# Patient Record
Sex: MALE | Race: OTHER | NOT HISPANIC OR LATINO
[De-identification: names, ages, dates, MRNs, and addresses within clinical notes are randomized per-mention and may not be internally consistent; named-entity substitution may affect disease eponyms.]

---

## 2018-11-15 PROBLEM — Z00.00 ENCOUNTER FOR PREVENTIVE HEALTH EXAMINATION: Status: ACTIVE | Noted: 2018-11-15

## 2018-11-23 ENCOUNTER — OTHER (OUTPATIENT)
Age: 78
End: 2018-11-23

## 2018-11-23 ENCOUNTER — TRANSCRIPTION ENCOUNTER (OUTPATIENT)
Age: 78
End: 2018-11-23

## 2018-11-23 ENCOUNTER — APPOINTMENT (OUTPATIENT)
Dept: RADIATION ONCOLOGY | Facility: CLINIC | Age: 78
End: 2018-11-23
Payer: MEDICARE

## 2018-11-23 VITALS
HEIGHT: 70 IN | OXYGEN SATURATION: 96 % | RESPIRATION RATE: 16 BRPM | BODY MASS INDEX: 26.2 KG/M2 | DIASTOLIC BLOOD PRESSURE: 92 MMHG | WEIGHT: 183 LBS | HEART RATE: 82 BPM | SYSTOLIC BLOOD PRESSURE: 151 MMHG

## 2018-11-23 DIAGNOSIS — Z78.9 OTHER SPECIFIED HEALTH STATUS: ICD-10-CM

## 2018-11-23 DIAGNOSIS — Z92.21 PERSONAL HISTORY OF ANTINEOPLASTIC CHEMOTHERAPY: ICD-10-CM

## 2018-11-23 DIAGNOSIS — Z86.69 PERSONAL HISTORY OF OTHER DISEASES OF THE NERVOUS SYSTEM AND SENSE ORGANS: ICD-10-CM

## 2018-11-23 DIAGNOSIS — F40.240 CLAUSTROPHOBIA: ICD-10-CM

## 2018-11-23 DIAGNOSIS — Z87.891 PERSONAL HISTORY OF NICOTINE DEPENDENCE: ICD-10-CM

## 2018-11-23 DIAGNOSIS — Z80.6 FAMILY HISTORY OF LEUKEMIA: ICD-10-CM

## 2018-11-23 PROCEDURE — 99205 OFFICE O/P NEW HI 60 MIN: CPT | Mod: 25

## 2018-11-23 PROCEDURE — 31575 DIAGNOSTIC LARYNGOSCOPY: CPT

## 2018-11-23 NOTE — DATA REVIEWED
[FreeTextEntry1] : I have personally reviewed all relevant imaging studies (PET, CT done at [facility]) and I have discussed the case with the referring physician Dr. Clifton.\par

## 2018-11-23 NOTE — HISTORY OF PRESENT ILLNESS
[FreeTextEntry1] : Mr. 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 Notably, he has PMH non-Hodgkin's lymphoma (patient unsure of which subtype), diagnosed in 1999, and treated by Dr. Shepherd with CHOP and Fludarabine.  \par \par He was followed by Dr. Clifton (Head & Neck) and had complaints of swelling and pain over the left submandibular gland in 2017. A CT scan of the neck done 12/12/17 showed no evidence of pharyngeal mass, asymmetry or laryngeal mass. There were numerous cervical lymph nodes which were larger on the left, as well as enlarged subclavian and axillary lymph nodes. \par \par He continued to have complaints of left neck pain and had occasional metallic taste in mouth in July 2018. He also developed a lisp/ difficulty speaking in July 2018.\par \par He underwent CT neck on 10/15/18, which showed the following:\par IMPRESSION:  \par 1. Soft tissue mass involving the left tongue base compatible with a tongue base neoplasm\par 2. Superficial nodule involving the right cheek which may represent an enlarged periparotid lymph node measuring 2.0 x 1.1 cm in axial cross-section, minimally increased in size since the prior study\par 3. Scattered mildly enlarged lymph nodes in the neck again noted, relatively stable since the prior study\par 4. Abnormal soft tissue within the orbits, not significantly changed\par 5. Stable nodular densities within the right upper lung\par 6. Cervical spondylosis with canal and foraminal stenosis at C4-C5. \par \par He underwent a biopsy of the mass on 10/26/18 by Dr. Clifton which showed invasive SCC  nonkeratinizing, poorly differentiated, p16 positive, HPV negative.  Flow cytometry also done, but results were inconclusive.\par \par PET/CT  ([facility])11/12/18\par IMPRESSION: \par 1.  Robust lobular hypermetabolic soft tissue mass involving the tongue base consistent with known primary tongue base malignancy.  It extends to the level of the hyoid without obvious involvement of the preepiglottic fat.  If warranted, consider dedicated MR imaging for further assessment.\par 2.  Assessment for metastatic regional lymph nodes from primary tongue malignancy is limited in this patient with evidence of low-grade lymphomatous involvement in the neck.  Asymmetrically hypermetabolic but stable appearing lymph nodes in the left level III lymph node would be the most concerning for potential regional ozzy disease from tongue base malignancy.\par 3.  Multiple homogeneous lymph nodes associated with low-grade uptake especially in the neck and chest and to a lesser extent the abdomen and pelvis is likely related to patient's history of non-Hodgkin's lymphoma.  Associated metabolic activity is consistent with a low-grade viability.\par 4.  No definite evidence to suggest focal splenic or marrow involvement of disease.  Low-grade diffuse involvement is not excluded.\par 5.  Stable appearance of peribronchial nodular foci in the right upper lobe, too small to characterize, but favoring a post inflammatory/infectious process.  Other benign type changes are seen in the mid to lower lung fields, which can be reassessed at follow-up imaging.\par 6.  No suspicious findings to suggest marginal or regional ozzy recurrence of prostate malignancy or evidence of osteoblastic metastatic disease.\par \par Today, he reports he feels generally well with exception of difficulty swallowing solid foods since the biopsy. He has been eating soups and soft foods. He also reports xerostomia and continues to have a lisp. He is mildly fatigue, but it improves with rest. He continues to work as a . He denies fever, chills, CP, SOB, N/V/D, decreased appetite. He has lost approx 6- 7 pounds since the biopsy on 10/26. Of note, he reports he is claustrophobic, has needed anxiolytics prior to imaging in the past.  \par \par His dentist is Dr. Hager ([phone]) and he last saw Dr. Hager in September. \par Family history notable for brother with leukemia. \par

## 2018-11-23 NOTE — PROCEDURE
[Lesion] : lesion identified by mirror examination needing further evaluation [Dysphagia] : dysphagia not clearly evaluated by indirect laryngoscopy [Topical Lidocaine] : topical lidocaine [Flexible Endoscope] : examined with the flexible endoscope [Photographs Taken] : photographs taken [Normal] : the true vocal cords ~T were normal [de-identified] : left BOT mass without extension to lingual surface of epiglottis. vallecula not well seen 2/2 saliva

## 2018-11-23 NOTE — PHYSICAL EXAM
[Normal] : normoactive bowel sounds, soft and nontender, no hepatosplenomegaly or masses appreciated [Sensation] : the sensory exam was normal to light touch and pinprick [Motor Exam] : the motor exam was normal [de-identified] : hot potato voice and mild slurred speech. tongue midline but poor movement to the left. mass felt in lateral left BOT. no other masses seen/palpated. good dentition  [de-identified] : enlarged and firm left submandibular gland [de-identified] : leftward tongue movement deficient; otherwise CN II-XII grossly intact.

## 2018-11-23 NOTE — REVIEW OF SYSTEMS
[Patient Intake Form Reviewed] : Patient intake form was reviewed [Recent Change In Weight] : ~T recent weight change [Dysphagia] : dysphagia [Negative] : Allergic/Immunologic [Fever] : no fever [Night Sweats] : no night sweats [Fatigue] : no fatigue [FreeTextEntry2] : weight loss of approx 6- 7 pounds in past 3 weeks [FreeTextEntry4] : see HPI.  difficulty swallowing solids. difficulty clearing saliva. Speech changes (lisp)

## 2018-11-23 NOTE — VITALS
[80: Normal activity with effort; some signs or symptoms of disease.] : 80: Normal activity with effort; some signs or symptoms of disease.  [Date: ____________] : Patient's last distress assessment performed on [unfilled]. [3 - Distress Level] : Distress Level: 3 [Maximal Pain Intensity: 0/10] : 0/10 [Least Pain Intensity: 0/10] : 0/10 [ECOG Performance Status: 1 - Restricted in physically strenuous activity but ambulatory and able to carry out work of a light or sedentary nature] : Performance Status: 1 - Restricted in physically strenuous activity but ambulatory and able to carry out work of a light or sedentary nature, e.g., light house work, office work [FreeTextEntry7] : Physical stressor- difficulty eating/ swallowing.

## 2018-11-26 ENCOUNTER — CLINICAL ADVICE (OUTPATIENT)
Age: 78
End: 2018-11-26

## 2018-11-28 ENCOUNTER — FORM ENCOUNTER (OUTPATIENT)
Age: 78
End: 2018-11-28

## 2018-11-28 ENCOUNTER — OUTPATIENT (OUTPATIENT)
Dept: OUTPATIENT SERVICES | Facility: HOSPITAL | Age: 78
LOS: 1 days | End: 2018-11-28
Payer: MEDICARE

## 2018-11-28 ENCOUNTER — RESULT REVIEW (OUTPATIENT)
Age: 78
End: 2018-11-28

## 2018-11-28 DIAGNOSIS — C01 MALIGNANT NEOPLASM OF BASE OF TONGUE: ICD-10-CM

## 2018-11-28 LAB — SURGICAL PATHOLOGY STUDY: SIGNIFICANT CHANGE UP

## 2018-11-28 PROCEDURE — 88321 CONSLTJ&REPRT SLD PREP ELSWR: CPT

## 2018-11-29 ENCOUNTER — APPOINTMENT (OUTPATIENT)
Dept: MRI IMAGING | Facility: HOSPITAL | Age: 78
End: 2018-11-29
Payer: MEDICARE

## 2018-11-29 ENCOUNTER — OUTPATIENT (OUTPATIENT)
Dept: OUTPATIENT SERVICES | Facility: HOSPITAL | Age: 78
LOS: 1 days | End: 2018-11-29
Payer: MEDICARE

## 2018-11-29 PROCEDURE — 70543 MRI ORBT/FAC/NCK W/O &W/DYE: CPT

## 2018-11-29 PROCEDURE — A9585: CPT

## 2018-11-29 PROCEDURE — 70543 MRI ORBT/FAC/NCK W/O &W/DYE: CPT | Mod: 26

## 2018-12-04 ENCOUNTER — APPOINTMENT (OUTPATIENT)
Dept: OTOLARYNGOLOGY | Facility: CLINIC | Age: 78
End: 2018-12-04
Payer: MEDICARE

## 2018-12-04 PROCEDURE — 92610 EVALUATE SWALLOWING FUNCTION: CPT | Mod: GN

## 2018-12-04 PROCEDURE — 92526 ORAL FUNCTION THERAPY: CPT | Mod: GN

## 2018-12-04 PROCEDURE — G8996: CPT | Mod: NC,CJ,GN

## 2018-12-04 PROCEDURE — G8997: CPT | Mod: NC,CI,GN

## 2018-12-18 VITALS
SYSTOLIC BLOOD PRESSURE: 140 MMHG | OXYGEN SATURATION: 99 % | BODY MASS INDEX: 25.02 KG/M2 | HEART RATE: 98 BPM | RESPIRATION RATE: 16 BRPM | DIASTOLIC BLOOD PRESSURE: 93 MMHG | WEIGHT: 174.4 LBS

## 2018-12-18 NOTE — PROCEDURE
[Lesion] : lesion identified by mirror examination needing further evaluation [Dysphagia] : dysphagia not clearly evaluated by indirect laryngoscopy [Topical Lidocaine] : topical lidocaine [Flexible Endoscope] : examined with the flexible endoscope [Photographs Taken] : photographs taken [Normal] : the true vocal cords ~T were normal [de-identified] : left BOT mass without extension to lingual surface of epiglottis. vallecula not well seen 2/2 saliva

## 2018-12-18 NOTE — REASON FOR VISIT
[Head and Neck Cancer] : head and neck cancer [Spouse] : spouse [Routine On-Treatment] : a routine on-treatment visit for

## 2018-12-18 NOTE — VITALS
[Maximal Pain Intensity: 0/10] : 0/10 [Least Pain Intensity: 0/10] : 0/10 [80: Normal activity with effort; some signs or symptoms of disease.] : 80: Normal activity with effort; some signs or symptoms of disease.  [ECOG Performance Status: 1 - Restricted in physically strenuous activity but ambulatory and able to carry out work of a light or sedentary nature] : Performance Status: 1 - Restricted in physically strenuous activity but ambulatory and able to carry out work of a light or sedentary nature, e.g., light house work, office work [Date: ____________] : Patient's last distress assessment performed on [unfilled]. [3 - Distress Level] : Distress Level: 3 [FreeTextEntry7] : Physical stressor- difficulty eating/ swallowing.

## 2018-12-18 NOTE — REVIEW OF SYSTEMS
[Patient Intake Form Reviewed] : Patient intake form was reviewed [FreeTextEntry2] : weight loss of approx 6- 7 pounds in past 3 weeks [FreeTextEntry4] : see HPI.  difficulty swallowing solids. difficulty clearing saliva. Speech changes (lisp) [Diarrhea: Grade 1 - Increase of <4 stools per day over baseline; mild increase in ostomy output compared to baseline] : Diarrhea: Grade 1 - Increase of <4 stools per day over baseline; mild increase in ostomy output compared to baseline [Dysphagia: Grade 1 - Symptomatic, able to eat regular diet] : Dysphagia: Grade 1 - Symptomatic, able to eat regular diet [Nausea: Grade 0] : Nausea: Grade 0 [Xerostomia: Grade 0] : Xerostomia: Grade 0 [Oral Pain: Grade 0] : Oral Pain: Grade 0 [Dysgeusia: Grade 0] : Dysgeusia: Grade 0 [Fatigue] : fatigue [Recent Change In Weight] : ~T recent weight change [Dysphagia] : dysphagia [Negative] : Integumentary [Fever] : no fever [Chills] : no chills [Night Sweats] : no night sweats

## 2018-12-18 NOTE — HISTORY OF PRESENT ILLNESS
[FreeTextEntry1] : 12/18/18-OTV-Mr. Nathaniel Fuentes has completed 0/7000cGy to oropharynx/neck\par Today he states that he feels well. He begins chemo under the care of Dr. Blandon on Wednesday. He had food poisoning and diarrhea all day yesterday and is just now able to eat again. \par His wife is interested in participating in [employer] chats. \par \par Oncologic History\par Mr. 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 Notably, he has PMH non-Hodgkin's lymphoma (patient unsure of which subtype), diagnosed in 1999, and treated by Dr. Shepherd with CHOP and Fludarabine.  \par \par He was followed by Dr. Clifton (Head & Neck) and had complaints of swelling and pain over the left submandibular gland in 2017. A CT scan of the neck done 12/12/17 showed no evidence of pharyngeal mass, asymmetry or laryngeal mass. There were numerous cervical lymph nodes which were larger on the left, as well as enlarged subclavian and axillary lymph nodes. \par \par He continued to have complaints of left neck pain and had occasional metallic taste in mouth in July 2018. He also developed a lisp/ difficulty speaking in July 2018.\par \par He underwent CT neck on 10/15/18, which showed the following:\par IMPRESSION:  \par 1. Soft tissue mass involving the left tongue base compatible with a tongue base neoplasm\par 2. Superficial nodule involving the right cheek which may represent an enlarged periparotid lymph node measuring 2.0 x 1.1 cm in axial cross-section, minimally increased in size since the prior study\par 3. Scattered mildly enlarged lymph nodes in the neck again noted, relatively stable since the prior study\par 4. Abnormal soft tissue within the orbits, not significantly changed\par 5. Stable nodular densities within the right upper lung\par 6. Cervical spondylosis with canal and foraminal stenosis at C4-C5. \par \par He underwent a biopsy of the mass on 10/26/18 by Dr. Clifton which showed invasive SCC  nonkeratinizing, poorly differentiated, p16 positive, HPV negative.  Flow cytometry also done, but results were inconclusive.\par \par PET/CT  ([facility])11/12/18\par IMPRESSION: \par 1.  Robust lobular hypermetabolic soft tissue mass involving the tongue base consistent with known primary tongue base malignancy.  It extends to the level of the hyoid without obvious involvement of the preepiglottic fat.  If warranted, consider dedicated MR imaging for further assessment.\par 2.  Assessment for metastatic regional lymph nodes from primary tongue malignancy is limited in this patient with evidence of low-grade lymphomatous involvement in the neck.  Asymmetrically hypermetabolic but stable appearing lymph nodes in the left level III lymph node would be the most concerning for potential regional ozzy disease from tongue base malignancy.\par 3.  Multiple homogeneous lymph nodes associated with low-grade uptake especially in the neck and chest and to a lesser extent the abdomen and pelvis is likely related to patient's history of non-Hodgkin's lymphoma.  Associated metabolic activity is consistent with a low-grade viability.\par 4.  No definite evidence to suggest focal splenic or marrow involvement of disease.  Low-grade diffuse involvement is not excluded.\par 5.  Stable appearance of peribronchial nodular foci in the right upper lobe, too small to characterize, but favoring a post inflammatory/infectious process.  Other benign type changes are seen in the mid to lower lung fields, which can be reassessed at follow-up imaging.\par 6.  No suspicious findings to suggest marginal or regional ozzy recurrence of prostate malignancy or evidence of osteoblastic metastatic disease.\par \par Today, he reports he feels generally well with exception of difficulty swallowing solid foods since the biopsy. He has been eating soups and soft foods. He also reports xerostomia and continues to have a lisp. He is mildly fatigue, but it improves with rest. He continues to work as a . He denies fever, chills, CP, SOB, N/V/D, decreased appetite. He has lost approx 6- 7 pounds since the biopsy on 10/26. Of note, he reports he is claustrophobic, has needed anxiolytics prior to imaging in the past.  \par \par His dentist is Dr. Hgaer ([phone]) and he last saw Dr. Hager in September. \par Family history notable for brother with leukemia. \par

## 2018-12-18 NOTE — DISEASE MANAGEMENT
[Clinical] : TNM Stage: c [III] : III [TTNM] : 4 [NTNM] : 0 [MTNM] : 0 [de-identified] : 0 [de-identified] : 0003 [de-identified] : oropharynx/neck

## 2018-12-18 NOTE — PHYSICAL EXAM
[Sensation] : the sensory exam was normal to light touch and pinprick [Motor Exam] : the motor exam was normal [de-identified] : enlarged and firm left submandibular gland [de-identified] : leftward tongue movement deficient; otherwise CN II-XII grossly intact.   [Normal Oral Cavity] : oral cavity was normal [] : no respiratory distress [Skin Color & Pigmentation] : normal skin color and pigmentation [Oriented To Time, Place, And Person] : oriented to person, place, and time [Normal] : supple with no thyromegaly or masses appreciated [de-identified] : tongue is limited on full protrusion 2/2 tethering at the left BOT/FOM, no masses seen or felt. enlarged and firm left submandibular gland.

## 2018-12-24 VITALS
WEIGHT: 174 LBS | DIASTOLIC BLOOD PRESSURE: 79 MMHG | HEART RATE: 99 BPM | BODY MASS INDEX: 24.97 KG/M2 | RESPIRATION RATE: 14 BRPM | OXYGEN SATURATION: 98 % | TEMPERATURE: 98.1 F | SYSTOLIC BLOOD PRESSURE: 128 MMHG

## 2018-12-24 VITALS — DIASTOLIC BLOOD PRESSURE: 80 MMHG | SYSTOLIC BLOOD PRESSURE: 122 MMHG | HEART RATE: 103 BPM

## 2018-12-24 NOTE — PHYSICAL EXAM
[Normal Oral Cavity] : oral cavity was normal [Normal] : supple with no thyromegaly or masses appreciated [] : no respiratory distress [Skin Color & Pigmentation] : normal skin color and pigmentation [Oriented To Time, Place, And Person] : oriented to person, place, and time [de-identified] : tongue is improved on protrusion, mild tethering at the left BOT/FOM, no masses seen or felt. enlarged and firm left submandibular gland.

## 2018-12-24 NOTE — REVIEW OF SYSTEMS
[Diarrhea: Grade 1 - Increase of <4 stools per day over baseline; mild increase in ostomy output compared to baseline] : Diarrhea: Grade 1 - Increase of <4 stools per day over baseline; mild increase in ostomy output compared to baseline [Dysphagia: Grade 1 - Symptomatic, able to eat regular diet] : Dysphagia: Grade 1 - Symptomatic, able to eat regular diet [Nausea: Grade 0] : Nausea: Grade 0 [Xerostomia: Grade 0] : Xerostomia: Grade 0 [Oral Pain: Grade 0] : Oral Pain: Grade 0 [Dysgeusia: Grade 0] : Dysgeusia: Grade 0 [Fatigue] : fatigue [Recent Change In Weight] : ~T recent weight change [Dysphagia] : dysphagia [Negative] : Integumentary [Edema Limbs: Grade 0] : Edema Limbs: Grade 0  [Fatigue: Grade 1 - Fatigue relieved by rest] : Fatigue: Grade 1 - Fatigue relieved by rest [Fever] : no fever [Chills] : no chills [Night Sweats] : no night sweats

## 2018-12-24 NOTE — DISEASE MANAGEMENT
[Clinical] : TNM Stage: c [III] : III [TTNM] : 4 [NTNM] : 0 [MTNM] : 0 [de-identified] : 568 [de-identified] : [id number] [de-identified] : oropharynx/neck

## 2018-12-24 NOTE — HISTORY OF PRESENT ILLNESS
[FreeTextEntry1] : 12/24/18-12/18/18-OTV-Mr. Nathaniel Fuentes has completed 800/7000cGy to oropharynx/neck\par Today he states he feels fatigued. He tolerated chemo well last Thurs, no n/v, no loss of appetite, and no constipation. He has not started aquaphor or gabapentin, he has brought all his meds in to the office so he can review with us. \par \par 12/18/18-OTV-Mr. Nathaniel Fuentes has completed 0/7000cGy to oropharynx/neck\par Today he states that he feels well. He begins chemo under the care of Dr. Blandon on Wednesday. He had food poisoning and diarrhea all day yesterday and is just now able to eat again. \par His wife is interested in participating in [employer]. \par \par Oncologic History\par Mr. 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 Notably, he has PMH non-Hodgkin's lymphoma (patient unsure of which subtype), diagnosed in 1999, and treated by Dr. Shepherd with CHOP and Fludarabine.  \par \par He was followed by Dr. Clifton (Head & Neck) and had complaints of swelling and pain over the left submandibular gland in 2017. A CT scan of the neck done 12/12/17 showed no evidence of pharyngeal mass, asymmetry or laryngeal mass. There were numerous cervical lymph nodes which were larger on the left, as well as enlarged subclavian and axillary lymph nodes. \par \par He continued to have complaints of left neck pain and had occasional metallic taste in mouth in July 2018. He also developed a lisp/ difficulty speaking in July 2018.\par \par He underwent CT neck on 10/15/18, which showed the following:\par IMPRESSION:  \par 1. Soft tissue mass involving the left tongue base compatible with a tongue base neoplasm\par 2. Superficial nodule involving the right cheek which may represent an enlarged periparotid lymph node measuring 2.0 x 1.1 cm in axial cross-section, minimally increased in size since the prior study\par 3. Scattered mildly enlarged lymph nodes in the neck again noted, relatively stable since the prior study\par 4. Abnormal soft tissue within the orbits, not significantly changed\par 5. Stable nodular densities within the right upper lung\par 6. Cervical spondylosis with canal and foraminal stenosis at C4-C5. \par \par He underwent a biopsy of the mass on 10/26/18 by Dr. Clifton which showed invasive SCC  nonkeratinizing, poorly differentiated, p16 positive, HPV negative.  Flow cytometry also done, but results were inconclusive.\par \par PET/CT  ([facility])11/12/18\par IMPRESSION: \par 1.  Robust lobular hypermetabolic soft tissue mass involving the tongue base consistent with known primary tongue base malignancy.  It extends to the level of the hyoid without obvious involvement of the preepiglottic fat.  If warranted, consider dedicated MR imaging for further assessment.\par 2.  Assessment for metastatic regional lymph nodes from primary tongue malignancy is limited in this patient with evidence of low-grade lymphomatous involvement in the neck.  Asymmetrically hypermetabolic but stable appearing lymph nodes in the left level III lymph node would be the most concerning for potential regional ozzy disease from tongue base malignancy.\par 3.  Multiple homogeneous lymph nodes associated with low-grade uptake especially in the neck and chest and to a lesser extent the abdomen and pelvis is likely related to patient's history of non-Hodgkin's lymphoma.  Associated metabolic activity is consistent with a low-grade viability.\par 4.  No definite evidence to suggest focal splenic or marrow involvement of disease.  Low-grade diffuse involvement is not excluded.\par 5.  Stable appearance of peribronchial nodular foci in the right upper lobe, too small to characterize, but favoring a post inflammatory/infectious process.  Other benign type changes are seen in the mid to lower lung fields, which can be reassessed at follow-up imaging.\par 6.  No suspicious findings to suggest marginal or regional ozzy recurrence of prostate malignancy or evidence of osteoblastic metastatic disease.\par \par Today, he reports he feels generally well with exception of difficulty swallowing solid foods since the biopsy. He has been eating soups and soft foods. He also reports xerostomia and continues to have a lisp. He is mildly fatigue, but it improves with rest. He continues to work as a . He denies fever, chills, CP, SOB, N/V/D, decreased appetite. He has lost approx 6- 7 pounds since the biopsy on 10/26. Of note, he reports he is claustrophobic, has needed anxiolytics prior to imaging in the past.  \par \par His dentist is Dr. Hager ([phone]) and he last saw Dr. Hager in September. \par Family history notable for brother with leukemia. \par

## 2018-12-31 VITALS
HEART RATE: 89 BPM | SYSTOLIC BLOOD PRESSURE: 133 MMHG | BODY MASS INDEX: 24.97 KG/M2 | RESPIRATION RATE: 15 BRPM | TEMPERATURE: 98.7 F | OXYGEN SATURATION: 97 % | WEIGHT: 174 LBS | DIASTOLIC BLOOD PRESSURE: 80 MMHG

## 2018-12-31 NOTE — DISEASE MANAGEMENT
[Clinical] : TNM Stage: c [III] : III [TTNM] : 4 [NTNM] : 0 [MTNM] : 0 [de-identified] : 9588 [de-identified] : 7000 cGy [de-identified] : oropharynx/neck

## 2018-12-31 NOTE — PHYSICAL EXAM
[Normal Oral Cavity] : oral cavity was normal [Normal] : supple with no thyromegaly or masses appreciated [Skin Color & Pigmentation] : normal skin color and pigmentation [Oriented To Time, Place, And Person] : oriented to person, place, and time [de-identified] : tongue is improved on protrusion, less tethering at the left BOT/FOM, no masses seen or felt. softer left submandibular gland.

## 2018-12-31 NOTE — HISTORY OF PRESENT ILLNESS
[FreeTextEntry1] : 12/31/18- OTV- Mr. Fuentes has completed 1600/7000 cGy to the oropharynx/ neck.  Today, he feels well. He felt tired on Saturday, after chemo on Thurs. No n/v, taste is intact, saliva is great. Tongue is moving well. He can eat soft foods. Constipation has been having the last 5 days. He is using gabapentin. Not using baking soda rinse. Using aquaphor. \par \par 12/24/18- OTV- Mr. Nathaniel Fuentes has completed 800/ 7000cGy to oropharynx/neck\par Today he states he feels fatigued. He tolerated chemo well last Thurs, no n/v, no loss of appetite, and no constipation. He has not started aquaphor or gabapentin, he has brought all his meds in to the office so he can review with us. \par \par 12/18/18-OTV-Mr. Nathaniel Fuentes has completed 0/7000cGy to oropharynx/neck\par Today he states that he feels well. He begins chemo under the care of Dr. Blandon on Wednesday. He had food poisoning and diarrhea all day yesterday and is just now able to eat again. \par His wife is interested in participating in [employer] ch[employer]. \par \par Oncologic History\par Mr. 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 Notably, he has PMH non-Hodgkin's lymphoma (patient unsure of which subtype), diagnosed in 1999, and treated by Dr. Shepherd with CHOP and Fludarabine.  \par \par He was followed by Dr. Clifton ([facility] Ne[facility]) and had complaints of swelling and pain over the left submandibular gland in 2017. A CT scan of the neck done 12/12/17 showed no evidence of pharyngeal mass, asymmetry or laryngeal mass. There were numerous cervical lymph nodes which were larger on the left, as well as enlarged subclavian and axillary lymph nodes. \par \par He continued to have complaints of left neck pain and had occasional metallic taste in mouth in July 2018. He also developed a lisp/ difficulty speaking in July 2018.\par \par He underwent CT neck on 10/15/18, which showed the following:\par IMPRESSION:  \par 1. Soft tissue mass involving the left tongue base compatible with a tongue base neoplasm\par 2. Superficial nodule involving the right cheek which may represent an enlarged periparotid lymph node measuring 2.0 x 1.1 cm in axial cross-section, minimally increased in size since the prior study\par 3. Scattered mildly enlarged lymph nodes in the neck again noted, relatively stable since the prior study\par 4. Abnormal soft tissue within the orbits, not significantly changed\par 5. Stable nodular densities within the right upper lung\par 6. Cervical spondylosis with canal and foraminal stenosis at C4-C5. \par \par He underwent a biopsy of the mass on 10/26/18 by Dr. Clifton which showed invasive SCC  nonkeratinizing, poorly differentiated, p16 positive, HPV negative.  Flow cytometry also done, but results were inconclusive.\par \par PET/CT  ([facility])11/12/18\par IMPRESSION: \par 1.  Robust lobular hypermetabolic soft tissue mass involving the tongue base consistent with known primary tongue base malignancy.  It extends to the level of the hyoid without obvious involvement of the preepiglottic fat.  If warranted, consider dedicated MR imaging for further assessment.\par 2.  Assessment for metastatic regional lymph nodes from primary tongue malignancy is limited in this patient with evidence of low-grade lymphomatous involvement in the neck.  Asymmetrically hypermetabolic but stable appearing lymph nodes in the left level III lymph node would be the most concerning for potential regional ozzy disease from tongue base malignancy.\par 3.  Multiple homogeneous lymph nodes associated with low-grade uptake especially in the neck and chest and to a lesser extent the abdomen and pelvis is likely related to patient's history of non-Hodgkin's lymphoma.  Associated metabolic activity is consistent with a low-grade viability.\par 4.  No definite evidence to suggest focal splenic or marrow involvement of disease.  Low-grade diffuse involvement is not excluded.\par 5.  Stable appearance of peribronchial nodular foci in the right upper lobe, too small to characterize, but favoring a post inflammatory/infectious process.  Other benign type changes are seen in the mid to lower lung fields, which can be reassessed at follow-up imaging.\par 6.  No suspicious findings to suggest marginal or regional ozzy recurrence of prostate malignancy or evidence of osteoblastic metastatic disease.\par \par Today, he reports he feels generally well with exception of difficulty swallowing solid foods since the biopsy. He has been eating soups and soft foods. He also reports xerostomia and continues to have a lisp. He is mildly fatigue, but it improves with rest. He continues to work as a . He denies fever, chills, CP, SOB, N/V/D, decreased appetite. He has lost approx 6- 7 pounds since the biopsy on 10/26. Of note, he reports he is claustrophobic, has needed anxiolytics prior to imaging in the past.  \par \par His dentist is Dr. Hager ([phone]) and he last saw Dr. Hager in September. \par Family history notable for brother with leukemia. \par

## 2018-12-31 NOTE — REVIEW OF SYSTEMS
[Diarrhea: Grade 1 - Increase of <4 stools per day over baseline; mild increase in ostomy output compared to baseline] : Diarrhea: Grade 1 - Increase of <4 stools per day over baseline; mild increase in ostomy output compared to baseline [Dysphagia: Grade 1 - Symptomatic, able to eat regular diet] : Dysphagia: Grade 1 - Symptomatic, able to eat regular diet [Nausea: Grade 0] : Nausea: Grade 0 [Edema Limbs: Grade 0] : Edema Limbs: Grade 0  [Fatigue: Grade 1 - Fatigue relieved by rest] : Fatigue: Grade 1 - Fatigue relieved by rest [Xerostomia: Grade 0] : Xerostomia: Grade 0 [Oral Pain: Grade 0] : Oral Pain: Grade 0 [Dysgeusia: Grade 0] : Dysgeusia: Grade 0 [Fatigue] : fatigue [Recent Change In Weight] : ~T recent weight change [Dysphagia] : dysphagia [Negative] : Integumentary [Fever] : no fever [Chills] : no chills [Night Sweats] : no night sweats

## 2019-01-10 VITALS
DIASTOLIC BLOOD PRESSURE: 71 MMHG | SYSTOLIC BLOOD PRESSURE: 113 MMHG | BODY MASS INDEX: 23.85 KG/M2 | WEIGHT: 166.2 LBS | RESPIRATION RATE: 15 BRPM | HEART RATE: 102 BPM

## 2019-01-10 NOTE — REVIEW OF SYSTEMS
[Diarrhea: Grade 1 - Increase of <4 stools per day over baseline; mild increase in ostomy output compared to baseline] : Diarrhea: Grade 1 - Increase of <4 stools per day over baseline; mild increase in ostomy output compared to baseline [Dysphagia: Grade 1 - Symptomatic, able to eat regular diet] : Dysphagia: Grade 1 - Symptomatic, able to eat regular diet [Nausea: Grade 0] : Nausea: Grade 0 [Fatigue] : fatigue [Recent Change In Weight] : ~T recent weight change [Dysphagia] : dysphagia [Negative] : Integumentary [Edema Limbs: Grade 0] : Edema Limbs: Grade 0  [Fatigue: Grade 2 - Fatigue not relieved by rest; limiting instrumental ADL] : Fatigue: Grade 2 - Fatigue not relieved by rest; limiting instrumental ADL [Mucositis Oral: Grade 1 - Asymptomatic or mild symptoms; intervention not indicated] : Mucositis Oral: Grade 1 - Asymptomatic or mild symptoms; intervention not indicated [Xerostomia: Grade 1 - Symptomatic (e.g., dry or thick saliva) without significant dietary alteration; unstimulated saliva flow >0.2 ml/min] : Xerostomia: Grade 1 - Symptomatic (e.g., dry or thick saliva) without significant dietary alteration; unstimulated saliva flow >0.2 ml/min [Oral Pain: Grade 1 - Mild pain] : Oral Pain: Grade 1 - Mild pain [Dysgeusia: Grade 1- Altered taste but no change in diet] : Dysgeusia: Grade 1 - Altered taste but no change in diet [Fever] : no fever [Chills] : no chills [Night Sweats] : no night sweats

## 2019-01-10 NOTE — DISEASE MANAGEMENT
[Clinical] : TNM Stage: c [FreeTextEntry4] : HPV related SCC  [TTNM] : 4 [NTNM] : 0 [MTNM] : 0 [III] : III [de-identified] : 3000 cGy [de-identified] : 7000 cGy [de-identified] : Oropharynx/ Neck

## 2019-01-10 NOTE — PHYSICAL EXAM
[Normal Oral Cavity] : oral cavity was normal [Normal] : supple with no thyromegaly or masses appreciated [Skin Color & Pigmentation] : normal skin color and pigmentation [Oriented To Time, Place, And Person] : oriented to person, place, and time [de-identified] : tongue less tethering at the left BOT/FOM, no masses. grade 1 mucositis. . softer left submandibular gland. [de-identified] : mild erythema chin

## 2019-01-10 NOTE — HISTORY OF PRESENT ILLNESS
[FreeTextEntry1] : 1/10/19- OTV- Mr. Fuentes has completed 3000/7000 cGy to the oropharynx/ neck.  Today, he complains of significant fatigue over weekends, after chemo. Denies n/v, pain. Has much less appetite, trying to eat protein shakes, but food is becoming harder to eat. Using gabapentin and rinsing mouth. \par \par Of note, he reported extreme fatigue on 1/8/19. Chemo/ labs scheduled for today; he wants to take a break before last cycle of chemo. This was discussed with Dr. Ratliff who is covering for Dr. Blandon and will discuss further with Dr. Blandon.\par \par 12/31/18- OTV- Mr. Fuentes has completed 1600/7000 cGy to the oropharynx/ neck.  Today, he feels well. He felt tired on Saturday, after chemo on Thurs. No n/v, taste is intact, saliva is great. Tongue is moving well. He can eat soft foods. Constipation has been having the last 5 days. He is using gabapentin. Not using baking soda rinse. Using aquaphor. \par \par 12/24/18- OTV- Mr. Nathaniel Fuentes has completed 800/ 7000cGy to oropharynx/neck\par Today he states he feels fatigued. He tolerated chemo well last Thurs, no n/v, no loss of appetite, and no constipation. He has not started aquaphor or gabapentin, he has brought all his meds in to the office so he can review with us. \par \par 12/18/18-OTV-Mr. Nathaniel Fuentes has completed 0/7000cGy to oropharynx/neck\par Today he states that he feels well. He begins chemo under the care of Dr. Blandon on Wednesday. He had food poisoning and diarrhea all day yesterday and is just now able to eat again. \par His wife is interested in participating in [employer] chats. \par \par Oncologic History\par Mr. 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 Notably, he has PMH non-Hodgkin's lymphoma (patient unsure of which subtype), diagnosed in 1999, and treated by Dr. Shepherd with CHOP and Fludarabine.  \par \par He was followed by Dr. Clifton ([facility]) and had complaints of swelling and pain over the left submandibular gland in 2017. A CT scan of the neck done 12/12/17 showed no evidence of pharyngeal mass, asymmetry or laryngeal mass. There were numerous cervical lymph nodes which were larger on the left, as well as enlarged subclavian and axillary lymph nodes. \par \par He continued to have complaints of left neck pain and had occasional metallic taste in mouth in July 2018. He also developed a lisp/ difficulty speaking in July 2018.\par \par He underwent CT neck on 10/15/18, which showed the following:\par IMPRESSION:  \par 1. Soft tissue mass involving the left tongue base compatible with a tongue base neoplasm\par 2. Superficial nodule involving the right cheek which may represent an enlarged periparotid lymph node measuring 2.0 x 1.1 cm in axial cross-section, minimally increased in size since the prior study\par 3. Scattered mildly enlarged lymph nodes in the neck again noted, relatively stable since the prior study\par 4. Abnormal soft tissue within the orbits, not significantly changed\par 5. Stable nodular densities within the right upper lung\par 6. Cervical spondylosis with canal and foraminal stenosis at C4-C5. \par \par He underwent a biopsy of the mass on 10/26/18 by Dr. Clifton which showed invasive SCC  nonkeratinizing, poorly differentiated, p16 positive, HPV negative.  Flow cytometry also done, but results were inconclusive.\par \par PET/CT  ([facility])11/12/18\par IMPRESSION: \par 1.  Robust lobular hypermetabolic soft tissue mass involving the tongue base consistent with known primary tongue base malignancy.  It extends to the level of the hyoid without obvious involvement of the preepiglottic fat.  If warranted, consider dedicated MR imaging for further assessment.\par 2.  Assessment for metastatic regional lymph nodes from primary tongue malignancy is limited in this patient with evidence of low-grade lymphomatous involvement in the neck.  Asymmetrically hypermetabolic but stable appearing lymph nodes in the left level III lymph node would be the most concerning for potential regional ozzy disease from tongue base malignancy.\par 3.  Multiple homogeneous lymph nodes associated with low-grade uptake especially in the neck and chest and to a lesser extent the abdomen and pelvis is likely related to patient's history of non-Hodgkin's lymphoma.  Associated metabolic activity is consistent with a low-grade viability.\par 4.  No definite evidence to suggest focal splenic or marrow involvement of disease.  Low-grade diffuse involvement is not excluded.\par 5.  Stable appearance of peribronchial nodular foci in the right upper lobe, too small to characterize, but favoring a post inflammatory/infectious process.  Other benign type changes are seen in the mid to lower lung fields, which can be reassessed at follow-up imaging.\par 6.  No suspicious findings to suggest marginal or regional ozzy recurrence of prostate malignancy or evidence of osteoblastic metastatic disease.\par \par Today, he reports he feels generally well with exception of difficulty swallowing solid foods since the biopsy. He has been eating soups and soft foods. He also reports xerostomia and continues to have a lisp. He is mildly fatigue, but it improves with rest. He continues to work as a . He denies fever, chills, CP, SOB, N/V/D, decreased appetite. He has lost approx 6- 7 pounds since the biopsy on 10/26. Of note, he reports he is claustrophobic, has needed anxiolytics prior to imaging in the past.  \par \par His dentist is Dr. Hager ([phone]) and he last saw Dr. Hager in September. \par Family history notable for brother with leukemia. \par

## 2019-01-15 VITALS
WEIGHT: 165.1 LBS | BODY MASS INDEX: 23.69 KG/M2 | HEART RATE: 87 BPM | RESPIRATION RATE: 16 BRPM | SYSTOLIC BLOOD PRESSURE: 126 MMHG | DIASTOLIC BLOOD PRESSURE: 73 MMHG

## 2019-01-15 VITALS — OXYGEN SATURATION: 96 %

## 2019-01-15 NOTE — PHYSICAL EXAM
[Normal Oral Cavity] : oral cavity was normal [Normal] : supple with no thyromegaly or masses appreciated [Skin Color & Pigmentation] : normal skin color and pigmentation [Oriented To Time, Place, And Person] : oriented to person, place, and time [de-identified] : tongue continues to have improved movement weekly at the left BOT/FOM, no masses, grade 1 mucositis visible in Opx.  softer left submandibular gland. [de-identified] : mild erythema chin, no breakdown noted.

## 2019-01-15 NOTE — REVIEW OF SYSTEMS
[Dysphagia: Grade 1 - Symptomatic, able to eat regular diet] : Dysphagia: Grade 1 - Symptomatic, able to eat regular diet [Nausea: Grade 0] : Nausea: Grade 0 [Edema Limbs: Grade 0] : Edema Limbs: Grade 0  [Fatigue: Grade 2 - Fatigue not relieved by rest; limiting instrumental ADL] : Fatigue: Grade 2 - Fatigue not relieved by rest; limiting instrumental ADL [Mucositis Oral: Grade 1 - Asymptomatic or mild symptoms; intervention not indicated] : Mucositis Oral: Grade 1 - Asymptomatic or mild symptoms; intervention not indicated [Xerostomia: Grade 1 - Symptomatic (e.g., dry or thick saliva) without significant dietary alteration; unstimulated saliva flow >0.2 ml/min] : Xerostomia: Grade 1 - Symptomatic (e.g., dry or thick saliva) without significant dietary alteration; unstimulated saliva flow >0.2 ml/min [Oral Pain: Grade 1 - Mild pain] : Oral Pain: Grade 1 - Mild pain [Dysgeusia: Grade 1- Altered taste but no change in diet] : Dysgeusia: Grade 1 - Altered taste but no change in diet [Fatigue] : fatigue [Recent Change In Weight] : ~T recent weight change [Dysphagia] : dysphagia [Negative] : Integumentary [Constipation: Grade 1 - Occasional or intermittent symptoms; occasional use of stool softeners, laxatives, dietary modification, or enema] : Constipation: Grade 1 - Occasional or intermittent symptoms; occasional use of stool softeners, laxatives, dietary modification, or enema [Diarrhea: Grade 0] : Diarrhea: Grade 0 [FreeTextEntry2] : Constipation resolved after taking Miralax [Fever] : no fever [Chills] : no chills [Night Sweats] : no night sweats

## 2019-01-15 NOTE — DISEASE MANAGEMENT
[Clinical] : TNM Stage: c [III] : III [FreeTextEntry4] : HPV related SCC  [TTNM] : 4 [NTNM] : 0 [MTNM] : 0 [de-identified] : 3600 cGy [de-identified] : 7000 cGy [de-identified] : Oropharynx/ Neck

## 2019-01-15 NOTE — HISTORY OF PRESENT ILLNESS
[FreeTextEntry1] : 1/15/19- OTV- Mr. Fuentes has completed 3600/7000 cGy to the oropharynx/ neck.  Today, he reports significant fatigue, which started after he received chemo on Friday. Feeling a bit better. Denies n/v, pain. Has decreased appetite, no taste sensation, trying to eat protein shakes, but food is becoming harder to eat. Lost one pound since last week. Using gabapentin 300 AM and 600 PM and rinsing mouth. He will be skipping chemo this week and resuming it next week.  As per phone call with wife yesterday, he is tired. She also stated he is constipated, no BM in at least a week. Wife bought Miralax and he took one dose, as well as prune juice. Patient reports today that he had 2 BMs yesterday. Using Aquaphor.\par \par 1/10/19- OTV- Mr. Fuentes has completed 3000/7000 cGy to the oropharynx/ neck.  Today, he complains of significant fatigue over weekends, after chemo. Denies n/v, pain. Has much less appetite, trying to eat protein shakes, but food is becoming harder to eat. Using gabapentin and rinsing mouth. \par \par Of note, he reported extreme fatigue on 1/8/19. Chemo/ labs scheduled for today; he wants to take a break before last cycle of chemo. This was discussed with Dr. Ratliff who is covering for Dr. Blandon and will discuss further with Dr. Blandon.\par \par 12/31/18- OTV- Mr. Fuentes has completed 1600/7000 cGy to the oropharynx/ neck.  Today, he feels well. He felt tired on Saturday, after chemo on Thurs. No n/v, taste is intact, saliva is great. Tongue is moving well. He can eat soft foods. Constipation has been having the last 5 days. He is using gabapentin. Not using baking soda rinse. Using aquaphor. \par \par 12/24/18- OTV- Mr. Nathaniel Fuentes has completed 800/ 7000cGy to oropharynx/neck\par Today he states he feels fatigued. He tolerated chemo well last Thurs, no n/v, no loss of appetite, and no constipation. He has not started aquaphor or gabapentin, he has brought all his meds in to the office so he can review with us. \par \par 12/18/18-OTV-Mr. Nathaniel Fuentes has completed 0/7000cGy to oropharynx/neck\par Today he states that he feels well. He begins chemo under the care of Dr. Blandon on Wednesday. He had food poisoning and diarrhea all day yesterday and is just now able to eat again. \par His wife is interested in participating in [employer] chats. \par \par Oncologic History\par Mr. 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 Notably, he has PMH non-Hodgkin's lymphoma (patient unsure of which subtype), diagnosed in 1999, and treated by Dr. Shepherd with CHOP and Fludarabine.  \par \par He was followed by Dr. Clifton (Head [facility] Neck) and had complaints of swelling and pain over the left submandibular gland in 2017. A CT scan of the neck done 12/12/17 showed no evidence of pharyngeal mass, asymmetry or laryngeal mass. There were numerous cervical lymph nodes which were larger on the left, as well as enlarged subclavian and axillary lymph nodes. \par \par He continued to have complaints of left neck pain and had occasional metallic taste in mouth in July 2018. He also developed a lisp/ difficulty speaking in July 2018.\par \par He underwent CT neck on 10/15/18, which showed the following:\par IMPRESSION:  \par 1. Soft tissue mass involving the left tongue base compatible with a tongue base neoplasm\par 2. Superficial nodule involving the right cheek which may represent an enlarged periparotid lymph node measuring 2.0 x 1.1 cm in axial cross-section, minimally increased in size since the prior study\par 3. Scattered mildly enlarged lymph nodes in the neck again noted, relatively stable since the prior study\par 4. Abnormal soft tissue within the orbits, not significantly changed\par 5. Stable nodular densities within the right upper lung\par 6. Cervical spondylosis with canal and foraminal stenosis at C4-C5. \par \par He underwent a biopsy of the mass on 10/26/18 by Dr. Clfiton which showed invasive SCC  nonkeratinizing, poorly differentiated, p16 positive, HPV negative.  Flow cytometry also done, but results were inconclusive.\par \par PET/CT  ([facility])11/12/18\par IMPRESSION: \par 1.  Robust lobular hypermetabolic soft tissue mass involving the tongue base consistent with known primary tongue base malignancy.  It extends to the level of the hyoid without obvious involvement of the preepiglottic fat.  If warranted, consider dedicated MR imaging for further assessment.\par 2.  Assessment for metastatic regional lymph nodes from primary tongue malignancy is limited in this patient with evidence of low-grade lymphomatous involvement in the neck.  Asymmetrically hypermetabolic but stable appearing lymph nodes in the left level III lymph node would be the most concerning for potential regional ozzy disease from tongue base malignancy.\par 3.  Multiple homogeneous lymph nodes associated with low-grade uptake especially in the neck and chest and to a lesser extent the abdomen and pelvis is likely related to patient's history of non-Hodgkin's lymphoma.  Associated metabolic activity is consistent with a low-grade viability.\par 4.  No definite evidence to suggest focal splenic or marrow involvement of disease.  Low-grade diffuse involvement is not excluded.\par 5.  Stable appearance of peribronchial nodular foci in the right upper lobe, too small to characterize, but favoring a post inflammatory/infectious process.  Other benign type changes are seen in the mid to lower lung fields, which can be reassessed at follow-up imaging.\par 6.  No suspicious findings to suggest marginal or regional ozzy recurrence of prostate malignancy or evidence of osteoblastic metastatic disease.\par \par Today, he reports he feels generally well with exception of difficulty swallowing solid foods since the biopsy. He has been eating soups and soft foods. He also reports xerostomia and continues to have a lisp. He is mildly fatigue, but it improves with rest. He continues to work as a . He denies fever, chills, CP, SOB, N/V/D, decreased appetite. He has lost approx 6- 7 pounds since the biopsy on 10/26. Of note, he reports he is claustrophobic, has needed anxiolytics prior to imaging in the past.  \par \par His dentist is Dr. Hager ([phone]) and he last saw Dr. Hager in September. \par Family history notable for brother with leukemia. \par

## 2019-01-22 VITALS
DIASTOLIC BLOOD PRESSURE: 84 MMHG | RESPIRATION RATE: 18 BRPM | OXYGEN SATURATION: 97 % | WEIGHT: 163.1 LBS | SYSTOLIC BLOOD PRESSURE: 121 MMHG | BODY MASS INDEX: 23.4 KG/M2 | HEART RATE: 91 BPM

## 2019-01-22 NOTE — REVIEW OF SYSTEMS
[Constipation: Grade 1 - Occasional or intermittent symptoms; occasional use of stool softeners, laxatives, dietary modification, or enema] : Constipation: Grade 1 - Occasional or intermittent symptoms; occasional use of stool softeners, laxatives, dietary modification, or enema [Diarrhea: Grade 0] : Diarrhea: Grade 0 [Dysphagia: Grade 1 - Symptomatic, able to eat regular diet] : Dysphagia: Grade 1 - Symptomatic, able to eat regular diet [Nausea: Grade 0] : Nausea: Grade 0 [Edema Limbs: Grade 0] : Edema Limbs: Grade 0  [Fatigue: Grade 2 - Fatigue not relieved by rest; limiting instrumental ADL] : Fatigue: Grade 2 - Fatigue not relieved by rest; limiting instrumental ADL [Mucositis Oral: Grade 1 - Asymptomatic or mild symptoms; intervention not indicated] : Mucositis Oral: Grade 1 - Asymptomatic or mild symptoms; intervention not indicated [Xerostomia: Grade 1 - Symptomatic (e.g., dry or thick saliva) without significant dietary alteration; unstimulated saliva flow >0.2 ml/min] : Xerostomia: Grade 1 - Symptomatic (e.g., dry or thick saliva) without significant dietary alteration; unstimulated saliva flow >0.2 ml/min [Oral Pain: Grade 1 - Mild pain] : Oral Pain: Grade 1 - Mild pain [Dysgeusia: Grade 1- Altered taste but no change in diet] : Dysgeusia: Grade 1 - Altered taste but no change in diet [Fatigue] : fatigue [Recent Change In Weight] : ~T recent weight change [Dysphagia] : dysphagia [Negative] : Integumentary [FreeTextEntry2] : Constipation resolved after taking Miralax [Fever] : no fever [Chills] : no chills [Night Sweats] : no night sweats

## 2019-01-22 NOTE — PHYSICAL EXAM
[Normal Oral Cavity] : oral cavity was normal [Normal] : supple with no thyromegaly or masses appreciated [Skin Color & Pigmentation] : normal skin color and pigmentation [Oriented To Time, Place, And Person] : oriented to person, place, and time [de-identified] : tongue continues to have improved movement weekly at the left BOT/FOM, no masses, grade 1-2 mucositis visible in Opx.  softer left submandibular gland. [de-identified] : grade 1 dermatitis  [de-identified] : mild erythema chin, no breakdown noted.

## 2019-01-22 NOTE — HISTORY OF PRESENT ILLNESS
[FreeTextEntry1] : 1/22/19- OTV- Mr. Fuentes has completed 4400/7000 cGy to the oropharynx/ neck. Main complaint this week is cuontinued poor taste which makes it very difficult to eat. Denies throat pain or other issues preventing PO intake. Gabapentin 300 AM, 300 PM is sufficient. Using aquaphor. Has chemo this Thursday \par \par 1/15/19- OTV- Mr. Fuentes has completed 3600/7000 cGy to the oropharynx/ neck.  Today, he reports significant fatigue, which started after he received chemo on Friday. Feeling a bit better. Denies n/v, pain. Has decreased appetite, no taste sensation, trying to eat protein shakes, but food is becoming harder to eat. Lost one pound since last week. Using gabapentin 300 AM and 600 PM and rinsing mouth. He will be skipping chemo this week and resuming it next week.  As per phone call with wife yesterday, he is tired. She also stated he is constipated, no BM in at least a week. Wife bought Miralax and he took one dose, as well as prune juice. Patient reports today that he had 2 BMs yesterday. Using Aquaphor.\par \par 1/10/19- OTV- Mr. Fuentes has completed 3000/7000 cGy to the oropharynx/ neck.  Today, he complains of significant fatigue over weekends, after chemo. Denies n/v, pain. Has much less appetite, trying to eat protein shakes, but food is becoming harder to eat. Using gabapentin and rinsing mouth. \par \par Of note, he reported extreme fatigue on 1/8/19. Chemo/ labs scheduled for today; he wants to take a break before last cycle of chemo. This was discussed with Dr. Ratliff who is covering for Dr. Blandon and will discuss further with Dr. Blandon.\par \par 12/31/18- OTV- Mr. Fuentes has completed 1600/7000 cGy to the oropharynx/ neck.  Today, he feels well. He felt tired on Saturday, after chemo on Thurs. No n/v, taste is intact, saliva is great. Tongue is moving well. He can eat soft foods. Constipation has been having the last 5 days. He is using gabapentin. Not using baking soda rinse. Using aquaphor. \par \par 12/24/18- OTV- Mr. Nathaniel Fuentes has completed 800/ 7000cGy to oropharynx/neck\par Today he states he feels fatigued. He tolerated chemo well last Thurs, no n/v, no loss of appetite, and no constipation. He has not started aquaphor or gabapentin, he has brought all his meds in to the office so he can review with us. \par \par 12/18/18-OTV-Mr. Nathaniel Fuentes has completed 0/7000cGy to oropharynx/neck\par Today he states that he feels well. He begins chemo under the care of Dr. Blandon on Wednesday. He had food poisoning and diarrhea all day yesterday and is just now able to eat again. \par His wife is interested in participating in [employer] chats. \par \par Oncologic History\par Mr. 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 Notably, he has PMH non-Hodgkin's lymphoma (patient unsure of which subtype), diagnosed in 1999, and treated by Dr. Shepherd with CHOP and Fludarabine.  \par \par He was followed by Dr. Clifton ([facility]) and had complaints of swelling and pain over the left submandibular gland in 2017. A CT scan of the neck done 12/12/17 showed no evidence of pharyngeal mass, asymmetry or laryngeal mass. There were numerous cervical lymph nodes which were larger on the left, as well as enlarged subclavian and axillary lymph nodes. \par \par He continued to have complaints of left neck pain and had occasional metallic taste in mouth in July 2018. He also developed a lisp/ difficulty speaking in July 2018.\par \par He underwent CT neck on 10/15/18, which showed the following:\par IMPRESSION:  \par 1. Soft tissue mass involving the left tongue base compatible with a tongue base neoplasm\par 2. Superficial nodule involving the right cheek which may represent an enlarged periparotid lymph node measuring 2.0 x 1.1 cm in axial cross-section, minimally increased in size since the prior study\par 3. Scattered mildly enlarged lymph nodes in the neck again noted, relatively stable since the prior study\par 4. Abnormal soft tissue within the orbits, not significantly changed\par 5. Stable nodular densities within the right upper lung\par 6. Cervical spondylosis with canal and foraminal stenosis at C4-C5. \par \par He underwent a biopsy of the mass on 10/26/18 by Dr. Clifton which showed invasive SCC  nonkeratinizing, poorly differentiated, p16 positive, HPV negative.  Flow cytometry also done, but results were inconclusive.\par \par PET/CT  ([facility])11/12/18\par IMPRESSION: \par 1.  Robust lobular hypermetabolic soft tissue mass involving the tongue base consistent with known primary tongue base malignancy.  It extends to the level of the hyoid without obvious involvement of the preepiglottic fat.  If warranted, consider dedicated MR imaging for further assessment.\par 2.  Assessment for metastatic regional lymph nodes from primary tongue malignancy is limited in this patient with evidence of low-grade lymphomatous involvement in the neck.  Asymmetrically hypermetabolic but stable appearing lymph nodes in the left level III lymph node would be the most concerning for potential regional ozzy disease from tongue base malignancy.\par 3.  Multiple homogeneous lymph nodes associated with low-grade uptake especially in the neck and chest and to a lesser extent the abdomen and pelvis is likely related to patient's history of non-Hodgkin's lymphoma.  Associated metabolic activity is consistent with a low-grade viability.\par 4.  No definite evidence to suggest focal splenic or marrow involvement of disease.  Low-grade diffuse involvement is not excluded.\par 5.  Stable appearance of peribronchial nodular foci in the right upper lobe, too small to characterize, but favoring a post inflammatory/infectious process.  Other benign type changes are seen in the mid to lower lung fields, which can be reassessed at follow-up imaging.\par 6.  No suspicious findings to suggest marginal or regional ozzy recurrence of prostate malignancy or evidence of osteoblastic metastatic disease.\par \par Today, he reports he feels generally well with exception of difficulty swallowing solid foods since the biopsy. He has been eating soups and soft foods. He also reports xerostomia and continues to have a lisp. He is mildly fatigue, but it improves with rest. He continues to work as a . He denies fever, chills, CP, SOB, N/V/D, decreased appetite. He has lost approx 6- 7 pounds since the biopsy on 10/26. Of note, he reports he is claustrophobic, has needed anxiolytics prior to imaging in the past.  \par \par His dentist is Dr. Hager ([phone]) and he last saw Dr. Hager in September. \par Family history notable for brother with leukemia. \par

## 2019-01-22 NOTE — DISEASE MANAGEMENT
[Clinical] : TNM Stage: c [III] : III [FreeTextEntry4] : HPV related SCC  [TTNM] : 4 [NTNM] : 0 [MTNM] : 0 [de-identified] : 4400 cGy [de-identified] : 7000 cGy [de-identified] : Oropharynx/ Neck

## 2019-01-28 ENCOUNTER — OTHER (OUTPATIENT)
Age: 79
End: 2019-01-28

## 2019-01-28 ENCOUNTER — RX RENEWAL (OUTPATIENT)
Age: 79
End: 2019-01-28

## 2019-01-28 VITALS
RESPIRATION RATE: 18 BRPM | OXYGEN SATURATION: 99 % | BODY MASS INDEX: 22.96 KG/M2 | WEIGHT: 160 LBS | SYSTOLIC BLOOD PRESSURE: 109 MMHG | DIASTOLIC BLOOD PRESSURE: 75 MMHG | HEART RATE: 89 BPM

## 2019-01-28 NOTE — REVIEW OF SYSTEMS
[Diarrhea: Grade 0] : Diarrhea: Grade 0 [Dysphagia: Grade 1 - Symptomatic, able to eat regular diet] : Dysphagia: Grade 1 - Symptomatic, able to eat regular diet [Nausea: Grade 0] : Nausea: Grade 0 [Edema Limbs: Grade 0] : Edema Limbs: Grade 0  [Fatigue: Grade 2 - Fatigue not relieved by rest; limiting instrumental ADL] : Fatigue: Grade 2 - Fatigue not relieved by rest; limiting instrumental ADL [Mucositis Oral: Grade 1 - Asymptomatic or mild symptoms; intervention not indicated] : Mucositis Oral: Grade 1 - Asymptomatic or mild symptoms; intervention not indicated [Xerostomia: Grade 1 - Symptomatic (e.g., dry or thick saliva) without significant dietary alteration; unstimulated saliva flow >0.2 ml/min] : Xerostomia: Grade 1 - Symptomatic (e.g., dry or thick saliva) without significant dietary alteration; unstimulated saliva flow >0.2 ml/min [Oral Pain: Grade 1 - Mild pain] : Oral Pain: Grade 1 - Mild pain [Dysgeusia: Grade 1- Altered taste but no change in diet] : Dysgeusia: Grade 1 - Altered taste but no change in diet [Fatigue] : fatigue [Recent Change In Weight] : ~T recent weight change [Dysphagia] : dysphagia [Negative] : Integumentary [Fever] : no fever [Chills] : no chills [Night Sweats] : no night sweats

## 2019-01-28 NOTE — PHYSICAL EXAM
[Normal Oral Cavity] : oral cavity was normal [Normal] : supple with no thyromegaly or masses appreciated [Skin Color & Pigmentation] : normal skin color and pigmentation [Oriented To Time, Place, And Person] : oriented to person, place, and time [de-identified] : thrush. tongue continues to have improved movement weekly at the left BOT/FOM, no masses, grade 1-2 mucositis visible in Opx.  softer left submandibular gland. [de-identified] : grade 1 dermatitis  [de-identified] : Grade 1 dermatitis to neck.

## 2019-01-28 NOTE — HISTORY OF PRESENT ILLNESS
[FreeTextEntry1] : 1/28/19 -OTV- Mr. Fuentes has completed 5200 cGy/ 7000 cGy to oropharynx/neck.  Today he reports no throat pain. He had chemo last Thursday, resulting in severe fatigue on Saturday and Sunday, which is improving. He has lost 3 pounds since last week, which he attributes to not eating as much this weekend as he spent much of his time sleeping. Continues to report poor taste,  which is stable. Continues on Gabapentin. He is using Aquaphor. He inquired about chemotherapy regimen, was told by infusion nurse that he has 6 total treatments, but he thought it was going to be 5. \par \par 1/22/19- OTV- Mr. Fuentes has completed 4400/7000 cGy to the oropharynx/ neck. Main complaint this week is cuontinued poor taste which makes it very difficult to eat. Denies throat pain or other issues preventing PO intake. Gabapentin 300 AM, 300 PM is sufficient. Using aquaphor. Has chemo this Thursday \par \par 1/15/19- OTV- Mr. Fuentes has completed 3600/7000 cGy to the oropharynx/ neck.  Today, he reports significant fatigue, which started after he received chemo on Friday. Feeling a bit better. Denies n/v, pain. Has decreased appetite, no taste sensation, trying to eat protein shakes, but food is becoming harder to eat. Lost one pound since last week. Using gabapentin 300 AM and 600 PM and rinsing mouth. He will be skipping chemo this week and resuming it next week.  As per phone call with wife yesterday, he is tired. She also stated he is constipated, no BM in at least a week. Wife bought Miralax and he took one dose, as well as prune juice. Patient reports today that he had 2 BMs yesterday. Using Aquaphor.\par \par 1/10/19- OTV- Mr. Fuentes has completed 3000/7000 cGy to the oropharynx/ neck.  Today, he complains of significant fatigue over weekends, after chemo. Denies n/v, pain. Has much less appetite, trying to eat protein shakes, but food is becoming harder to eat. Using gabapentin and rinsing mouth. \par \par Of note, he reported extreme fatigue on 1/8/19. Chemo/ labs scheduled for today; he wants to take a break before last cycle of chemo. This was discussed with Dr. Ratliff who is covering for Dr. Blandon and will discuss further with Dr. Blandon.\par \par 12/31/18- OTV- Mr. Fuentes has completed 1600/7000 cGy to the oropharynx/ neck.  Today, he feels well. He felt tired on Saturday, after chemo on Thurs. No n/v, taste is intact, saliva is great. Tongue is moving well. He can eat soft foods. Constipation has been having the last 5 days. He is using gabapentin. Not using baking soda rinse. Using aquaphor. \par \par 12/24/18- OTV- Mr. Nathaniel Fuentes has completed 800/ 7000cGy to oropharynx/neck\par Today he states he feels fatigued. He tolerated chemo well last Thurs, no n/v, no loss of appetite, and no constipation. He has not started aquaphor or gabapentin, he has brought all his meds in to the office so he can review with us. \par \par 12/18/18-OTV-Mr. Nathaniel Fuentes has completed 0/7000cGy to oropharynx/neck\par Today he states that he feels well. He begins chemo under the care of Dr. Blandon on Wednesday. He had food poisoning and diarrhea all day yesterday and is just now able to eat again. \par His wife is interested in participating in [employer] chats. \par \par Oncologic History\par Mr. 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 Notably, he has PMH non-Hodgkin's lymphoma (patient unsure of which subtype), diagnosed in 1999, and treated by Dr. Shepherd with CHOP and Fludarabine.  \par \par He was followed by Dr. Clifton ([facility]) and had complaints of swelling and pain over the left submandibular gland in 2017. A CT scan of the neck done 12/12/17 showed no evidence of pharyngeal mass, asymmetry or laryngeal mass. There were numerous cervical lymph nodes which were larger on the left, as well as enlarged subclavian and axillary lymph nodes. \par \par He continued to have complaints of left neck pain and had occasional metallic taste in mouth in July 2018. He also developed a lisp/ difficulty speaking in July 2018.\par \par He underwent CT neck on 10/15/18, which showed the following:\par IMPRESSION:  \par 1. Soft tissue mass involving the left tongue base compatible with a tongue base neoplasm\par 2. Superficial nodule involving the right cheek which may represent an enlarged periparotid lymph node measuring 2.0 x 1.1 cm in axial cross-section, minimally increased in size since the prior study\par 3. Scattered mildly enlarged lymph nodes in the neck again noted, relatively stable since the prior study\par 4. Abnormal soft tissue within the orbits, not significantly changed\par 5. Stable nodular densities within the right upper lung\par 6. Cervical spondylosis with canal and foraminal stenosis at C4-C5. \par \par He underwent a biopsy of the mass on 10/26/18 by Dr. Clifton which showed invasive SCC  nonkeratinizing, poorly differentiated, p16 positive, HPV negative.  Flow cytometry also done, but results were inconclusive.\par \par PET/CT  ([facility])11/12/18\par IMPRESSION: \par 1.  Robust lobular hypermetabolic soft tissue mass involving the tongue base consistent with known primary tongue base malignancy.  It extends to the level of the hyoid without obvious involvement of the preepiglottic fat.  If warranted, consider dedicated MR imaging for further assessment.\par 2.  Assessment for metastatic regional lymph nodes from primary tongue malignancy is limited in this patient with evidence of low-grade lymphomatous involvement in the neck.  Asymmetrically hypermetabolic but stable appearing lymph nodes in the left level III lymph node would be the most concerning for potential regional ozzy disease from tongue base malignancy.\par 3.  Multiple homogeneous lymph nodes associated with low-grade uptake especially in the neck and chest and to a lesser extent the abdomen and pelvis is likely related to patient's history of non-Hodgkin's lymphoma.  Associated metabolic activity is consistent with a low-grade viability.\par 4.  No definite evidence to suggest focal splenic or marrow involvement of disease.  Low-grade diffuse involvement is not excluded.\par 5.  Stable appearance of peribronchial nodular foci in the right upper lobe, too small to characterize, but favoring a post inflammatory/infectious process.  Other benign type changes are seen in the mid to lower lung fields, which can be reassessed at follow-up imaging.\par 6.  No suspicious findings to suggest marginal or regional ozzy recurrence of prostate malignancy or evidence of osteoblastic metastatic disease.\par \par Today, he reports he feels generally well with exception of difficulty swallowing solid foods since the biopsy. He has been eating soups and soft foods. He also reports xerostomia and continues to have a lisp. He is mildly fatigue, but it improves with rest. He continues to work as a . He denies fever, chills, CP, SOB, N/V/D, decreased appetite. He has lost approx 6- 7 pounds since the biopsy on 10/26. Of note, he reports he is claustrophobic, has needed anxiolytics prior to imaging in the past.  \par \par His dentist is Dr. Hager ([phone]) and he last saw Dr. Hager in September. \par Family history notable for brother with leukemia. \par

## 2019-01-28 NOTE — DISEASE MANAGEMENT
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

## 2019-02-05 VITALS
WEIGHT: 200.3 LBS | SYSTOLIC BLOOD PRESSURE: 135 MMHG | BODY MASS INDEX: 28.74 KG/M2 | DIASTOLIC BLOOD PRESSURE: 83 MMHG | RESPIRATION RATE: 18 BRPM | OXYGEN SATURATION: 94 % | HEART RATE: 61 BPM

## 2019-02-05 VITALS — BODY MASS INDEX: 22.21 KG/M2 | WEIGHT: 154.8 LBS

## 2019-02-05 NOTE — REVIEW OF SYSTEMS
[Diarrhea: Grade 0] : Diarrhea: Grade 0 [Dysphagia: Grade 1 - Symptomatic, able to eat regular diet] : Dysphagia: Grade 1 - Symptomatic, able to eat regular diet [Nausea: Grade 0] : Nausea: Grade 0 [Edema Limbs: Grade 0] : Edema Limbs: Grade 0  [Fatigue: Grade 2 - Fatigue not relieved by rest; limiting instrumental ADL] : Fatigue: Grade 2 - Fatigue not relieved by rest; limiting instrumental ADL [Mucositis Oral: Grade 1 - Asymptomatic or mild symptoms; intervention not indicated] : Mucositis Oral: Grade 1 - Asymptomatic or mild symptoms; intervention not indicated [Xerostomia: Grade 1 - Symptomatic (e.g., dry or thick saliva) without significant dietary alteration; unstimulated saliva flow >0.2 ml/min] : Xerostomia: Grade 1 - Symptomatic (e.g., dry or thick saliva) without significant dietary alteration; unstimulated saliva flow >0.2 ml/min [Oral Pain: Grade 1 - Mild pain] : Oral Pain: Grade 1 - Mild pain [Salivary duct inflammation: Grade 2 - Thick, ropy, sticky saliva; markedly altered taste; alteration in diet indicated; secretion-induced symptoms; limiting instrumental ADL] : Salivary duct inflammation: Grade 2 - Thick, ropy, sticky saliva; markedly altered taste; alteration in diet indicated; secretion-induced symptoms; limiting instrumental ADL [Dysgeusia: Grade 2 - Altered taste with change in diet (e.g., oral supplements); noxious or unpleasant taste; loss of taste] : Dysgeusia: Grade 2 - Altered taste with change in diet (e.g., oral supplements); noxious or unpleasant taste; loss of taste [Fever] : no fever [Chills] : no chills [Night Sweats] : no night sweats [Fatigue] : fatigue [Recent Change In Weight] : ~T recent weight change [Dysphagia] : dysphagia [Negative] : Integumentary

## 2019-02-05 NOTE — DISEASE MANAGEMENT
[Clinical] : TNM Stage: c [FreeTextEntry4] : HPV related SCC  [TTNM] : 4 [NTNM] : 0 [MTNM] : 0 [III] : III [de-identified] : 6400 cGy [de-identified] : 7000 cGy [de-identified] : Oropharynx/ Neck

## 2019-02-05 NOTE — PHYSICAL EXAM
[Normal Oral Cavity] : oral cavity was normal [Normal] : supple with no thyromegaly or masses appreciated [Skin Color & Pigmentation] : normal skin color and pigmentation [Oriented To Time, Place, And Person] : oriented to person, place, and time [de-identified] :  tongue continues to have improved movement weekly at the left BOT/FOM, no masses, grade 1-2 mucositis visible in Opx.  softer left submandibular gland. [de-identified] : grade 1 dermatitis  [de-identified] : Grade 1 dermatitis to neck.

## 2019-02-05 NOTE — HISTORY OF PRESENT ILLNESS
[FreeTextEntry1] : 2/5/19 - OTV- Mr. Fuentes has completed 5200 cGy/ 7000 cGy to oropharynx/neck. He had chemo last week, feels quite miserable today, no pain at all but has no taste and greatly diminished appetite. Also has copious phlegm and cough as a result. Very fatigued. \par \par 1/28/19 -OTV- Mr. Fuentes has completed 5200 cGy/ 7000 cGy to oropharynx/neck.  Today he reports no throat pain. He had chemo last Thursday, resulting in severe fatigue on Saturday and Sunday, which is improving. He has lost 3 pounds since last week, which he attributes to not eating as much this weekend as he spent much of his time sleeping. Continues to report poor taste,  which is stable. Continues on Gabapentin. He is using Aquaphor. He inquired about chemotherapy regimen, was told by infusion nurse that he has 6 total treatments, but he thought it was going to be 5. \par \par 1/22/19- OTV- Mr. Fuentes has completed 4400/7000 cGy to the oropharynx/ neck. Main complaint this week is cuontinued poor taste which makes it very difficult to eat. Denies throat pain or other issues preventing PO intake. Gabapentin 300 AM, 300 PM is sufficient. Using aquaphor. Has chemo this Thursday \par \par 1/15/19- OTV- Mr. Fuentes has completed 3600/7000 cGy to the oropharynx/ neck.  Today, he reports significant fatigue, which started after he received chemo on Friday. Feeling a bit better. Denies n/v, pain. Has decreased appetite, no taste sensation, trying to eat protein shakes, but food is becoming harder to eat. Lost one pound since last week. Using gabapentin 300 AM and 600 PM and rinsing mouth. He will be skipping chemo this week and resuming it next week.  As per phone call with wife yesterday, he is tired. She also stated he is constipated, no BM in at least a week. Wife bought Miralax and he took one dose, as well as prune juice. Patient reports today that he had 2 BMs yesterday. Using Aquaphor.\par \par 1/10/19- OTV- Mr. Fuentes has completed 3000/7000 cGy to the oropharynx/ neck.  Today, he complains of significant fatigue over weekends, after chemo. Denies n/v, pain. Has much less appetite, trying to eat protein shakes, but food is becoming harder to eat. Using gabapentin and rinsing mouth. \par \par Of note, he reported extreme fatigue on 1/8/19. Chemo/ labs scheduled for today; he wants to take a break before last cycle of chemo. This was discussed with Dr. Ratliff who is covering for Dr. Blandon and will discuss further with Dr. Blandon.\par \par 12/31/18- OTV- Mr. Fuentes has completed 1600/7000 cGy to the oropharynx/ neck.  Today, he feels well. He felt tired on Saturday, after chemo on Thurs. No n/v, taste is intact, saliva is great. Tongue is moving well. He can eat soft foods. Constipation has been having the last 5 days. He is using gabapentin. Not using baking soda rinse. Using aquaphor. \par \par 12/24/18- OTV- Mr. Nathaniel uFentes has completed 800/ 7000cGy to oropharynx/neck\par Today he states he feels fatigued. He tolerated chemo well last Thurs, no n/v, no loss of appetite, and no constipation. He has not started aquaphor or gabapentin, he has brought all his meds in to the office so he can review with us. \par \par 12/18/18-OTV-Mr. Nathaniel Fuentes has completed 0/7000cGy to oropharynx/neck\par Today he states that he feels well. He begins chemo under the care of Dr. Blandon on Wednesday. He had food poisoning and diarrhea all day yesterday and is just now able to eat again. \par His wife is interested in participating in [employer] ch[employer]. \par \par Oncologic History\par Mr. 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 Notably, he has PMH non-Hodgkin's lymphoma (patient unsure of which subtype), diagnosed in 1999, and treated by Dr. Shepherd with CHOP and Fludarabine.  \par \par He was followed by Dr. Clifton (Head & Neck) and had complaints of swelling and pain over the left submandibular gland in 2017. A CT scan of the neck done 12/12/17 showed no evidence of pharyngeal mass, asymmetry or laryngeal mass. There were numerous cervical lymph nodes which were larger on the left, as well as enlarged subclavian and axillary lymph nodes. \par \par He continued to have complaints of left neck pain and had occasional metallic taste in mouth in July 2018. He also developed a lisp/ difficulty speaking in July 2018.\par \par He underwent CT neck on 10/15/18, which showed the following:\par IMPRESSION:  \par 1. Soft tissue mass involving the left tongue base compatible with a tongue base neoplasm\par 2. Superficial nodule involving the right cheek which may represent an enlarged periparotid lymph node measuring 2.0 x 1.1 cm in axial cross-section, minimally increased in size since the prior study\par 3. Scattered mildly enlarged lymph nodes in the neck again noted, relatively stable since the prior study\par 4. Abnormal soft tissue within the orbits, not significantly changed\par 5. Stable nodular densities within the right upper lung\par 6. Cervical spondylosis with canal and foraminal stenosis at C4-C5. \par \par He underwent a biopsy of the mass on 10/26/18 by Dr. Clifton which showed invasive SCC  nonkeratinizing, poorly differentiated, p16 positive, HPV negative.  Flow cytometry also done, but results were inconclusive.\par \par PET/CT  ([facility] beds [facility])11/12/18\par IMPRESSION: \par 1.  Robust lobular hypermetabolic soft tissue mass involving the tongue base consistent with known primary tongue base malignancy.  It extends to the level of the hyoid without obvious involvement of the preepiglottic fat.  If warranted, consider dedicated MR imaging for further assessment.\par 2.  Assessment for metastatic regional lymph nodes from primary tongue malignancy is limited in this patient with evidence of low-grade lymphomatous involvement in the neck.  Asymmetrically hypermetabolic but stable appearing lymph nodes in the left level III lymph node would be the most concerning for potential regional ozzy disease from tongue base malignancy.\par 3.  Multiple homogeneous lymph nodes associated with low-grade uptake especially in the neck and chest and to a lesser extent the abdomen and pelvis is likely related to patient's history of non-Hodgkin's lymphoma.  Associated metabolic activity is consistent with a low-grade viability.\par 4.  No definite evidence to suggest focal splenic or marrow involvement of disease.  Low-grade diffuse involvement is not excluded.\par 5.  Stable appearance of peribronchial nodular foci in the right upper lobe, too small to characterize, but favoring a post inflammatory/infectious process.  Other benign type changes are seen in the mid to lower lung fields, which can be reassessed at follow-up imaging.\par 6.  No suspicious findings to suggest marginal or regional ozzy recurrence of prostate malignancy or evidence of osteoblastic metastatic disease.\par \par Today, he reports he feels generally well with exception of difficulty swallowing solid foods since the biopsy. He has been eating soups and soft foods. He also reports xerostomia and continues to have a lisp. He is mildly fatigue, but it improves with rest. He continues to work as a . He denies fever, chills, CP, SOB, N/V/D, decreased appetite. He has lost approx 6- 7 pounds since the biopsy on 10/26. Of note, he reports he is claustrophobic, has needed anxiolytics prior to imaging in the past.  \par \par His dentist is Dr. Hager ([phone]) and he last saw Dr. Hager in September. \par Family history notable for brother with leukemia. \par

## 2019-02-08 VITALS
WEIGHT: 151.8 LBS | SYSTOLIC BLOOD PRESSURE: 133 MMHG | HEART RATE: 110 BPM | BODY MASS INDEX: 21.78 KG/M2 | DIASTOLIC BLOOD PRESSURE: 84 MMHG | RESPIRATION RATE: 18 BRPM | OXYGEN SATURATION: 100 %

## 2019-02-26 ENCOUNTER — APPOINTMENT (OUTPATIENT)
Dept: RADIATION ONCOLOGY | Facility: CLINIC | Age: 79
End: 2019-02-26
Payer: MEDICARE

## 2019-02-26 ENCOUNTER — INPATIENT (INPATIENT)
Facility: HOSPITAL | Age: 79
LOS: 0 days | Discharge: HOME | DRG: 812 | End: 2019-02-27
Attending: STUDENT IN AN ORGANIZED HEALTH CARE EDUCATION/TRAINING PROGRAM | Admitting: STUDENT IN AN ORGANIZED HEALTH CARE EDUCATION/TRAINING PROGRAM
Payer: COMMERCIAL

## 2019-02-26 VITALS — SYSTOLIC BLOOD PRESSURE: 92 MMHG | DIASTOLIC BLOOD PRESSURE: 60 MMHG | HEART RATE: 106 BPM

## 2019-02-26 VITALS
BODY MASS INDEX: 21.09 KG/M2 | RESPIRATION RATE: 18 BRPM | HEART RATE: 102 BPM | OXYGEN SATURATION: 99 % | DIASTOLIC BLOOD PRESSURE: 64 MMHG | SYSTOLIC BLOOD PRESSURE: 98 MMHG | WEIGHT: 147 LBS

## 2019-02-26 VITALS
RESPIRATION RATE: 20 BRPM | OXYGEN SATURATION: 100 % | HEART RATE: 114 BPM | SYSTOLIC BLOOD PRESSURE: 121 MMHG | DIASTOLIC BLOOD PRESSURE: 76 MMHG | TEMPERATURE: 99 F | HEIGHT: 70 IN | WEIGHT: 141.98 LBS

## 2019-02-26 DIAGNOSIS — E87.1 HYPO-OSMOLALITY AND HYPONATREMIA: ICD-10-CM

## 2019-02-26 DIAGNOSIS — D64.9 ANEMIA, UNSPECIFIED: ICD-10-CM

## 2019-02-26 DIAGNOSIS — Z98.890 OTHER SPECIFIED POSTPROCEDURAL STATES: Chronic | ICD-10-CM
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
## 2019-02-26 DIAGNOSIS — Z29.9 ENCOUNTER FOR PROPHYLACTIC MEASURES, UNSPECIFIED: ICD-10-CM

## 2019-02-26 DIAGNOSIS — Z91.89 OTHER SPECIFIED PERSONAL RISK FACTORS, NOT ELSEWHERE CLASSIFIED: ICD-10-CM

## 2019-02-26 DIAGNOSIS — B37.0 CANDIDAL STOMATITIS: ICD-10-CM

## 2019-02-26 DIAGNOSIS — G25.81 RESTLESS LEGS SYNDROME: ICD-10-CM

## 2019-02-26 DIAGNOSIS — C85.90 NON-HODGKIN LYMPHOMA, UNSPECIFIED, UNSPECIFIED SITE: ICD-10-CM

## 2019-02-26 DIAGNOSIS — C02.9 MALIGNANT NEOPLASM OF TONGUE, UNSPECIFIED: ICD-10-CM

## 2019-02-26 DIAGNOSIS — Z90.49 ACQUIRED ABSENCE OF OTHER SPECIFIED PARTS OF DIGESTIVE TRACT: Chronic | ICD-10-CM

## 2019-02-26 LAB
ALBUMIN SERPL ELPH-MCNC: 2.8 G/DL — LOW (ref 3.3–5)
ALP SERPL-CCNC: 56 U/L — SIGNIFICANT CHANGE UP (ref 40–120)
ALT FLD-CCNC: 14 U/L — SIGNIFICANT CHANGE UP (ref 10–45)
ANION GAP SERPL CALC-SCNC: 11 MMOL/L — SIGNIFICANT CHANGE UP (ref 5–17)
ANION GAP SERPL CALC-SCNC: 11 MMOL/L — SIGNIFICANT CHANGE UP (ref 5–17)
ANISOCYTOSIS BLD QL: SIGNIFICANT CHANGE UP
APTT BLD: 27.1 SEC — LOW (ref 27.5–36.3)
AST SERPL-CCNC: 15 U/L — SIGNIFICANT CHANGE UP (ref 10–40)
BASOPHILS # BLD AUTO: 0 K/UL — SIGNIFICANT CHANGE UP (ref 0–0.2)
BASOPHILS NFR BLD AUTO: 0 % — SIGNIFICANT CHANGE UP (ref 0–2)
BILIRUB SERPL-MCNC: 0.2 MG/DL — SIGNIFICANT CHANGE UP (ref 0.2–1.2)
BLD GP AB SCN SERPL QL: NEGATIVE — SIGNIFICANT CHANGE UP
BUN SERPL-MCNC: 26 MG/DL — HIGH (ref 7–23)
BUN SERPL-MCNC: 29 MG/DL — HIGH (ref 7–23)
CALCIUM SERPL-MCNC: 8 MG/DL — LOW (ref 8.4–10.5)
CALCIUM SERPL-MCNC: 8 MG/DL — LOW (ref 8.4–10.5)
CHLORIDE SERPL-SCNC: 93 MMOL/L — LOW (ref 96–108)
CHLORIDE SERPL-SCNC: 94 MMOL/L — LOW (ref 96–108)
CO2 SERPL-SCNC: 26 MMOL/L — SIGNIFICANT CHANGE UP (ref 22–31)
CO2 SERPL-SCNC: 26 MMOL/L — SIGNIFICANT CHANGE UP (ref 22–31)
CREAT ?TM UR-MCNC: 48 MG/DL — SIGNIFICANT CHANGE UP
CREAT SERPL-MCNC: 0.71 MG/DL — SIGNIFICANT CHANGE UP (ref 0.5–1.3)
CREAT SERPL-MCNC: 0.79 MG/DL — SIGNIFICANT CHANGE UP (ref 0.5–1.3)
ELLIPTOCYTES BLD QL SMEAR: SLIGHT — SIGNIFICANT CHANGE UP
EOSINOPHIL # BLD AUTO: 0 K/UL — SIGNIFICANT CHANGE UP (ref 0–0.5)
EOSINOPHIL NFR BLD AUTO: 0 % — SIGNIFICANT CHANGE UP (ref 0–6)
GIANT PLATELETS BLD QL SMEAR: PRESENT — SIGNIFICANT CHANGE UP
GLUCOSE SERPL-MCNC: 113 MG/DL — HIGH (ref 70–99)
GLUCOSE SERPL-MCNC: 143 MG/DL — HIGH (ref 70–99)
HCT VFR BLD CALC: 15 % — CRITICAL LOW (ref 39–50)
HCT VFR BLD CALC: 19.6 % — CRITICAL LOW (ref 39–50)
HGB BLD-MCNC: 4.7 G/DL — CRITICAL LOW (ref 13–17)
HGB BLD-MCNC: 6.2 G/DL — CRITICAL LOW (ref 13–17)
LYMPHOCYTES # BLD AUTO: 0.55 K/UL — LOW (ref 1–3.3)
LYMPHOCYTES # BLD AUTO: 8.8 % — LOW (ref 13–44)
MACROCYTES BLD QL: SLIGHT — SIGNIFICANT CHANGE UP
MANUAL SMEAR VERIFICATION: SIGNIFICANT CHANGE UP
MCHC RBC-ENTMCNC: 28.3 PG — SIGNIFICANT CHANGE UP (ref 27–34)
MCHC RBC-ENTMCNC: 28.4 PG — SIGNIFICANT CHANGE UP (ref 27–34)
MCHC RBC-ENTMCNC: 31.3 GM/DL — LOW (ref 32–36)
MCHC RBC-ENTMCNC: 31.6 GM/DL — LOW (ref 32–36)
MCV RBC AUTO: 89.9 FL — SIGNIFICANT CHANGE UP (ref 80–100)
MCV RBC AUTO: 90.4 FL — SIGNIFICANT CHANGE UP (ref 80–100)
METAMYELOCYTES # FLD: 2.6 % — HIGH (ref 0–0)
MICROCYTES BLD QL: SLIGHT — SIGNIFICANT CHANGE UP
MONOCYTES # BLD AUTO: 0.16 K/UL — SIGNIFICANT CHANGE UP (ref 0–0.9)
MONOCYTES NFR BLD AUTO: 2.6 % — SIGNIFICANT CHANGE UP (ref 2–14)
NEUTROPHILS # BLD AUTO: 5.4 K/UL — SIGNIFICANT CHANGE UP (ref 1.8–7.4)
NEUTROPHILS NFR BLD AUTO: 78.1 % — HIGH (ref 43–77)
NEUTS BAND # BLD: 7.9 % — SIGNIFICANT CHANGE UP (ref 0–8)
NRBC # BLD: 0 /100 WBCS — SIGNIFICANT CHANGE UP (ref 0–0)
OSMOLALITY SERPL: 279 MOSM/KG — LOW (ref 280–301)
OSMOLALITY UR: 419 MOSMOL/KG — SIGNIFICANT CHANGE UP (ref 100–650)
OVALOCYTES BLD QL SMEAR: SLIGHT — SIGNIFICANT CHANGE UP
PLAT MORPH BLD: ABNORMAL
PLATELET # BLD AUTO: 221 K/UL — SIGNIFICANT CHANGE UP (ref 150–400)
PLATELET # BLD AUTO: 241 K/UL — SIGNIFICANT CHANGE UP (ref 150–400)
POIKILOCYTOSIS BLD QL AUTO: SLIGHT — SIGNIFICANT CHANGE UP
POLYCHROMASIA BLD QL SMEAR: SLIGHT — SIGNIFICANT CHANGE UP
POTASSIUM SERPL-MCNC: 3.8 MMOL/L — SIGNIFICANT CHANGE UP (ref 3.5–5.3)
POTASSIUM SERPL-MCNC: 3.9 MMOL/L — SIGNIFICANT CHANGE UP (ref 3.5–5.3)
POTASSIUM SERPL-SCNC: 3.8 MMOL/L — SIGNIFICANT CHANGE UP (ref 3.5–5.3)
POTASSIUM SERPL-SCNC: 3.9 MMOL/L — SIGNIFICANT CHANGE UP (ref 3.5–5.3)
PROT SERPL-MCNC: 6.4 G/DL — SIGNIFICANT CHANGE UP (ref 6–8.3)
RBC # BLD: 1.66 M/UL — LOW (ref 4.2–5.8)
RBC # BLD: 2.18 M/UL — LOW (ref 4.2–5.8)
RBC # FLD: 19.3 % — HIGH (ref 10.3–14.5)
RBC # FLD: 21.5 % — HIGH (ref 10.3–14.5)
RBC BLD AUTO: ABNORMAL
RH IG SCN BLD-IMP: POSITIVE — SIGNIFICANT CHANGE UP
RH IG SCN BLD-IMP: POSITIVE — SIGNIFICANT CHANGE UP
SODIUM SERPL-SCNC: 130 MMOL/L — LOW (ref 135–145)
SODIUM SERPL-SCNC: 131 MMOL/L — LOW (ref 135–145)
SODIUM UR-SCNC: 50 MMOL/L — SIGNIFICANT CHANGE UP
UUN UR-MCNC: 746 MG/DL — SIGNIFICANT CHANGE UP
WBC # BLD: 5.46 K/UL — SIGNIFICANT CHANGE UP (ref 3.8–10.5)
WBC # BLD: 6.28 K/UL — SIGNIFICANT CHANGE UP (ref 3.8–10.5)
WBC # FLD AUTO: 5.46 K/UL — SIGNIFICANT CHANGE UP (ref 3.8–10.5)
WBC # FLD AUTO: 6.28 K/UL — SIGNIFICANT CHANGE UP (ref 3.8–10.5)

## 2019-02-26 PROCEDURE — 93010 ELECTROCARDIOGRAM REPORT: CPT

## 2019-02-26 PROCEDURE — 99285 EMERGENCY DEPT VISIT HI MDM: CPT | Mod: 25

## 2019-02-26 PROCEDURE — 31575 DIAGNOSTIC LARYNGOSCOPY: CPT

## 2019-02-26 PROCEDURE — 99223 1ST HOSP IP/OBS HIGH 75: CPT | Mod: GC

## 2019-02-26 PROCEDURE — 99024 POSTOP FOLLOW-UP VISIT: CPT

## 2019-02-26 RX ORDER — LIDOCAINE 4 G/100G
10 CREAM TOPICAL ONCE
Qty: 0 | Refills: 0 | Status: DISCONTINUED | OUTPATIENT
Start: 2019-02-26 | End: 2019-02-26

## 2019-02-26 RX ORDER — LORAZEPAM 2 MG/1
2 TABLET ORAL
Qty: 5 | Refills: 0 | Status: COMPLETED | COMMUNITY
Start: 2018-11-23 | End: 2019-02-26

## 2019-02-26 RX ORDER — HEPARIN SODIUM 5000 [USP'U]/ML
5000 INJECTION INTRAVENOUS; SUBCUTANEOUS EVERY 8 HOURS
Qty: 0 | Refills: 0 | Status: DISCONTINUED | OUTPATIENT
Start: 2019-02-26 | End: 2019-02-27

## 2019-02-26 RX ORDER — TRAZODONE HCL 50 MG
25 TABLET ORAL
Qty: 0 | Refills: 0 | COMMUNITY

## 2019-02-26 RX ORDER — FLUCONAZOLE 150 MG/1
200 TABLET ORAL EVERY 24 HOURS
Qty: 0 | Refills: 0 | Status: DISCONTINUED | OUTPATIENT
Start: 2019-02-26 | End: 2019-02-27

## 2019-02-26 RX ORDER — SODIUM CHLORIDE 9 MG/ML
1000 INJECTION INTRAMUSCULAR; INTRAVENOUS; SUBCUTANEOUS
Qty: 0 | Refills: 0 | Status: DISCONTINUED | OUTPATIENT
Start: 2019-02-26 | End: 2019-02-27

## 2019-02-26 RX ORDER — TRAZODONE HCL 50 MG
25 TABLET ORAL AT BEDTIME
Qty: 0 | Refills: 0 | Status: DISCONTINUED | OUTPATIENT
Start: 2019-02-26 | End: 2019-02-27

## 2019-02-26 RX ORDER — LORAZEPAM 2 MG/1
2 TABLET ORAL
Qty: 1 | Refills: 0 | Status: COMPLETED | COMMUNITY
Start: 2018-11-09 | End: 2019-02-26

## 2019-02-26 RX ORDER — LIDOCAINE 4 G/100G
15 CREAM TOPICAL EVERY 8 HOURS
Qty: 0 | Refills: 0 | Status: DISCONTINUED | OUTPATIENT
Start: 2019-02-26 | End: 2019-02-27

## 2019-02-26 RX ORDER — LIDOCAINE 4 G/100G
5 CREAM TOPICAL
Qty: 0 | Refills: 0 | COMMUNITY

## 2019-02-26 RX ORDER — GABAPENTIN 300 MG/1
300 CAPSULE ORAL
Qty: 90 | Refills: 2 | Status: DISCONTINUED | COMMUNITY
Start: 2018-12-18 | End: 2019-02-26

## 2019-02-26 RX ORDER — HEPARIN SODIUM 5000 [USP'U]/ML
5000 INJECTION INTRAVENOUS; SUBCUTANEOUS EVERY 12 HOURS
Qty: 0 | Refills: 0 | Status: DISCONTINUED | OUTPATIENT
Start: 2019-02-26 | End: 2019-02-26

## 2019-02-26 RX ADMIN — SODIUM CHLORIDE 100 MILLILITER(S): 9 INJECTION INTRAMUSCULAR; INTRAVENOUS; SUBCUTANEOUS at 18:42

## 2019-02-26 RX ADMIN — FLUCONAZOLE 200 MILLIGRAM(S): 150 TABLET ORAL at 23:04

## 2019-02-26 NOTE — H&P ADULT - PMH
Non-Hodgkin's lymphoma, unspecified body region, unspecified non-Hodgkin lymphoma type    Squamous cell carcinoma of tongue

## 2019-02-26 NOTE — DISEASE MANAGEMENT
[Clinical] : TNM Stage: c [III] : III [FreeTextEntry4] : HPV related SCC  [TTNM] : 4 [NTNM] : 0 [MTNM] : 0 [de-identified] : Oropharynx/ Neck

## 2019-02-26 NOTE — H&P ADULT - PROBLEM SELECTOR PLAN 8
-DVT: HSQ  -GI: no indication -DVT: HSQ  -GI: no indication  -F: NS at 100cc/hr  -E: replete PRN  -N: soft diet with ensure bid -DVT: HSQ  -GI: no indication  -F: none  -E: replete PRN  -N: soft diet with ensure bid

## 2019-02-26 NOTE — ED PROVIDER NOTE - PHYSICAL EXAMINATION
Constitutional: Well appearing, well nourished, awake, alert, oriented to person, place, time/situation and in no apparent distress.  Head healing abrasion to L temporal region, C/D/I. no underlying hematoma, crepitus, or bony instability  ENMT: Airway patent. Dry MM. + thrush  Eyes: Clear bilaterally. + conjunctival pallor  Cardiac: [place] tachycardia, regular rhythm.  Heart sounds S1, S2.  No murmurs, rubs or gallops.  Respiratory: Breaths sounds equal and clear b/l. No increased WOB, tachypnea, hypoxia, or accessory mm use. Pt speaks in full sentences.   Gastrointestinal: Abd soft, NT, ND, NABS. No guarding, rebound, or rigidity. No pulsatile abdominal masses. No organomegaly appreciated. No CVAT   Musculoskeletal: Range of motion is not limited  Neuro: Alert and oriented x 3, face symmetric and speech fluent. Strength 5/5 x 4 ext and symmetric, nml gross motor movement, nml gait. No focal deficits noted.  Skin: Skin normal color for race, warm, dry and intact. No evidence of rash.  Psych: Alert and oriented to person, place, time/situation. normal mood and affect. no apparent risk to self or others.

## 2019-02-26 NOTE — REVIEW OF SYSTEMS
[Fatigue] : fatigue [Recent Change In Weight] : ~T recent weight change [Dysphagia] : dysphagia [Negative] : Integumentary [Diarrhea: Grade 0] : Diarrhea: Grade 0 [Dysphagia: Grade 1 - Symptomatic, able to eat regular diet] : Dysphagia: Grade 1 - Symptomatic, able to eat regular diet [Nausea: Grade 0] : Nausea: Grade 0 [Edema Limbs: Grade 0] : Edema Limbs: Grade 0  [Fatigue: Grade 2 - Fatigue not relieved by rest; limiting instrumental ADL] : Fatigue: Grade 2 - Fatigue not relieved by rest; limiting instrumental ADL [Mucositis Oral: Grade 1 - Asymptomatic or mild symptoms; intervention not indicated] : Mucositis Oral: Grade 1 - Asymptomatic or mild symptoms; intervention not indicated [Xerostomia: Grade 1 - Symptomatic (e.g., dry or thick saliva) without significant dietary alteration; unstimulated saliva flow >0.2 ml/min] : Xerostomia: Grade 1 - Symptomatic (e.g., dry or thick saliva) without significant dietary alteration; unstimulated saliva flow >0.2 ml/min [Oral Pain: Grade 1 - Mild pain] : Oral Pain: Grade 1 - Mild pain [Salivary duct inflammation: Grade 2 - Thick, ropy, sticky saliva; markedly altered taste; alteration in diet indicated; secretion-induced symptoms; limiting instrumental ADL] : Salivary duct inflammation: Grade 2 - Thick, ropy, sticky saliva; markedly altered taste; alteration in diet indicated; secretion-induced symptoms; limiting instrumental ADL [Dysgeusia: Grade 2 - Altered taste with change in diet (e.g., oral supplements); noxious or unpleasant taste; loss of taste] : Dysgeusia: Grade 2 - Altered taste with change in diet (e.g., oral supplements); noxious or unpleasant taste; loss of taste [Mucosal Pain] : mucosal pain [Vomiting: Grade 0] : Vomiting: Grade 0 [Fever] : no fever [Chills] : no chills [Night Sweats] : no night sweats

## 2019-02-26 NOTE — H&P ADULT - PROBLEM SELECTOR PLAN 3
-likely mechanical - slipped and fell  -EKG: sinus tachy, left axis deviation, RBBB  -fall precautions

## 2019-02-26 NOTE — H&P ADULT - PROBLEM SELECTOR PLAN 9
1) PCP Contacted on Admission: (Y/N) --> Name & Phone #:  2) Date of Contact with PCP:  3) PCP Contacted at Discharge: (Y/N)  4) Summary of Handoff Given to PCP:   5) Post-Discharge Appointment Date and Location: 1) PCP Contacted on Admission: (Y/N) --> No. Name & Phone #: Dr. Gustavo Mckeon, [phone].  2) Date of Contact with PCP:  3) PCP Contacted at Discharge: (Y/N)  4) Summary of Handoff Given to PCP:   5) Post-Discharge Appointment Date and Location:

## 2019-02-26 NOTE — H&P ADULT - ASSESSMENT
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 Shepherd with CHOP and Fludarabine, admitted for symptomatic anemia.

## 2019-02-26 NOTE — H&P ADULT - PROBLEM SELECTOR PLAN 7
-c/w Trazadone 25qd ([facility]) -will hold Trazadone 25qd (home med) given instructions from patient's oncologist

## 2019-02-26 NOTE — ED PROVIDER NOTE - CLINICAL SUMMARY MEDICAL DECISION MAKING FREE TEXT BOX
Pt p/w symptomatic anemia, likely 2/2 chemo, w/ concomitant dehydration, orthostasis, thrush. Transfuse 2 uPRBCs and maintenance fluids. Pt started on antifungals for thrush. Attempted to contact pt's oncologist w/o response. Will admit to hospitalist for further treatment

## 2019-02-26 NOTE — H&P ADULT - HISTORY OF PRESENT ILLNESS
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 Shepherd with CHOP and Fludarabine, presenting with weakness and lightheadedness for the past two weeks. Patient recently completed radiation therapy to the oropharynx/ neck from 12/19/18 to 2/8/19 with Dr. Souza. Subsequently, he started having generalized weakness and lightheadedness with position changes. Reports decreased PO intake and weight loss (4lbs over last 1-week) as well. On the night of 2/18/18, he fell in the middle of the night (did not hit his head as per patient's wife) due to lightheadedness. Last weekend, patient had another fall due to slipping on liquid on the floor. Pt and family attributed the fall to his restless less syndrome, called PCP Dr. Mckeon and went down on Trazodone dose. Pt went to see Dr. Souza on 2/26/19 and found to have Hb 4.7. Pt was subsequently taken to the ED. Off note, he received 5 out of 7 cycles of concurrent weekly Cisplatin with Dr. Blandon.  In the ED, vitals T98.8, BP-lying 125/71, BP-standing 98/64, R20, 100%RA. Received 1u pRBC and maintenance fluid NS 100cc/hr.     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 Shepherd with CHOP and Fludarabine, presenting with weakness and lightheadedness for the past two weeks. Patient recently completed radiation therapy to the oropharynx/ neck from 12/19/18 to 2/8/19 with Dr. Souza. Subsequently, he started having generalized weakness and lightheadedness with position changes. Reports decreased PO intake and weight loss (4lbs over last 1-week) as well. On the night of 2/18/18, he fell in the middle of the night (did not hit his head as per patient's wife) due to lightheadedness. Last weekend, patient had another fall due to slipping on liquid on the floor. Pt and family attributed the fall to his restless less syndrome, called PCP Dr. Mckeon and went down on Trazodone dose. Pt went to see Dr. Souza on 2/26/19 and found to have Hb 4.7. Pt was subsequently taken to the ED. Off note, he completed chemotherapy two weeks ago (had 6 cycles of weekly Cisplatin with Dr. Blandon).  In the ED, vitals T98.8, BP-lying 125/71, BP-standing 98/64, R20, 100%RA. Received 1u pRBC and maintenance fluid NS 100cc/hr.     ROS: Has mild oral/throat discomfort. No fever, chills, SOB, CP, palpitations, n/v or diarrhea.

## 2019-02-26 NOTE — ED ADULT NURSE NOTE - OBJECTIVE STATEMENT
Pt presents from oncology clinic with hgb 4.7. Pt reports generalized weakness and lightheadedness x few days. Denies any pain, CP, SOB, n/v/d, fever, cough, bloody stools/urine. Denies any blood thinner use. Pt has history of throat cancer, on chemo last 2 weeks ago.

## 2019-02-26 NOTE — PHYSICAL EXAM
[Normal Oral Cavity] : oral cavity was normal [Skin Color & Pigmentation] : normal skin color and pigmentation [Oriented To Time, Place, And Person] : oriented to person, place, and time [Normal] : no respiratory distress, lungs were clear to auscultation bilaterally [Abdomen Soft] : soft [Nondistended] : nondistended [Abdomen Tenderness] : non-tender [de-identified] :  tongue movement improved significantly left BOT/FOM, no masses, appropriate healing of confluent grade 1-2 mucositis visible in Opx.  softer left submandibular gland. [de-identified] : +hyperpigmentation, no breakdown noted. [de-identified] : hyperpigmentation noted.

## 2019-02-26 NOTE — REASON FOR VISIT
[Head and Neck Cancer] : head and neck cancer [Other: _____] : [unfilled] [Emergent Follow-Up] : an emergent follow-up visit for

## 2019-02-26 NOTE — HISTORY OF PRESENT ILLNESS
[FreeTextEntry1] : Mr. Nathaniel Fuentes has completed radiation therapy to the oropharynx/ neck for a total of 7000 cGy over 35 fractions from 12/19/18 to 2/8/19. He did not have any unexpected treatment complications during the course of treatment. He received 5 out of 7 cycles of concurrent weekly Cisplatin with Dr. Blandon. \par  \par 2/26/19- PTE\par Mr. Fuentes returns for an emergent PTE. Several phone calls took place over the past few weeks after he completed RT regarding poor PO intake. He was Rx'd MMW. He continued to lose weight since completion of treatment.  On the night of 2/18/18, he fell in the middle of the night (did not hit his head as per patient's wife) due to dizziness. He declined coming in last week for assessment/ labs; he and his wife reported improved PO intake. His wife Selma emailed over the weekend reporting another fall due to slipping on liquid on the floor. She stated he had increased his fluid intake and was doing quite well until the fall. They also called his PCP Dr. Mckeon to have the Trazodone prescription refilled as he had been having Restless Leg Syndrome most nights. Patient and his wife thought this was the cause of the second fall as he was quite stable in the day but became off balance in the night.   He last saw Dr. Blandon on 2/15/19 and has a follow up scheduled for this Friday. \par \par Today, he reports he is eating and drinking better. However, he feels generally weak and also has lightheadedness with position changes. He admits that he did hit his head with both falls, but denies headaches, LOC, confusion. He has lost four pounds since last seen on 2/8, but it is an improvement since last week (he has gained 4 pounds since last week). He is drinking 2 Ensure plus per day. Also eating soft/ pureed foods, soups, broth. He reports sore throat that is improving; was 9/ 10 last week, but now 5/ 10. Using MMW, which is helpful. Also reports xerostomia, using Biotene but only once a day. Patient and wife inquired about Trazodone; he is taking 100 mg daily for restless leg syndrome as per his PCP, but it makes him feel "disoriented." \par \par ______________________________________________\par ONCOLOGIC HISTORY \par Mr. 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 Notably, he has PMH non-Hodgkin's lymphoma (patient unsure of which subtype), diagnosed in 1999, and treated by Dr. Shepherd with CHOP and Fludarabine.  \par \par He was followed by Dr. Clifton (Head & Neck) and had complaints of swelling and pain over the left submandibular gland in 2017. A CT scan of the neck done 12/12/17 showed no evidence of pharyngeal mass, asymmetry or laryngeal mass. There were numerous cervical lymph nodes which were larger on the left, as well as enlarged subclavian and axillary lymph nodes. \par \par He continued to have complaints of left neck pain and had occasional metallic taste in mouth in July 2018. He also developed a lisp/ difficulty speaking in July 2018.\par \par He underwent CT neck on 10/15/18, which showed the following:\par IMPRESSION:  \par 1. Soft tissue mass involving the left tongue base compatible with a tongue base neoplasm\par 2. Superficial nodule involving the right cheek which may represent an enlarged periparotid lymph node measuring 2.0 x 1.1 cm in axial cross-section, minimally increased in size since the prior study\par 3. Scattered mildly enlarged lymph nodes in the neck again noted, relatively stable since the prior study\par 4. Abnormal soft tissue within the orbits, not significantly changed\par 5. Stable nodular densities within the right upper lung\par 6. Cervical spondylosis with canal and foraminal stenosis at C4-C5. \par \par He underwent a biopsy of the mass on 10/26/18 by Dr. Clifton which showed invasive SCC  nonkeratinizing, poorly differentiated, p16 positive, HPV negative.  Flow cytometry also done, but results were inconclusive.\par \par PET/CT  ([facility])11/12/18\par IMPRESSION: \par 1.  Robust lobular hypermetabolic soft tissue mass involving the tongue base consistent with known primary tongue base malignancy.  It extends to the level of the hyoid without obvious involvement of the preepiglottic fat.  If warranted, consider dedicated MR imaging for further assessment.\par 2.  Assessment for metastatic regional lymph nodes from primary tongue malignancy is limited in this patient with evidence of low-grade lymphomatous involvement in the neck.  Asymmetrically hypermetabolic but stable appearing lymph nodes in the left level III lymph node would be the most concerning for potential regional ozzy disease from tongue base malignancy.\par 3.  Multiple homogeneous lymph nodes associated with low-grade uptake especially in the neck and chest and to a lesser extent the abdomen and pelvis is likely related to patient's history of non-Hodgkin's lymphoma.  Associated metabolic activity is consistent with a low-grade viability.\par 4.  No definite evidence to suggest focal splenic or marrow involvement of disease.  Low-grade diffuse involvement is not excluded.\par 5.  Stable appearance of peribronchial nodular foci in the right upper lobe, too small to characterize, but favoring a post inflammatory/infectious process.  Other benign type changes are seen in the mid to lower lung fields, which can be reassessed at follow-up imaging.\par 6.  No suspicious findings to suggest marginal or regional ozzy recurrence of prostate malignancy or evidence of osteoblastic metastatic disease.\par \par Today, he reports he feels generally well with exception of difficulty swallowing solid foods since the biopsy. He has been eating soups and soft foods. He also reports xerostomia and continues to have a lisp. He is mildly fatigue, but it improves with rest. He continues to work as a . He denies fever, chills, CP, SOB, N/V/D, decreased appetite. He has lost approx 6- 7 pounds since the biopsy on 10/26. Of note, he reports he is claustrophobic, has needed anxiolytics prior to imaging in the past.  \par \par His dentist is Dr. Hager ([phone]) and he last saw Dr. Hager in September. \par Family history notable for brother with leukemia. \par

## 2019-02-26 NOTE — ED ADULT TRIAGE NOTE - CHIEF COMPLAINT QUOTE
Patient BIBA from oncology clinic for hgb level of 4.7 from blood work today , was sent here for evaluation . Pt. been c/o lightheadedness for few days . History of throat cancer had chemo last 2 weeks ago .

## 2019-02-26 NOTE — ED ADULT NURSE REASSESSMENT NOTE - NS ED NURSE REASSESS COMMENT FT1
Patient a/oX 3, no pain , SOB or dizziness/weakness complaint, states feeling better.  Vital signs stable.  PRBC 1st unit ongoing, no adverse rxn.  2nd unit to follow.  Additional labs sent.  ENdorsement and care recevied at this time by MICHAEL Rangel   ED holding pending 7 uris room being cleaned.  Patient transferred in stable condition.

## 2019-02-26 NOTE — PROCEDURE
[Dysphagia] : dysphagia not clearly evaluated by indirect laryngoscopy [Complicated Symptoms] : complicated symptoms requiring more thorough examination than provided by mirror [Topical Lidocaine] : topical lidocaine [Flexible Endoscope] : examined with the flexible endoscope [Photographs Taken] : photographs taken [Normal] : the true vocal cords ~T were normal [de-identified] : Mucositis at an appropriate stage of healing 2.5 weeks after CRT. [de-identified] : volume loss left BOT [de-identified] : Epiglottis scarred

## 2019-02-26 NOTE — H&P ADULT - NSHPLABSRESULTS_GEN_ALL_CORE
.  LABS:                         4.7    6.28  )-----------( 241      ( 26 Feb 2019 17:42 )             15.0     02-26    130<L>  |  93<L>  |  29<H>  ----------------------------<  113<H>  3.9   |  26  |  0.79    Ca    8.0<L>      26 Feb 2019 17:42    TPro  6.4  /  Alb  2.8<L>  /  TBili  0.2  /  DBili  x   /  AST  15  /  ALT  14  /  AlkPhos  56  02-26    PTT - ( 26 Feb 2019 17:42 )  PTT:27.1 sec              RADIOLOGY, EKG & ADDITIONAL TESTS: Reviewed. LABS:                         4.7    6.28  )-----------( 241      ( 26 Feb 2019 17:42 )             15.0     02-26    130<L>  |  93<L>  |  29<H>  ----------------------------<  113<H>  3.9   |  26  |  0.79    Ca    8.0<L>      26 Feb 2019 17:42    TPro  6.4  /  Alb  2.8<L>  /  TBili  0.2  /  DBili  x   /  AST  15  /  ALT  14  /  AlkPhos  56  02-26    PTT - ( 26 Feb 2019 17:42 )  PTT:27.1 sec              RADIOLOGY, EKG & ADDITIONAL TESTS: Reviewed.

## 2019-02-26 NOTE — ED PROVIDER NOTE - GASTROINTESTINAL NEGATIVE STATEMENT, MLM
no abdominal pain, no bloating, no constipation, no diarrhea, no nausea and no vomiting. no melena or hematochezia

## 2019-02-26 NOTE — H&P ADULT - PROBLEM SELECTOR PLAN 2
-hypotonic hypovolumic vs SIADH  -f/u urine studies -likely SIADH vs hypotonic hypovolemia. Pt appears dry on exam.   -f/u urine studies  -f/u TSH and AM cortisol  -f/u serial BMP  -c/w NS at 100cc/hr -likely SIADH vs hypotonic hypovolemia. Pt appears dry on exam.   -f/u urine studies  -f/u TSH and AM cortisol  -f/u serial BMP  -s/p gentle hydration at NS at 100cc/hr, will encourage PO intake

## 2019-02-26 NOTE — ED PROVIDER NOTE - CHIEF COMPLAINT
The patient is a 78y Male complaining of abnormal lab result. The patient is a 78y Male complaining of anemia.

## 2019-02-26 NOTE — H&P ADULT - NSHPPHYSICALEXAM_GEN_ALL_CORE
.  VITAL SIGNS:  T(C): 37.3 (02-26-19 @ 19:35), Max: 37.4 (02-26-19 @ 19:15)  T(F): 99.1 (02-26-19 @ 19:35), Max: 99.4 (02-26-19 @ 19:15)  HR: 105 (02-26-19 @ 19:35) (103 - 114)  BP: 134/71 (02-26-19 @ 19:35) (118/77 - 134/71)  BP(mean): --  RR: 18 (02-26-19 @ 19:35) (18 - 20)  SpO2: 98% (02-26-19 @ 19:35) (98% - 100%)  Wt(kg): --    PHYSICAL EXAM:    Constitutional: WDWN resting comfortably in bed; NAD  Head: NC/AT  Eyes: PERRL, EOMI, anicteric sclera  ENT: no nasal discharge; uvula midline, no oropharyngeal erythema or exudates; MMM  Neck: supple; no JVD or thyromegaly  Respiratory: CTA B/L; no W/R/R, no retractions  Cardiac: +S1/S2; RRR; no M/R/G; PMI non-displaced  Gastrointestinal: abdomen soft, NT/ND; no rebound or guarding; +BSx4  Genitourinary: normal external genitalia  Back: spine midline, no bony tenderness or step-offs; no CVAT B/L  Extremities: WWP, no clubbing or cyanosis; no peripheral edema  Musculoskeletal: NROM x4; no joint swelling, tenderness or erythema  Vascular: 2+ radial, femoral, DP/PT pulses B/L  Dermatologic: skin warm, dry and intact; no rashes, wounds, or scars  Lymphatic: no submandibular or cervical LAD  Neurologic: AAOx3; CNII-XII grossly intact; no focal deficits  Psychiatric: affect and characteristics of appearance, verbalizations, behaviors are appropriate VITAL SIGNS:  T(C): 37.3 (02-26-19 @ 19:35), Max: 37.4 (02-26-19 @ 19:15)  T(F): 99.1 (02-26-19 @ 19:35), Max: 99.4 (02-26-19 @ 19:15)  HR: 105 (02-26-19 @ 19:35) (103 - 114)  BP: 134/71 (02-26-19 @ 19:35) (118/77 - 134/71)  BP(mean): --  RR: 18 (02-26-19 @ 19:35) (18 - 20)  SpO2: 98% (02-26-19 @ 19:35) (98% - 100%)  Wt(kg): --    PHYSICAL EXAM:    Constitutional: WDWN resting comfortably in bed; NAD  HEENT: NC/AT, PERRL, EOMI, anicteric sclera, oral mucositis, bruising on left forehead  Neck: submandibular LAD  Respiratory: CTA B/L  Cardiac: +S1/S2; RRR  Gastrointestinal: abdomen soft, NT/ND; no rebound or guarding; +BSx4  Genitourinary: no wilcox  Extremities: no peripheral edema  Neurologic: AAOx3; CNII-XII grossly intact; no focal deficits  Psychiatric: affect and characteristics of appearance, verbalizations, behaviors are appropriate

## 2019-02-27 ENCOUNTER — TRANSCRIPTION ENCOUNTER (OUTPATIENT)
Age: 79
End: 2019-02-27

## 2019-02-27 VITALS
OXYGEN SATURATION: 95 % | SYSTOLIC BLOOD PRESSURE: 115 MMHG | RESPIRATION RATE: 18 BRPM | DIASTOLIC BLOOD PRESSURE: 66 MMHG | HEART RATE: 83 BPM | TEMPERATURE: 98 F

## 2019-02-27 LAB
ANION GAP SERPL CALC-SCNC: 9 MMOL/L — SIGNIFICANT CHANGE UP (ref 5–17)
BUN SERPL-MCNC: 19 MG/DL — SIGNIFICANT CHANGE UP (ref 7–23)
CALCIUM SERPL-MCNC: 8 MG/DL — LOW (ref 8.4–10.5)
CHLORIDE SERPL-SCNC: 98 MMOL/L — SIGNIFICANT CHANGE UP (ref 96–108)
CO2 SERPL-SCNC: 26 MMOL/L — SIGNIFICANT CHANGE UP (ref 22–31)
CORTIS AM PEAK SERPL-MCNC: 18.4 UG/DL — SIGNIFICANT CHANGE UP (ref 3.9–37.5)
CREAT SERPL-MCNC: 0.74 MG/DL — SIGNIFICANT CHANGE UP (ref 0.5–1.3)
GLUCOSE SERPL-MCNC: 107 MG/DL — HIGH (ref 70–99)
HCT VFR BLD CALC: 22.9 % — LOW (ref 39–50)
HCT VFR BLD CALC: 24.2 % — LOW (ref 39–50)
HGB BLD-MCNC: 7.5 G/DL — LOW (ref 13–17)
HGB BLD-MCNC: 7.7 G/DL — LOW (ref 13–17)
MAGNESIUM SERPL-MCNC: 1.4 MG/DL — LOW (ref 1.6–2.6)
MCHC RBC-ENTMCNC: 28.6 PG — SIGNIFICANT CHANGE UP (ref 27–34)
MCHC RBC-ENTMCNC: 29.1 PG — SIGNIFICANT CHANGE UP (ref 27–34)
MCHC RBC-ENTMCNC: 31.8 GM/DL — LOW (ref 32–36)
MCHC RBC-ENTMCNC: 32.8 GM/DL — SIGNIFICANT CHANGE UP (ref 32–36)
MCV RBC AUTO: 88.8 FL — SIGNIFICANT CHANGE UP (ref 80–100)
MCV RBC AUTO: 90 FL — SIGNIFICANT CHANGE UP (ref 80–100)
NRBC # BLD: 0 /100 WBCS — SIGNIFICANT CHANGE UP (ref 0–0)
NRBC # BLD: 0 /100 WBCS — SIGNIFICANT CHANGE UP (ref 0–0)
PLATELET # BLD AUTO: 232 K/UL — SIGNIFICANT CHANGE UP (ref 150–400)
PLATELET # BLD AUTO: 247 K/UL — SIGNIFICANT CHANGE UP (ref 150–400)
POTASSIUM SERPL-MCNC: 3.9 MMOL/L — SIGNIFICANT CHANGE UP (ref 3.5–5.3)
POTASSIUM SERPL-SCNC: 3.9 MMOL/L — SIGNIFICANT CHANGE UP (ref 3.5–5.3)
RBC # BLD: 2.58 M/UL — LOW (ref 4.2–5.8)
RBC # BLD: 2.69 M/UL — LOW (ref 4.2–5.8)
RBC # FLD: 18.5 % — HIGH (ref 10.3–14.5)
RBC # FLD: 19.2 % — HIGH (ref 10.3–14.5)
RETICS #: 155.4 K/UL — HIGH (ref 25–125)
RETICS/RBC NFR: 6.1 % — HIGH (ref 0.5–2.5)
SODIUM SERPL-SCNC: 133 MMOL/L — LOW (ref 135–145)
TSH SERPL-MCNC: 3.08 UIU/ML — SIGNIFICANT CHANGE UP (ref 0.35–4.94)
WBC # BLD: 5.53 K/UL — SIGNIFICANT CHANGE UP (ref 3.8–10.5)
WBC # BLD: 6.47 K/UL — SIGNIFICANT CHANGE UP (ref 3.8–10.5)
WBC # FLD AUTO: 5.53 K/UL — SIGNIFICANT CHANGE UP (ref 3.8–10.5)
WBC # FLD AUTO: 6.47 K/UL — SIGNIFICANT CHANGE UP (ref 3.8–10.5)

## 2019-02-27 PROCEDURE — 93005 ELECTROCARDIOGRAM TRACING: CPT

## 2019-02-27 PROCEDURE — 84466 ASSAY OF TRANSFERRIN: CPT

## 2019-02-27 PROCEDURE — 86900 BLOOD TYPING SEROLOGIC ABO: CPT

## 2019-02-27 PROCEDURE — 36415 COLL VENOUS BLD VENIPUNCTURE: CPT

## 2019-02-27 PROCEDURE — 99285 EMERGENCY DEPT VISIT HI MDM: CPT | Mod: 25

## 2019-02-27 PROCEDURE — 83735 ASSAY OF MAGNESIUM: CPT

## 2019-02-27 PROCEDURE — 85025 COMPLETE CBC W/AUTO DIFF WBC: CPT

## 2019-02-27 PROCEDURE — 86923 COMPATIBILITY TEST ELECTRIC: CPT

## 2019-02-27 PROCEDURE — 85045 AUTOMATED RETICULOCYTE COUNT: CPT

## 2019-02-27 PROCEDURE — 85730 THROMBOPLASTIN TIME PARTIAL: CPT

## 2019-02-27 PROCEDURE — 99238 HOSP IP/OBS DSCHRG MGMT 30/<: CPT

## 2019-02-27 PROCEDURE — 86850 RBC ANTIBODY SCREEN: CPT

## 2019-02-27 PROCEDURE — 84540 ASSAY OF URINE/UREA-N: CPT

## 2019-02-27 PROCEDURE — 82728 ASSAY OF FERRITIN: CPT

## 2019-02-27 PROCEDURE — 84300 ASSAY OF URINE SODIUM: CPT

## 2019-02-27 PROCEDURE — 80048 BASIC METABOLIC PNL TOTAL CA: CPT

## 2019-02-27 PROCEDURE — 83540 ASSAY OF IRON: CPT

## 2019-02-27 PROCEDURE — 80053 COMPREHEN METABOLIC PANEL: CPT

## 2019-02-27 PROCEDURE — 83930 ASSAY OF BLOOD OSMOLALITY: CPT

## 2019-02-27 PROCEDURE — 83935 ASSAY OF URINE OSMOLALITY: CPT

## 2019-02-27 PROCEDURE — 84443 ASSAY THYROID STIM HORMONE: CPT

## 2019-02-27 PROCEDURE — P9016: CPT

## 2019-02-27 PROCEDURE — 86901 BLOOD TYPING SEROLOGIC RH(D): CPT

## 2019-02-27 PROCEDURE — 85027 COMPLETE CBC AUTOMATED: CPT

## 2019-02-27 PROCEDURE — 36430 TRANSFUSION BLD/BLD COMPNT: CPT

## 2019-02-27 PROCEDURE — 83550 IRON BINDING TEST: CPT

## 2019-02-27 PROCEDURE — 82570 ASSAY OF URINE CREATININE: CPT

## 2019-02-27 PROCEDURE — 82533 TOTAL CORTISOL: CPT

## 2019-02-27 RX ORDER — LIDOCAINE 4 G/100G
15 CREAM TOPICAL EVERY 8 HOURS
Qty: 0 | Refills: 0 | Status: DISCONTINUED | OUTPATIENT
Start: 2019-02-27 | End: 2019-02-27

## 2019-02-27 RX ORDER — IRON SUCROSE 20 MG/ML
200 INJECTION, SOLUTION INTRAVENOUS ONCE
Qty: 0 | Refills: 0 | Status: COMPLETED | OUTPATIENT
Start: 2019-02-27 | End: 2019-02-27

## 2019-02-27 RX ORDER — FLUCONAZOLE 150 MG/1
1 TABLET ORAL
Qty: 6 | Refills: 0 | OUTPATIENT
Start: 2019-02-27 | End: 2019-03-04

## 2019-02-27 RX ORDER — DIPHENHYDRAMINE HYDROCHLORIDE AND LIDOCAINE HYDROCHLORIDE AND ALUMINUM HYDROXIDE AND MAGNESIUM HYDRO
10 KIT EVERY 12 HOURS
Qty: 0 | Refills: 0 | Status: DISCONTINUED | OUTPATIENT
Start: 2019-02-27 | End: 2019-02-27

## 2019-02-27 RX ORDER — MAGNESIUM SULFATE 500 MG/ML
4 VIAL (ML) INJECTION ONCE
Qty: 0 | Refills: 0 | Status: COMPLETED | OUTPATIENT
Start: 2019-02-27 | End: 2019-02-27

## 2019-02-27 RX ADMIN — HEPARIN SODIUM 5000 UNIT(S): 5000 INJECTION INTRAVENOUS; SUBCUTANEOUS at 05:44

## 2019-02-27 RX ADMIN — IRON SUCROSE 110 MILLIGRAM(S): 20 INJECTION, SOLUTION INTRAVENOUS at 09:49

## 2019-02-27 RX ADMIN — DIPHENHYDRAMINE HYDROCHLORIDE AND LIDOCAINE HYDROCHLORIDE AND ALUMINUM HYDROXIDE AND MAGNESIUM HYDRO 10 MILLILITER(S): KIT at 07:02

## 2019-02-27 RX ADMIN — Medication 100 GRAM(S): at 12:22

## 2019-02-27 NOTE — DISCHARGE NOTE PROVIDER - NSDCFUADDAPPT_GEN_ALL_CORE_FT
Please follow up with your primary care physician as soon as possible to discuss your anemia.  Please follow up with Dr. Blandon as soon as possible to resume treatment.

## 2019-02-27 NOTE — DISCHARGE NOTE NURSING/CASE MANAGEMENT/SOCIAL WORK - NSDCDPATPORTLINK_GEN_ALL_CORE
You can access the [facility], offered by [facility], [place], by registering with the following website: [email or web address]

## 2019-02-27 NOTE — DISCHARGE NOTE PROVIDER - NSDCCPCAREPLAN_GEN_ALL_CORE_FT
PRINCIPAL DISCHARGE DIAGNOSIS  Problem: Symptomatic anemia  Assessment and Plan of Treatment: You received 2 units of blood for your anemia with appropriate response.  You received IV iron as well.  Please follow up with your primary care physician to follow up as appropriate.      SECONDARY DISCHARGE DIAGNOSES  Problem: Thrush  Assessment and Plan of Treatment: You were given a medication called fluconazole for an oral infection called thrush.  This was sent to your pharmacy.  Please take 200 mg for six days.    Problem: Restless leg syndrome  Assessment and Plan of Treatment: Continue home meds    Problem: Squamous cell carcinoma of tongue  Assessment and Plan of Treatment: Please follow up with your oncologist to resume treatment as appropriate.

## 2019-02-27 NOTE — DISCHARGE NOTE PROVIDER - PROVIDER TOKENS
FREE:[LAST:[bhavya],FIRST:[chasidy],PHONE:[phone]],FAX:[(   )    -],FOLLOWUP:[1-3 days]],PROVIDER:[TOKEN:[[id number]],FOLLOWUP:[1 week]]

## 2019-02-27 NOTE — DISCHARGE NOTE PROVIDER - HOSPITAL COURSE
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 Shepherd with CHOP and Fludarabine, admitted for symptomatic anemia. Received 2 units pRBCs and CBC responded from 4.7 to 7.5.  Patient noted to have component of iron deficiency anemia.  Received IV iron as well.  No sources of bleed noted.    Noted to have some thrush and started on seven day course of fluconazole.

## 2019-03-04 NOTE — REASON FOR VISIT
[Post-Treatment Evaluation] : post-treatment evaluation for [Head and Neck Cancer] : head and neck cancer

## 2019-03-05 ENCOUNTER — APPOINTMENT (OUTPATIENT)
Dept: RADIATION ONCOLOGY | Facility: CLINIC | Age: 79
End: 2019-03-05
Payer: MEDICARE

## 2019-03-05 VITALS
DIASTOLIC BLOOD PRESSURE: 76 MMHG | WEIGHT: 144 LBS | SYSTOLIC BLOOD PRESSURE: 120 MMHG | RESPIRATION RATE: 18 BRPM | HEIGHT: 70 IN | TEMPERATURE: 98.3 F | HEART RATE: 133 BPM | OXYGEN SATURATION: 99 % | BODY MASS INDEX: 20.62 KG/M2

## 2019-03-05 VITALS — DIASTOLIC BLOOD PRESSURE: 70 MMHG | HEART RATE: 126 BPM | SYSTOLIC BLOOD PRESSURE: 103 MMHG

## 2019-03-05 DIAGNOSIS — Z86.19 PERSONAL HISTORY OF OTHER INFECTIOUS AND PARASITIC DISEASES: ICD-10-CM

## 2019-03-05 PROBLEM — C02.9 MALIGNANT NEOPLASM OF TONGUE, UNSPECIFIED: Chronic | Status: ACTIVE | Noted: 2019-02-26

## 2019-03-05 PROCEDURE — 99024 POSTOP FOLLOW-UP VISIT: CPT

## 2019-03-05 NOTE — HISTORY OF PRESENT ILLNESS
No
[FreeTextEntry1] : Mr. Nathaniel Fuentes has completed radiation therapy to the oropharynx/ neck for a total of 7000 cGy over 35 fractions from 12/19/18 to 2/8/19. He did not have any unexpected treatment complications during the course of treatment. He received 5 out of 7 cycles of concurrent weekly Cisplatin with Dr. Blandon. \par  \par Pt is here for a followup 3/5/19. Today he states he feels better, tolerates puree foods(warm cereal and soups), 2-3 Ensure shakes and whey protein recommended by Nutritionist. Increased PO fluids now to 2 Liters per day. Reports Painful throat while swallowing. Relieved by MMW and biotene. Denies any other pain. Visit with Dr Blandon today at 1pm, will review bloodwork after. ER Visit from Dr Blandon office on 2/26/19 for very low hemoglobin, received blood products, Iron and IVF. Says he is taking diflucan which will complete today (although we sent in 15 pills 1 week ago - wife will check # of pills at home). \par \par 2/26/19- PTE\par Mr. Fuentes returns for an emergent PTE. Several phone calls took place over the past few weeks after he completed RT regarding poor PO intake. He was Rx'd MMW. He continued to lose weight since completion of treatment. On the night of 2/18/18, he fell in the middle of the night (did not hit his head as per patient's wife) due to dizziness. He declined coming in last week for assessment/ labs; he and his wife reported improved PO intake. His wife Selma emailed over the weekend reporting another fall due to slipping on liquid on the floor. She stated he had increased his fluid intake and was doing quite well until the fall. They also called his PCP Dr. Mckeon to have the Trazodone prescription refilled as he had been having Restless Leg Syndrome most nights. Patient and his wife thought this was the cause of the second fall as he was quite stable in the day but became off balance in the night. He last saw Dr. Blandon on 2/15/19 and has a follow up scheduled for this Friday. \par \par Today, he reports he is eating and drinking better. However, he feels generally weak and also has lightheadedness with position changes. He admits that he did hit his head with both falls, but denies headaches, LOC, confusion. He has lost four pounds since last seen on 2/8, but it is an improvement since last week (he has gained 4 pounds since last week). He is drinking 2 Ensure plus per day. Also eating soft/ pureed foods, soups, broth. He reports sore throat that is improving; was 9/ 10 last week, but now 5/ 10. Using MMW, which is helpful. Also reports xerostomia, using Biotene but only once a day. Patient and wife inquired about Trazodone; he is taking 100 mg daily for restless leg syndrome as per his PCP, but it makes him feel "disoriented." \par \par ______________________________________________\par ONCOLOGIC HISTORY \par Mr. 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 Notably, he has PMH non-Hodgkin's lymphoma (patient unsure of which subtype), diagnosed in 1999, and treated by Dr. Shepherd with CHOP and Fludarabine. \par \par He was followed by Dr. Clifton (Head & Neck) and had complaints of swelling and pain over the left submandibular gland in 2017. A CT scan of the neck done 12/12/17 showed no evidence of pharyngeal mass, asymmetry or laryngeal mass. There were numerous cervical lymph nodes which were larger on the left, as well as enlarged subclavian and axillary lymph nodes. \par \par He continued to have complaints of left neck pain and had occasional metallic taste in mouth in July 2018. He also developed a lisp/ difficulty speaking in July 2018.\par \par He underwent CT neck on 10/15/18, which showed the following:\par IMPRESSION: \par 1. Soft tissue mass involving the left tongue base compatible with a tongue base neoplasm\par 2. Superficial nodule involving the right cheek which may represent an enlarged periparotid lymph node measuring 2.0 x 1.1 cm in axial cross-section, minimally increased in size since the prior study\par 3. Scattered mildly enlarged lymph nodes in the neck again noted, relatively stable since the prior study\par 4. Abnormal soft tissue within the orbits, not significantly changed\par 5. Stable nodular densities within the right upper lung\par 6. Cervical spondylosis with canal and foraminal stenosis at C4-C5. \par \par He underwent a biopsy of the mass on 10/26/18 by Dr. Clifton which showed invasive SCC nonkeratinizing, poorly differentiated, p16 positive, HPV negative. Flow cytometry also done, but results were inconclusive.\par \par PET/CT ([facility])11/12/18\par IMPRESSION: \par 1. Robust lobular hypermetabolic soft tissue mass involving the tongue base consistent with known primary tongue base malignancy. It extends to the level of the hyoid without obvious involvement of the preepiglottic fat. If warranted, consider dedicated MR imaging for further assessment.\par 2. Assessment for metastatic regional lymph nodes from primary tongue malignancy is limited in this patient with evidence of low-grade lymphomatous involvement in the neck. Asymmetrically hypermetabolic but stable appearing lymph nodes in the left level III lymph node would be the most concerning for potential regional ozzy disease from tongue base malignancy.\par 3. Multiple homogeneous lymph nodes associated with low-grade uptake especially in the neck and chest and to a lesser extent the abdomen and pelvis is likely related to patient's history of non-Hodgkin's lymphoma. Associated metabolic activity is consistent with a low-grade viability.\par 4. No definite evidence to suggest focal splenic or marrow involvement of disease. Low-grade diffuse involvement is not excluded.\par 5. Stable appearance of peribronchial nodular foci in the right upper lobe, too small to characterize, but favoring a post inflammatory/infectious process. Other benign type changes are seen in the mid to lower lung fields, which can be reassessed at follow-up imaging.\par 6. No suspicious findings to suggest marginal or regional ozzy recurrence of prostate malignancy or evidence of osteoblastic metastatic disease.\par \par Today, he reports he feels generally well with exception of difficulty swallowing solid foods since the biopsy. He has been eating soups and soft foods. He also reports xerostomia and continues to have a lisp. He is mildly fatigue, but it improves with rest. He continues to work as a . He denies fever, chills, CP, SOB, N/V/D, decreased appetite. He has lost approx 6- 7 pounds since the biopsy on 10/26. Of note, he reports he is claustrophobic, has needed anxiolytics prior to imaging in the past. \par \par His dentist is Dr. Hager ([phone]) and he last saw Dr. Hager in September. \par Family history notable for brother with leukemia. \par \par

## 2019-03-05 NOTE — DISEASE MANAGEMENT
[III] : III [Clinical] : TNM Stage: c [FreeTextEntry4] : oropharynx [TTNM] : 4 [NTNM] : 0 [MTNM] : 0

## 2019-03-05 NOTE — PHYSICAL EXAM
[Normal Oral Cavity] : oral cavity was normal [Normal] : supple with no thyromegaly or masses appreciated [Skin Color & Pigmentation] : normal skin color and pigmentation [Oriented To Time, Place, And Person] : oriented to person, place, and time [de-identified] :  tongue movement improved significantly left BOT/FOM, no masses, appropriate healing of confluent grade 1 mucositis visible in Opx. Left hemitongue atrophy and fasciculations. normal left submandibular gland. [de-identified] : grade 1 dermatitis resolving  [de-identified] : Grade 1 dermatitis to neck.

## 2019-03-05 NOTE — VITALS
[Least Pain Intensity: 0/10] : 0/10 [70: Cares for self; unalbe to carry on normal activity or do active work.] : 70: Cares for self; unable to carry on normal activity or do active work. [ECOG Performance Status: 2 - Ambulatory and capable of all self care but unable to carry out any work activities] : Performance Status: 2 - Ambulatory and capable of all self care but unable to carry out any work activities. Up and about more than 50% of waking hours

## 2019-03-06 DIAGNOSIS — D50.9 IRON DEFICIENCY ANEMIA, UNSPECIFIED: ICD-10-CM

## 2019-03-06 DIAGNOSIS — D63.0 ANEMIA IN NEOPLASTIC DISEASE: ICD-10-CM

## 2019-03-06 DIAGNOSIS — B37.0 CANDIDAL STOMATITIS: ICD-10-CM

## 2019-03-06 DIAGNOSIS — E86.1 HYPOVOLEMIA: ICD-10-CM

## 2019-03-06 DIAGNOSIS — C85.90 NON-HODGKIN LYMPHOMA, UNSPECIFIED, UNSPECIFIED SITE: ICD-10-CM

## 2019-03-06 DIAGNOSIS — E87.1 HYPO-OSMOLALITY AND HYPONATREMIA: ICD-10-CM

## 2019-03-06 DIAGNOSIS — G25.81 RESTLESS LEGS SYNDROME: ICD-10-CM

## 2019-03-06 DIAGNOSIS — D64.81 ANEMIA DUE TO ANTINEOPLASTIC CHEMOTHERAPY: ICD-10-CM

## 2019-03-06 DIAGNOSIS — Z82.3 FAMILY HISTORY OF STROKE: ICD-10-CM

## 2019-03-06 DIAGNOSIS — C01 MALIGNANT NEOPLASM OF BASE OF TONGUE: ICD-10-CM

## 2019-03-06 DIAGNOSIS — E86.0 DEHYDRATION: ICD-10-CM

## 2019-03-14 ENCOUNTER — RX RENEWAL (OUTPATIENT)
Age: 79
End: 2019-03-14

## 2019-03-18 ENCOUNTER — APPOINTMENT (OUTPATIENT)
Dept: RADIATION ONCOLOGY | Facility: CLINIC | Age: 79
End: 2019-03-18
Payer: MEDICARE

## 2019-03-19 ENCOUNTER — OTHER (OUTPATIENT)
Age: 79
End: 2019-03-19

## 2019-03-19 ENCOUNTER — APPOINTMENT (OUTPATIENT)
Dept: RADIATION ONCOLOGY | Facility: CLINIC | Age: 79
End: 2019-03-19
Payer: MEDICARE

## 2019-03-19 VITALS
DIASTOLIC BLOOD PRESSURE: 80 MMHG | RESPIRATION RATE: 18 BRPM | OXYGEN SATURATION: 99 % | SYSTOLIC BLOOD PRESSURE: 128 MMHG | WEIGHT: 143 LBS | TEMPERATURE: 98.6 F | HEART RATE: 127 BPM | HEIGHT: 70 IN | BODY MASS INDEX: 20.47 KG/M2

## 2019-03-19 PROCEDURE — 99024 POSTOP FOLLOW-UP VISIT: CPT

## 2019-03-19 NOTE — PHYSICAL EXAM
[Thin] : thin [de-identified] :  tongue movement improved significantly left BOT/FOM, no masses, mucositis almost totally improved. Left hemitongue atrophy. normal left submandibular gland. [de-identified] : grade 1 dermatitis resolving  [de-identified] : Grade 1 dermatitis to neck.

## 2019-03-19 NOTE — VITALS
[Maximal Pain Intensity: 8/10] : 8/10 [Pain Duration: ___] : Pain duration: [unfilled] [Pain Location: ___] : Pain Location: [unfilled] [NSAID/Non-Opioid] : NSAID/Non-Opioid

## 2019-03-19 NOTE — REVIEW OF SYSTEMS
[Recent Change In Weight] : ~T recent weight change [Odynophagia] : odynophagia [Constipation] : constipation [Constipation: Grade 1 - Occasional or intermittent symptoms; occasional use of stool softeners, laxatives, dietary modification, or enema] : Constipation: Grade 1 - Occasional or intermittent symptoms; occasional use of stool softeners, laxatives, dietary modification, or enema [Fatigue: Grade 1 - Fatigue relieved by rest] : Fatigue: Grade 1 - Fatigue relieved by rest [Mucositis Oral: Grade 1 - Asymptomatic or mild symptoms; intervention not indicated] : Mucositis Oral: Grade 1 - Asymptomatic or mild symptoms; intervention not indicated [Pharyngeal Mucositis: Grade 1 - Endoscopic findings only; minimal symptoms with normal oral intake; mild pain but analgesics not indicated] : Pharyngeal Mucositis: Grade 1 - Endoscopic findings only; minimal symptoms with normal oral intake; mild pain but analgesics not indicated [Dysphagia] : no dysphagia

## 2019-03-19 NOTE — HISTORY OF PRESENT ILLNESS
[FreeTextEntry1] : Mr. Nathaniel Fuentes has completed radiation therapy to the oropharynx/ neck for a total of 7000 cGy over 35 fractions from 12/19/18 to 2/8/19. He did not have any unexpected treatment complications during the course of treatment. He received 5 out of 7 cycles of concurrent weekly Cisplatin with Dr. Blandon. \par \par 3/19/19 - FOLLOW UP\par Today Using MMW, Biotene, he reports throat pain still persist, not using gabapentin regularly, encouraged to use as ordered to get relief. His wife says she is now forcing him to use gabapentin and to use MMW regularly. She also made sure he started the fluconazole RX. Tolerating only liquids, soft foods like yoghurt, potatoes mashed, cream of wheat. Assessed by visiting nurse, will start PT and Sp/sw therapy next week. Carries out physical activity as tolerated, walks 4 blocks daily. Constipation is relieved by miralax.\par \par 3/5/19- Pt is here for a followup 3/5/19. Today he states he feels better, tolerates puree foods(warm cereal and soups), 2-3 Ensure shakes and whey protein recommended by Nutritionist. Increased PO fluids now to 2 Liters per day. Reports Painful throat while swallowing. Relieved by MMW and biotene. Denies any other pain. Visit with Dr Blandon today at 1pm, will review bloodwork after. ER Visit from Dr Blandon office on 2/26/19 for very low hemoglobin, received blood products, Iron and IVF. Says he is taking diflucan which will complete today (although we sent in 15 pills 1 week ago - wife will check # of pills at home). \par \par 2/26/19- PTE\par Mr. Fuentes returns for an emergent PTE. Several phone calls took place over the past few weeks after he completed RT regarding poor PO intake. He was Rx'd MMW. He continued to lose weight since completion of treatment. On the night of 2/18/18, he fell in the middle of the night (did not hit his head as per patient's wife) due to dizziness. He declined coming in last week for assessment/ labs; he and his wife reported improved PO intake. His wife Selma emailed over the weekend reporting another fall due to slipping on liquid on the floor. She stated he had increased his fluid intake and was doing quite well until the fall. They also called his PCP Dr. Mckeon to have the Trazodone prescription refilled as he had been having Restless Leg Syndrome most nights. Patient and his wife thought this was the cause of the second fall as he was quite stable in the day but became off balance in the night. He last saw Dr. Blandon on 2/15/19 and has a follow up scheduled for this Friday. \par \par Today, he reports he is eating and drinking better. However, he feels generally weak and also has lightheadedness with position changes. He admits that he did hit his head with both falls, but denies headaches, LOC, confusion. He has lost four pounds since last seen on 2/8, but it is an improvement since last week (he has gained 4 pounds since last week). He is drinking 2 Ensure plus per day. Also eating soft/ pureed foods, soups, broth. He reports sore throat that is improving; was 9/ 10 last week, but now 5/ 10. Using MMW, which is helpful. Also reports xerostomia, using Biotene but only once a day. Patient and wife inquired about Trazodone; he is taking 100 mg daily for restless leg syndrome as per his PCP, but it makes him feel "disoriented." \par \par ______________________________________________\par ONCOLOGIC HISTORY \par Mr. 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 Notably, he has PMH non-Hodgkin's lymphoma (patient unsure of which subtype), diagnosed in 1999, and treated by Dr. Shepherd with CHOP and Fludarabine. \par \par He was followed by Dr. Clifton (Head [facility] Neck) and had complaints of swelling and pain over the left submandibular gland in 2017. A CT scan of the neck done 12/12/17 showed no evidence of pharyngeal mass, asymmetry or laryngeal mass. There were numerous cervical lymph nodes which were larger on the left, as well as enlarged subclavian and axillary lymph nodes. \par \par He continued to have complaints of left neck pain and had occasional metallic taste in mouth in July 2018. He also developed a lisp/ difficulty speaking in July 2018.\par \par He underwent CT neck on 10/15/18, which showed the following:\par IMPRESSION: \par 1. Soft tissue mass involving the left tongue base compatible with a tongue base neoplasm\par 2. Superficial nodule involving the right cheek which may represent an enlarged periparotid lymph node measuring 2.0 x 1.1 cm in axial cross-section, minimally increased in size since the prior study\par 3. Scattered mildly enlarged lymph nodes in the neck again noted, relatively stable since the prior study\par 4. Abnormal soft tissue within the orbits, not significantly changed\par 5. Stable nodular densities within the right upper lung\par 6. Cervical spondylosis with canal and foraminal stenosis at C4-C5. \par \par He underwent a biopsy of the mass on 10/26/[address] Clifton which showed invasive SCC nonkeratinizing, poorly differentiated, p16 positive, HPV negative. Flow cytometry also done, but results were inconclusive.\par \par PET/CT ([facility])11/12/18\par IMPRESSION: \par 1. Robust lobular hypermetabolic soft tissue mass involving the tongue base consistent with known primary tongue base malignancy. It extends to the level of the hyoid without obvious involvement of the preepiglottic fat. If warranted, consider dedicated MR imaging for further assessment.\par 2. Assessment for metastatic regional lymph nodes from primary tongue malignancy is limited in this patient with evidence of low-grade lymphomatous involvement in the neck. Asymmetrically hypermetabolic but stable appearing lymph nodes in the left level III lymph node would be the most concerning for potential regional ozzy disease from tongue base malignancy.\par 3. Multiple homogeneous lymph nodes associated with low-grade uptake especially in the neck and chest and to a lesser extent the abdomen and pelvis is likely related to patient's history of non-Hodgkin's lymphoma. Associated metabolic activity is consistent with a low-grade viability.\par 4. No definite evidence to suggest focal splenic or marrow involvement of disease. Low-grade diffuse involvement is not excluded.\par 5. Stable appearance of peribronchial nodular foci in the right upper lobe, too small to characterize, but favoring a post inflammatory/infectious process. Other benign type changes are seen in the mid to lower lung fields, which can be reassessed at follow-up imaging.\par 6. No suspicious findings to suggest marginal or regional ozzy recurrence of prostate malignancy or evidence of osteoblastic metastatic disease.\par \par Today, he reports he feels generally well with exception of difficulty swallowing solid foods since the biopsy. He has been eating soups and soft foods. He also reports xerostomia and continues to have a lisp. He is mildly fatigue, but it improves with rest. He continues to work as a . He denies fever, chills, CP, SOB, N/V/D, decreased appetite. He has lost approx 6- 7 pounds since the biopsy on 10/26. Of note, he reports he is claustrophobic, has needed anxiolytics prior to imaging in the past. \par \par His dentist is Dr. Hager ([phone]) and he last saw Dr. Hager in September. \par Family history notable for brother with leukemia. \par \par

## 2019-03-21 ENCOUNTER — TRANSCRIPTION ENCOUNTER (OUTPATIENT)
Age: 79
End: 2019-03-21

## 2019-03-28 ENCOUNTER — APPOINTMENT (OUTPATIENT)
Dept: OTOLARYNGOLOGY | Facility: CLINIC | Age: 79
End: 2019-03-28
Payer: COMMERCIAL

## 2019-03-28 PROCEDURE — 92610 EVALUATE SWALLOWING FUNCTION: CPT | Mod: GN

## 2019-03-28 PROCEDURE — 92526 ORAL FUNCTION THERAPY: CPT

## 2019-04-02 ENCOUNTER — APPOINTMENT (OUTPATIENT)
Dept: RADIATION ONCOLOGY | Facility: CLINIC | Age: 79
End: 2019-04-02
Payer: MEDICARE

## 2019-04-02 ENCOUNTER — OUTPATIENT (OUTPATIENT)
Dept: OUTPATIENT SERVICES | Facility: HOSPITAL | Age: 79
LOS: 1 days | End: 2019-04-02
Payer: MEDICARE

## 2019-04-02 VITALS
TEMPERATURE: 98 F | BODY MASS INDEX: 21.35 KG/M2 | HEART RATE: 72 BPM | SYSTOLIC BLOOD PRESSURE: 112 MMHG | RESPIRATION RATE: 16 BRPM | DIASTOLIC BLOOD PRESSURE: 69 MMHG | WEIGHT: 148.8 LBS | OXYGEN SATURATION: 96 %

## 2019-04-02 VITALS
DIASTOLIC BLOOD PRESSURE: 72 MMHG | HEIGHT: 70 IN | OXYGEN SATURATION: 97 % | RESPIRATION RATE: 18 BRPM | HEART RATE: 92 BPM | WEIGHT: 128.97 LBS | TEMPERATURE: 97 F | SYSTOLIC BLOOD PRESSURE: 114 MMHG

## 2019-04-02 DIAGNOSIS — Z90.49 ACQUIRED ABSENCE OF OTHER SPECIFIED PARTS OF DIGESTIVE TRACT: Chronic | ICD-10-CM

## 2019-04-02 DIAGNOSIS — Z98.890 OTHER SPECIFIED POSTPROCEDURAL STATES: Chronic | ICD-10-CM

## 2019-04-02 PROCEDURE — 86901 BLOOD TYPING SEROLOGIC RH(D): CPT

## 2019-04-02 PROCEDURE — 99024 POSTOP FOLLOW-UP VISIT: CPT

## 2019-04-02 PROCEDURE — P9016: CPT

## 2019-04-02 PROCEDURE — 86923 COMPATIBILITY TEST ELECTRIC: CPT

## 2019-04-02 PROCEDURE — 36415 COLL VENOUS BLD VENIPUNCTURE: CPT

## 2019-04-02 PROCEDURE — 36430 TRANSFUSION BLD/BLD COMPNT: CPT

## 2019-04-02 PROCEDURE — 86900 BLOOD TYPING SEROLOGIC ABO: CPT

## 2019-04-02 PROCEDURE — 86850 RBC ANTIBODY SCREEN: CPT

## 2019-04-02 RX ORDER — TRAZODONE HYDROCHLORIDE 100 MG/1
100 TABLET ORAL
Qty: 30 | Refills: 0 | Status: DISCONTINUED | COMMUNITY
Start: 2018-09-24 | End: 2019-04-02

## 2019-04-02 RX ORDER — FLUCONAZOLE 100 MG/1
100 TABLET ORAL
Qty: 15 | Refills: 0 | Status: DISCONTINUED | COMMUNITY
Start: 2019-01-28 | End: 2019-04-02

## 2019-04-02 RX ORDER — LIDOCAINE HYDROCHLORIDE 20 MG/ML
2 SOLUTION OROPHARYNGEAL
Qty: 600 | Refills: 2 | Status: DISCONTINUED | COMMUNITY
Start: 2018-12-18 | End: 2019-04-02

## 2019-04-02 NOTE — HISTORY OF PRESENT ILLNESS
[FreeTextEntry1] : Mr. Nathaniel Fuentes has completed radiation therapy to the oropharynx/ neck for a total of 7000 cGy over 35 fractions from 12/19/18 to 2/8/19. He did not have any unexpected treatment complications during the course of treatment. He received 5 out of 7 cycles of concurrent weekly Cisplatin with Dr. Blandon. \par \par 4/2/19- FOLLOW UP\par Mr. Fuentes presents for follow up. When he was last seen on 3/19/19, he was volume depleted and continuing to report throat pain. Plan at the time was for labs with Dr. BOBBY, complete course of Diflucan, increase Ensure plus to 3 cans/ day, continue MMW and Gabapentin, stop Trazodone, continue VNS services, and follow up with SLP. \par \par He saw SLP on 3/28/19, at which time he recommended H&N exercises to improve swallow strength. Complains of irritation in throat but no pain. Stopped trazadone and finished course of diflucan. Reports more energy to walk and exercise. Able to tolerate 3 cans of ensure a day. No longer using MMW, says its effects don’t last long. Able to tolerate liquids and soft foods. Taste beginning to come back. Constipation persists. Seeing Dr. BOBBY today for procrit. Sees him weekly per wife. He decided to cancel his trip to [place] this week. \par \par 3/19/19 - FOLLOW UP\par Today Using MMW, Biotene, he reports throat pain still persist, not using gabapentin regularly, encouraged to use as ordered to get relief. His wife says she is now forcing him to use gabapentin and to use MMW regularly. She also made sure he started the fluconazole RX. Tolerating only liquids, soft foods like yoghurt, potatoes mashed, cream of wheat. Assessed by visiting nurse, will start PT and Sp/sw therapy next week. Carries out physical activity as tolerated, walks 4 blocks daily. Constipation is relieved by miralax.\par \par 3/5/19- Pt is here for a followup 3/5/19. Today he states he feels better, tolerates puree foods(warm cereal and soups), 2-3 Ensure shakes and whey protein recommended by Nutritionist. Increased PO fluids now to 2 Liters per day. Reports Painful throat while swallowing. Relieved by MMW and biotene. Denies any other pain. Visit with Dr Blandon today at 1pm, will review bloodwork after. ER Visit from Dr Blandon office on 2/26/19 for very low hemoglobin, received blood products, Iron and IVF. Says he is taking diflucan which will complete today (although we sent in 15 pills 1 week ago - wife will check # of pills at home). \par \par 2/26/19- PTE\par Mr. Fuentes returns for an emergent PTE. Several phone calls took place over the past few weeks after he completed RT regarding poor PO intake. He was Rx'd MMW. He continued to lose weight since completion of treatment. On the night of 2/18/18, he fell in the middle of the night (did not hit his head as per patient's wife) due to dizziness. He declined coming in last week for assessment/ labs; he and his wife reported improved PO intake. His wife Selma emailed over the weekend reporting another fall due to slipping on liquid on the floor. She stated he had increased his fluid intake and was doing quite well until the fall. They also called his PCP Dr. Mckeon to have the Trazodone prescription refilled as he had been having Restless Leg Syndrome most nights. Patient and his wife thought this was the cause of the second fall as he was quite stable in the day but became off balance in the night. He last saw Dr. Blandon on 2/15/19 and has a follow up scheduled for this Friday. \par \par Today, he reports he is eating and drinking better. However, he feels generally weak and also has lightheadedness with position changes. He admits that he did hit his head with both falls, but denies headaches, LOC, confusion. He has lost four pounds since last seen on 2/8, but it is an improvement since last week (he has gained 4 pounds since last week). He is drinking 2 Ensure plus per day. Also eating soft/ pureed foods, soups, broth. He reports sore throat that is improving; was 9/ 10 last week, but now 5/ 10. Using MMW, which is helpful. Also reports xerostomia, using Biotene but only once a day. Patient and wife inquired about Trazodone; he is taking 100 mg daily for restless leg syndrome as per his PCP, but it makes him feel "disoriented." \par \par ______________________________________________\par ONCOLOGIC HISTORY \par Mr. 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 Notably, he has PMH non-Hodgkin's lymphoma (patient unsure of which subtype), diagnosed in 1999, and treated by Dr. Shepherd with CHOP and Fludarabine. \par \par He was followed by Dr. Clifton ([facility]) and had complaints of swelling and pain over the left submandibular gland in 2017. A CT scan of the neck done 12/12/17 showed no evidence of pharyngeal mass, asymmetry or laryngeal mass. There were numerous cervical lymph nodes which were larger on the left, as well as enlarged subclavian and axillary lymph nodes. \par \par He continued to have complaints of left neck pain and had occasional metallic taste in mouth in July 2018. He also developed a lisp/ difficulty speaking in July 2018.\par \par He underwent CT neck on 10/15/18, which showed the following:\par IMPRESSION: \par 1. Soft tissue mass involving the left tongue base compatible with a tongue base neoplasm\par 2. Superficial nodule involving the right cheek which may represent an enlarged periparotid lymph node measuring 2.0 x 1.1 cm in axial cross-section, minimally increased in size since the prior study\par 3. Scattered mildly enlarged lymph nodes in the neck again noted, relatively stable since the prior study\par 4. Abnormal soft tissue within the orbits, not significantly changed\par 5. Stable nodular densities within the right upper lung\par 6. Cervical spondylosis with canal and foraminal stenosis at C4-C5. \par \par He underwent a biopsy of the mass on 10/26/[address] Clifton which showed invasive SCC nonkeratinizing, poorly differentiated, p16 positive, HPV negative. Flow cytometry also done, but results were inconclusive.\par \par PET/CT ([facility])11/12/18\par IMPRESSION: \par 1. Robust lobular hypermetabolic soft tissue mass involving the tongue base consistent with known primary tongue base malignancy. It extends to the level of the hyoid without obvious involvement of the preepiglottic fat. If warranted, consider dedicated MR imaging for further assessment.\par 2. Assessment for metastatic regional lymph nodes from primary tongue malignancy is limited in this patient with evidence of low-grade lymphomatous involvement in the neck. Asymmetrically hypermetabolic but stable appearing lymph nodes in the left level III lymph node would be the most concerning for potential regional ozzy disease from tongue base malignancy.\par 3. Multiple homogeneous lymph nodes associated with low-grade uptake especially in the neck and chest and to a lesser extent the abdomen and pelvis is likely related to patient's history of non-Hodgkin's lymphoma. Associated metabolic activity is consistent with a low-grade viability.\par 4. No definite evidence to suggest focal splenic or marrow involvement of disease. Low-grade diffuse involvement is not excluded.\par 5. Stable appearance of peribronchial nodular foci in the right upper lobe, too small to characterize, but favoring a post inflammatory/infectious process. Other benign type changes are seen in the mid to lower lung fields, which can be reassessed at follow-up imaging.\par 6. No suspicious findings to suggest marginal or regional ozzy recurrence of prostate malignancy or evidence of osteoblastic metastatic disease.\par \par Today, he reports he feels generally well with exception of difficulty swallowing solid foods since the biopsy. He has been eating soups and soft foods. He also reports xerostomia and continues to have a lisp. He is mildly fatigue, but it improves with rest. He continues to work as a . He denies fever, chills, CP, SOB, N/V/D, decreased appetite. He has lost approx 6- 7 pounds since the biopsy on 10/26. Of note, he reports he is claustrophobic, has needed anxiolytics prior to imaging in the past. \par \par His dentist is Dr. Hager ([phone]) and he last saw Dr. Hager in September. \par Family history notable for brother with leukemia. \par \par

## 2019-04-02 NOTE — REASON FOR VISIT
Patient [Post-Treatment Evaluation] : post-treatment evaluation for [Head and Neck Cancer] : head and neck cancer [Spouse] : spouse

## 2019-04-02 NOTE — VITALS
[Maximal Pain Intensity: 8/10] : 8/10 [Least Pain Intensity: 0/10] : 0/10 [Pain Duration: ___] : Pain duration: [unfilled] [Pain Location: ___] : Pain Location: [unfilled] [NSAID/Non-Opioid] : NSAID/Non-Opioid [70: Cares for self; unalbe to carry on normal activity or do active work.] : 70: Cares for self; unable to carry on normal activity or do active work. [ECOG Performance Status: 2 - Ambulatory and capable of all self care but unable to carry out any work activities] : Performance Status: 2 - Ambulatory and capable of all self care but unable to carry out any work activities. Up and about more than 50% of waking hours

## 2019-04-02 NOTE — PHYSICAL EXAM
[Thin] : thin [Normal Oral Cavity] : oral cavity was normal [Normal] : supple with no thyromegaly or masses appreciated [Skin Color & Pigmentation] : normal skin color and pigmentation [Oriented To Time, Place, And Person] : oriented to person, place, and time [de-identified] : tongue movement improved significantly left BOT/FOM, no masses, mucositis almost totally improved. Left hemitongue atrophy. tender to left FOM near submandibular gland. [de-identified] : tender to palpation in left submandibular region

## 2019-04-02 NOTE — DISEASE MANAGEMENT
[Clinical] : TNM Stage: c [III] : III [FreeTextEntry4] : oropharynx [TTNM] : 4 [NTNM] : 0 [MTNM] : 0

## 2019-04-09 DIAGNOSIS — D64.9 ANEMIA, UNSPECIFIED: ICD-10-CM

## 2019-04-09 DIAGNOSIS — Z51.89 ENCOUNTER FOR OTHER SPECIFIED AFTERCARE: ICD-10-CM

## 2019-05-15 ENCOUNTER — FORM ENCOUNTER (OUTPATIENT)
Age: 79
End: 2019-05-15

## 2019-05-16 ENCOUNTER — OUTPATIENT (OUTPATIENT)
Dept: OUTPATIENT SERVICES | Facility: HOSPITAL | Age: 79
LOS: 1 days | End: 2019-05-16
Payer: MEDICARE

## 2019-05-16 ENCOUNTER — APPOINTMENT (OUTPATIENT)
Dept: CT IMAGING | Facility: HOSPITAL | Age: 79
End: 2019-05-16
Payer: MEDICARE

## 2019-05-16 DIAGNOSIS — Z90.49 ACQUIRED ABSENCE OF OTHER SPECIFIED PARTS OF DIGESTIVE TRACT: Chronic | ICD-10-CM

## 2019-05-16 DIAGNOSIS — Z98.890 OTHER SPECIFIED POSTPROCEDURAL STATES: Chronic | ICD-10-CM

## 2019-05-16 LAB — GLUCOSE BLDC GLUCOMTR-MCNC: 118 MG/DL — HIGH (ref 70–99)

## 2019-05-16 PROCEDURE — 70491 CT SOFT TISSUE NECK W/DYE: CPT | Mod: 26

## 2019-05-16 PROCEDURE — 82962 GLUCOSE BLOOD TEST: CPT

## 2019-05-16 PROCEDURE — A9552: CPT

## 2019-05-16 PROCEDURE — 78815 PET IMAGE W/CT SKULL-THIGH: CPT | Mod: 26

## 2019-05-16 PROCEDURE — 70491 CT SOFT TISSUE NECK W/DYE: CPT

## 2019-05-16 PROCEDURE — 78815 PET IMAGE W/CT SKULL-THIGH: CPT

## 2019-05-21 DIAGNOSIS — Z90.49 ACQUIRED ABSENCE OF OTHER SPECIFIED PARTS OF DIGESTIVE TRACT: ICD-10-CM

## 2019-05-21 DIAGNOSIS — R59.0 LOCALIZED ENLARGED LYMPH NODES: ICD-10-CM

## 2019-05-21 DIAGNOSIS — Z85.72 PERSONAL HISTORY OF NON-HODGKIN LYMPHOMAS: ICD-10-CM

## 2019-05-21 DIAGNOSIS — K86.89 OTHER SPECIFIED DISEASES OF PANCREAS: ICD-10-CM

## 2019-05-21 DIAGNOSIS — C02.9 MALIGNANT NEOPLASM OF TONGUE, UNSPECIFIED: ICD-10-CM

## 2019-05-21 DIAGNOSIS — K12.1 OTHER FORMS OF STOMATITIS: ICD-10-CM

## 2019-05-23 ENCOUNTER — APPOINTMENT (OUTPATIENT)
Dept: RADIATION ONCOLOGY | Facility: CLINIC | Age: 79
End: 2019-05-23

## 2019-06-03 ENCOUNTER — APPOINTMENT (OUTPATIENT)
Dept: RADIATION ONCOLOGY | Facility: CLINIC | Age: 79
End: 2019-06-03
Payer: MEDICARE

## 2019-06-03 VITALS
SYSTOLIC BLOOD PRESSURE: 106 MMHG | DIASTOLIC BLOOD PRESSURE: 74 MMHG | RESPIRATION RATE: 16 BRPM | HEART RATE: 118 BPM | OXYGEN SATURATION: 100 % | WEIGHT: 139.7 LBS | BODY MASS INDEX: 20.05 KG/M2

## 2019-06-03 PROCEDURE — 99214 OFFICE O/P EST MOD 30 MIN: CPT | Mod: 25

## 2019-06-03 PROCEDURE — 31575 DIAGNOSTIC LARYNGOSCOPY: CPT

## 2019-06-03 RX ORDER — GABAPENTIN 300 MG/1
300 CAPSULE ORAL
Qty: 120 | Refills: 1 | Status: DISCONTINUED | COMMUNITY
Start: 2019-03-19 | End: 2019-06-03

## 2019-06-03 NOTE — HISTORY OF PRESENT ILLNESS
[FreeTextEntry1] : Mr. Nathaniel Fuentes has completed radiation therapy to the oropharynx/ neck for a total of 7000 cGy over 35 fractions from 12/19/18 to 2/8/19. He did not have any unexpected treatment complications during the course of treatment. He received 5 out of 7 cycles of concurrent weekly Cisplatin with Dr. Blandon. \par \par 6/3/19 - Follow-up\par Mr. Fuentes presents today for routine follow-up.  He was last seen here on 4/2/19.  Plan at that time was for PET/CT and CT neck with contrast in 6 weeks, continue weekly visits with Dr. Blandon, continue Ensure 3 cans/day and increase calories, continue gabapentin (300mg AM, 600mg PM), stop trazodone, continue VNS and SLP and exercises.  \par \par PET/CT 5/16/19 - Impression: \par 1. Increased FDG uptake within the root of the tongue.  While some portion of this activity may be related to treatment and subsequent ulceration, the possibility of persistent disease is raised because of the intensity and distribution of the uptake.\par 2.  Scattered subcentimeter mediastinal and para-aortic lymph nodes. \par 3.  FDG uptake diffusely throughout the stomach and may represent infectious versus inflammatory gastritis.  Please note that a similar pattern was seen on the patient's prior PET scan dated 11/12/2018.\par \par CT neck with contrast 5/16/19 - Impression: There is now a large ulcer involving the left floor of mouth that is lined by heterogenously enhancing soft tissue that is FDG avid.  Findings are suspicious for disease persistence along the ulcer walls.  Correlation with visual inspection and possibly tissue sampling is recommended for further characterization.  Presumed lymphoproliferative disease in the orbits and cervical lymph nodes has nearly resolved.  \par \par He last saw Dr. Blandon one week ago.  Today, he reports that he has been "up and down" since we last saw him.  He states that approximately two weeks ago, he was experiencing bloody stool.  His wife called an ambulance and he was taken to the ED at [facility].  He states that he was found to be anemic with hemoglobin of 3 and received 3 blood transfusions.  He  underwent GI workup including upper endoscopy which showed bleeding ulcerations in the stomach; all pathology was benign.  He notes that he had been taking Aleve for pain prior to this episode and has since stopped.  He was discharged home 3 days later.  He was advised to take protonix daily and follow-up with GI.  He states that he has stopped protonix because he has not had any episodes of bleeding.  He did not keep his follow-up appointment with GI as he was not feeling well that day.  His wife states she plans to reschedule.  \par \par He notes continued left neck pain and edema relieved with warm compresses and tylenol.  He states that is has worsened over the past 2-3 weeks.  Of note, he also reports stopping gabapentin around that time because he didn't think it was helping and it is "just another thing."  He reports continued odynophagia which prevents him from eating solid food.  He is still on a liquid diet with ensure.  He states he lost 12 pounds during his recent hospitalization but has started gaining some weight again.  He reports that xerostomia has resolved.  He plans to follow-up with his PMD Dr. Mckeon this week and will also see Dr. Clifton today.  \par \par 4/2/19- FOLLOW UP\par Mr. Fuentes presents for follow up. When he was last seen on 3/19/19, he was volume depleted and continuing to report throat pain. Plan at the time was for labs with Dr. BOBBY, complete course of Diflucan, increase Ensure plus to 3 cans/ day, continue MMW and Gabapentin, stop Trazodone, continue VNS services, and follow up with SLP. \par \par He saw SLP on 3/28/19, at which time he recommended H&N exercises to improve swallow strength. Complains of irritation in throat but no pain. Stopped trazadone and finished course of diflucan. Reports more energy to walk and exercise. Able to tolerate 3 cans of ensure a day. No longer using MMW, says its effects don’t last long. Able to tolerate liquids and soft foods. Taste beginning to come back. Constipation persists. Seeing Dr. BOBBY today for procrit. Sees him weekly per wife. He decided to cancel his trip to [place] this week. \par \par 3/19/19 - FOLLOW UP\par Today Using MMW, Biotene, he reports throat pain still persist, not using gabapentin regularly, encouraged to use as ordered to get relief. His wife says she is now forcing him to use gabapentin and to use MMW regularly. She also made sure he started the fluconazole RX. Tolerating only liquids, soft foods like yoghurt, potatoes mashed, cream of wheat. Assessed by visiting nurse, will start PT and Sp/sw therapy next week. Carries out physical activity as tolerated, walks 4 blocks daily. Constipation is relieved by miralax.\par \par 3/5/19- Pt is here for a followup 3/5/19. Today he states he feels better, tolerates puree foods(warm cereal and soups), 2-3 Ensure shakes and whey protein recommended by Nutritionist. Increased PO fluids now to 2 Liters per day. Reports Painful throat while swallowing. Relieved by MMW and biotene. Denies any other pain. Visit with Dr Blandon today at 1pm, will review bloodwork after. ER Visit from Dr Blandon office on 2/26/19 for very low hemoglobin, received blood products, Iron and IVF. Says he is taking diflucan which will complete today (although we sent in 15 pills 1 week ago - wife will check # of pills at home). \par \par 2/26/19- PTE\par Mr. Fuentes returns for an emergent PTE. Several phone calls took place over the past few weeks after he completed RT regarding poor PO intake. He was Rx'd MMW. He continued to lose weight since completion of treatment. On the night of 2/18/18, he fell in the middle of the night (did not hit his head as per patient's wife) due to dizziness. He declined coming in last week for assessment/ labs; he and his wife reported improved PO intake. His wife Selma emailed over the weekend reporting another fall due to slipping on liquid on the floor. She stated he had increased his fluid intake and was doing quite well until the fall. They also called his PCP Dr. Mckeon to have the Trazodone prescription refilled as he had been having Restless Leg Syndrome most nights. Patient and his wife thought this was the cause of the second fall as he was quite stable in the day but became off balance in the night. He last saw Dr. Blandon on 2/15/19 and has a follow up scheduled for this Friday. \par \par Today, he reports he is eating and drinking better. However, he feels generally weak and also has lightheadedness with position changes. He admits that he did hit his head with both falls, but denies headaches, LOC, confusion. He has lost four pounds since last seen on 2/8, but it is an improvement since last week (he has gained 4 pounds since last week). He is drinking 2 Ensure plus per day. Also eating soft/ pureed foods, soups, broth. He reports sore throat that is improving; was 9/ 10 last week, but now 5/ 10. Using MMW, which is helpful. Also reports xerostomia, using Biotene but only once a day. Patient and wife inquired about Trazodone; he is taking 100 mg daily for restless leg syndrome as per his PCP, but it makes him feel "disoriented." \par \par ______________________________________________\par ONCOLOGIC HISTORY \par Mr. 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 Notably, he has PMH non-Hodgkin's lymphoma (patient unsure of which subtype), diagnosed in 1999, and treated by Dr. Shepherd with CHOP and Fludarabine. \par \par He was followed by Dr. Clifton (Head & Neck) and had complaints of swelling and pain over the left submandibular gland in 2017. A CT scan of the neck done 12/12/17 showed no evidence of pharyngeal mass, asymmetry or laryngeal mass. There were numerous cervical lymph nodes which were larger on the left, as well as enlarged subclavian and axillary lymph nodes. \par \par He continued to have complaints of left neck pain and had occasional metallic taste in mouth in July 2018. He also developed a lisp/ difficulty speaking in July 2018.\par \par He underwent CT neck on 10/15/18, which showed the following:\par IMPRESSION: \par 1. Soft tissue mass involving the left tongue base compatible with a tongue base neoplasm\par 2. Superficial nodule involving the right cheek which may represent an enlarged periparotid lymph node measuring 2.0 x 1.1 cm in axial cross-section, minimally increased in size since the prior study\par 3. Scattered mildly enlarged lymph nodes in the neck again noted, relatively stable since the prior study\par 4. Abnormal soft tissue within the orbits, not significantly changed\par 5. Stable nodular densities within the right upper lung\par 6. Cervical spondylosis with canal and foraminal stenosis at C4-C5. \par \par He underwent a biopsy of the mass on 10/26/18 by Dr. Clifton which showed invasive SCC nonkeratinizing, poorly differentiated, p16 positive, HPV negative. Flow cytometry also done, but results were inconclusive.\par \par PET/CT ([facility])11/12/18\par IMPRESSION: \par 1. Robust lobular hypermetabolic soft tissue mass involving the tongue base consistent with known primary tongue base malignancy. It extends to the level of the hyoid without obvious involvement of the preepiglottic fat. If warranted, consider dedicated MR imaging for further assessment.\par 2. Assessment for metastatic regional lymph nodes from primary tongue malignancy is limited in this patient with evidence of low-grade lymphomatous involvement in the neck. Asymmetrically hypermetabolic but stable appearing lymph nodes in the left level III lymph node would be the most concerning for potential regional ozzy disease from tongue base malignancy.\par 3. Multiple homogeneous lymph nodes associated with low-grade uptake especially in the neck and chest and to a lesser extent the abdomen and pelvis is likely related to patient's history of non-Hodgkin's lymphoma. Associated metabolic activity is consistent with a low-grade viability.\par 4. No definite evidence to suggest focal splenic or marrow involvement of disease. Low-grade diffuse involvement is not excluded.\par 5. Stable appearance of peribronchial nodular foci in the right upper lobe, too small to characterize, but favoring a post inflammatory/infectious process. Other benign type changes are seen in the mid to lower lung fields, which can be reassessed at follow-up imaging.\par 6. No suspicious findings to suggest marginal or regional ozzy recurrence of prostate malignancy or evidence of osteoblastic metastatic disease.\par \par Today, he reports he feels generally well with exception of difficulty swallowing solid foods since the biopsy. He has been eating soups and soft foods. He also reports xerostomia and continues to have a lisp. He is mildly fatigue, but it improves with rest. He continues to work as a . He denies fever, chills, CP, SOB, N/V/D, decreased appetite. He has lost approx 6- 7 pounds since the biopsy on 10/26. Of note, he reports he is claustrophobic, has needed anxiolytics prior to imaging in the past. \par \par His dentist is Dr. Hager ([phone]) and he last saw Dr. Hager in September. \par Family history notable for brother with leukemia. \par \par

## 2019-06-03 NOTE — REVIEW OF SYSTEMS
[Negative] : Integumentary [Fever] : no fever [Chills] : no chills [Cough] : no cough [FreeTextEntry4] : see HPI [FreeTextEntry7] : see HPI

## 2019-06-03 NOTE — PHYSICAL EXAM
[Heart Rate And Rhythm] : heart rate and rhythm were normal [Normal] : no focal deficits [Heart Sounds] : normal S1 and S2 [de-identified] : tongue movement improved significantly left BOT/FOM, no masses, mucositis almost totally improved. Left hemitongue atrophy. tender to left FOM near submandibular gland. [de-identified] : tender to palpation in left submandibular region

## 2019-06-03 NOTE — DATA REVIEWED
[FreeTextEntry1] : I have personally reviewed all relevant imaging studies (PET, CT) and I have discussed the case with the referring physician Dr. Clifton and Dr. Caba.\par

## 2019-06-03 NOTE — PROCEDURE
[Lesion] : lesion identified by mirror examination needing further evaluation [Dysphagia] : dysphagia not clearly evaluated by indirect laryngoscopy [Globus] : globus [Flexible Endoscope] : examined with the flexible endoscope [Topical Lidocaine] : topical lidocaine [Photographs Taken] : photographs taken [Normal] : the nasopharynx was normal [de-identified] : large caseating ulcer in left BOT with heaped edges  [de-identified] : epiglottis is scarred with minimal movement

## 2019-06-12 NOTE — VITALS
[Maximal Pain Intensity: 8/10] : 8/10 [Least Pain Intensity: 0/10] : 0/10 [Pain Location: ___] : Pain Location: [unfilled] [OTC] : OTC [ECOG Performance Status: 2 - Ambulatory and capable of all self care but unable to carry out any work activities] : Performance Status: 2 - Ambulatory and capable of all self care but unable to carry out any work activities. Up and about more than 50% of waking hours [70: Cares for self; unalbe to carry on normal activity or do active work.] : 70: Cares for self; unable to carry on normal activity or do active work.

## 2019-06-13 ENCOUNTER — APPOINTMENT (OUTPATIENT)
Dept: RADIATION ONCOLOGY | Facility: CLINIC | Age: 79
End: 2019-06-13
Payer: MEDICARE

## 2019-06-13 VITALS
RESPIRATION RATE: 16 BRPM | DIASTOLIC BLOOD PRESSURE: 75 MMHG | HEART RATE: 96 BPM | OXYGEN SATURATION: 100 % | SYSTOLIC BLOOD PRESSURE: 104 MMHG

## 2019-06-13 DIAGNOSIS — Z87.19 PERSONAL HISTORY OF OTHER DISEASES OF THE DIGESTIVE SYSTEM: ICD-10-CM

## 2019-06-13 DIAGNOSIS — J39.2 OTHER DISEASES OF PHARYNX: ICD-10-CM

## 2019-06-13 PROCEDURE — 99215 OFFICE O/P EST HI 40 MIN: CPT | Mod: 25

## 2019-06-13 NOTE — PROCEDURE
[Lesion] : lesion identified by mirror examination needing further evaluation [Dysphagia] : dysphagia not clearly evaluated by indirect laryngoscopy [Flexible Endoscope] : examined with the flexible endoscope [Globus] : globus [Topical Lidocaine] : topical lidocaine [Normal] : the nasopharynx was normal [Photographs Taken] : photographs taken [de-identified] : large caseating ulcer in left BOT with heaped edges  [de-identified] : epiglottis is scarred with minimal movement

## 2019-06-14 PROBLEM — Z87.19 HISTORY OF GI BLEED: Status: ACTIVE | Noted: 2019-06-03

## 2019-06-14 PROBLEM — J39.2 PHARYNGEAL ULCER: Status: ACTIVE | Noted: 2019-06-03

## 2019-06-14 NOTE — DISEASE MANAGEMENT
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

## 2019-06-14 NOTE — REVIEW OF SYSTEMS
[Mucositis Oral: Grade 1 - Asymptomatic or mild symptoms; intervention not indicated] : Mucositis Oral: Grade 1 - Asymptomatic or mild symptoms; intervention not indicated [Fatigue: Grade 1 - Fatigue relieved by rest] : Fatigue: Grade 1 - Fatigue relieved by rest [Pharyngeal Mucositis: Grade 1 - Endoscopic findings only; minimal symptoms with normal oral intake; mild pain but analgesics not indicated] : Pharyngeal Mucositis: Grade 1 - Endoscopic findings only; minimal symptoms with normal oral intake; mild pain but analgesics not indicated [Negative] : Gastrointestinal [FreeTextEntry4] : see HPI

## 2019-06-14 NOTE — PHYSICAL EXAM
[Thin] : thin [Normal Oral Cavity] : oral cavity was normal [Heart Sounds] : normal S1 and S2 [Skin Color & Pigmentation] : normal skin color and pigmentation [Heart Rate And Rhythm] : heart rate and rhythm were normal [Oriented To Time, Place, And Person] : oriented to person, place, and time [Normal] : normoactive bowel sounds, soft and nontender, no hepatosplenomegaly or masses appreciated [de-identified] : grade 1 lymphedema [de-identified] : tongue movement improved significantly left BOT/FOM, no masses, mucositis almost totally improved. No evidence of thrush. Improved appearance to mouth and less odor.  Left hemitongue atrophy. less tender to left FOM near submandibular gland

## 2019-06-14 NOTE — DATA REVIEWED
[FreeTextEntry1] : I have personally reviewed all relevant imaging studies (PET, CT) and pathology and endoscopy reports. I have discussed the case with the referring physician Dr. Clifton and Dr. Caba.\par

## 2019-06-14 NOTE — HISTORY OF PRESENT ILLNESS
[FreeTextEntry1] : Mr. Nathaniel Fuentes has completed radiation therapy to the oropharynx/ neck for a total of 7000 cGy over 35 fractions from 12/19/18 to 2/8/19. He did not have any unexpected treatment complications during the course of treatment. He received 5 out of 7 cycles of concurrent weekly Cisplatin with Dr. Blandon.  He has had a complicated post-CRT course detailed in prior notes and below.\par \par 6/13/19 - SNA\par Mr. Fuentes returns for SNA.  He was last seen here on 6/3/19 at which time he was found to have a deep BOT ulcer with heaped edges on clinical exam.  PET/CT and CT were also concerning for uptake in BOT.  He was given a course of diflucan, magic mouthwash, and advised to use baking soda and meat tenderizer oral rinses and restart gabapentin and protonix.  He was also to be evaluated by Dr. Clifton for possible biopsy - she has decided to re-evaluate before deciding on biopsy.  \par \par Upper Endoscopy was done at [facility] (Dr. Santamaria) for Melena on 5/22/19; report shows "diffuse severe mucoosal changes characterized by granularity, inflammation, and nodularity were found in the cardia, in the gastric fundus, and gastric body. 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 Pylori and focal granulation tissue and fibrinopurulent debris. [facility] Path review pending.  He saw Dr. Blandon on 6/11/19 and discussed chemo vs. RT.  H/H was 12.0/38.3 on 6/11 (after Procrit).  \par \par He is here today for consideration of radiation therapy.  He states that his right facial pain is unchanged.  He will complete the course of diflucan in 3 days and is using mouth rinses with the exception of magic mouthwash.  He was encouraged to start using it.  He notes that he is "here for another reason" today.  He denies GI complaints including abdominal pain, nausea, vomiting, constipation, diarrhea, melena, hematemesis.  He has not yet followed up with GI for colonoscopy.  \par \par 6/3/19 - Follow-up\par Mr. Fuentes presents today for routine follow-up.  He was last seen here on 4/2/19.  Plan at that time was for PET/CT and CT neck with contrast in 6 weeks, continue weekly visits with Dr. Blandon, continue Ensure 3 cans/day and increase calories, continue gabapentin (300mg AM, 600mg PM), stop trazodone, continue VNS and SLP and exercises.  \par \par PET/CT 5/16/19 - Impression: \par 1. Increased FDG uptake within the root of the tongue.  While some portion of this activity may be related to treatment and subsequent ulceration, the possibility of persistent disease is raised because of the intensity and distribution of the uptake.\par 2.  Scattered subcentimeter mediastinal and para-aortic lymph nodes. \par 3.  FDG uptake diffusely throughout the stomach and may represent infectious versus inflammatory gastritis.  Please note that a similar pattern was seen on the patient's prior PET scan dated 11/12/2018.\par \par CT neck with contrast 5/16/19 - Impression: There is now a large ulcer involving the left floor of mouth that is lined by heterogenously enhancing soft tissue that is FDG avid.  Findings are suspicious for disease persistence along the ulcer walls.  Correlation with visual inspection and possibly tissue sampling is recommended for further characterization.  Presumed lymphoproliferative disease in the orbits and cervical lymph nodes has nearly resolved.  \par \par He last saw Dr. Blandon one week ago.  Today, he reports that he has been "up and down" since we last saw him.  He states that approximately two weeks ago, he was experiencing bloody stool.  His wife called an ambulance and he was taken to the ED at [facility].  He states that he was found to be anemic with hemoglobin of 3 and received 3 blood transfusions.  He  underwent GI workup including upper endoscopy which showed bleeding ulcerations in the stomach; all pathology was benign.  He notes that he had been taking Aleve for pain prior to this episode and has since stopped.  He was discharged home 3 days later.  He was advised to take protonix daily and follow-up with GI.  He states that he has stopped protonix because he has not had any episodes of bleeding.  He did not keep his follow-up appointment with GI as he was not feeling well that day.  His wife states she plans to reschedule.  \par \par He notes continued left neck pain and edema relieved with warm compresses and tylenol.  He states that is has worsened over the past 2-3 weeks.  Of note, he also reports stopping gabapentin around that time because he didn't think it was helping and it is "just another thing."  He reports continued odynophagia which prevents him from eating solid food.  He is still on a liquid diet with ensure.  He states he lost 12 pounds during his recent hospitalization but has started gaining some weight again.  He reports that xerostomia has resolved.  He plans to follow-up with his PMD Dr. Mckeon this week and will also see Dr. Clifton today.  \par \par 4/2/19- FOLLOW UP\par Mr. Fuentes presents for follow up. When he was last seen on 3/19/19, he was volume depleted and continuing to report throat pain. Plan at the time was for labs with Dr. BOBBY, complete course of Diflucan, increase Ensure plus to 3 cans/ day, continue MMW and Gabapentin, stop Trazodone, continue VNS services, and follow up with SLP. \par \par He saw SLP on 3/28/19, at which time he recommended H&N exercises to improve swallow strength. Complains of irritation in throat but no pain. Stopped trazadone and finished course of diflucan. Reports more energy to walk and exercise. Able to tolerate 3 cans of ensure a day. No longer using MMW, says its effects don’t last long. Able to tolerate liquids and soft foods. Taste beginning to come back. Constipation persists. Seeing Dr. BOBBY today for eleno. Sees him weekly per wife. He decided to cancel his trip to [place] this week. \par \par 3/19/19 - FOLLOW UP\par Today Using MMW, Biotene, he reports throat pain still persist, not using gabapentin regularly, encouraged to use as ordered to get relief. His wife says she is now forcing him to use gabapentin and to use MMW regularly. She also made sure he started the fluconazole RX. Tolerating only liquids, soft foods like yoghurt, potatoes mashed, cream of wheat. Assessed by visiting nurse, will start PT and Sp/sw therapy next week. Carries out physical activity as tolerated, walks 4 blocks daily. Constipation is relieved by miralax.\par \par 3/5/19- Pt is here for a followup 3/5/19. Today he states he feels better, tolerates puree foods(warm cereal and soups), 2-3 Ensure shakes and whey protein recommended by Nutritionist. Increased PO fluids now to 2 Liters per day. Reports Painful throat while swallowing. Relieved by MMW and biotene. Denies any other pain. Visit with Dr Blandon today at 1pm, will review bloodwork after. ER Visit from Dr Blandon office on 2/26/19 for very low hemoglobin, received blood products, Iron and IVF. Says he is taking diflucan which will complete today (although we sent in 15 pills 1 week ago - wife will check # of pills at home). \par \par 2/26/19- PTE\par Mr. Fuentes returns for an emergent PTE. Several phone calls took place over the past few weeks after he completed RT regarding poor PO intake. He was Rx'd MMW. He continued to lose weight since completion of treatment. On the night of 2/18/18, he fell in the middle of the night (did not hit his head as per patient's wife) due to dizziness. He declined coming in last week for assessment/ labs; he and his wife reported improved PO intake. His wife Selma emailed over the weekend reporting another fall due to slipping on liquid on the floor. She stated he had increased his fluid intake and was doing quite well until the fall. They also called his PCP Dr. Mckeon to have the Trazodone prescription refilled as he had been having Restless Leg Syndrome most nights. Patient and his wife thought this was the cause of the second fall as he was quite stable in the day but became off balance in the night. He last saw Dr. Blandon on 2/15/19 and has a follow up scheduled for this Friday. \par \par Today, he reports he is eating and drinking better. However, he feels generally weak and also has lightheadedness with position changes. He admits that he did hit his head with both falls, but denies headaches, LOC, confusion. He has lost four pounds since last seen on 2/8, but it is an improvement since last week (he has gained 4 pounds since last week). He is drinking 2 Ensure plus per day. Also eating soft/ pureed foods, soups, broth. He reports sore throat that is improving; was 9/ 10 last week, but now 5/ 10. Using MMW, which is helpful. Also reports xerostomia, using Biotene but only once a day. Patient and wife inquired about Trazodone; he is taking 100 mg daily for restless leg syndrome as per his PCP, but it makes him feel "disoriented." \par \par ______________________________________________\par ONCOLOGIC HISTORY \par Mr. 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 Notably, he has PMH non-Hodgkin's lymphoma (patient unsure of which subtype), diagnosed in 1999, and treated by Dr. Shepherd with CHOP and Fludarabine. \par \par He was followed by Dr. Clifton (Head & Neck) and had complaints of swelling and pain over the left submandibular gland in 2017. A CT scan of the neck done 12/12/17 showed no evidence of pharyngeal mass, asymmetry or laryngeal mass. There were numerous cervical lymph nodes which were larger on the left, as well as enlarged subclavian and axillary lymph nodes. \par \par He continued to have complaints of left neck pain and had occasional metallic taste in mouth in July 2018. He also developed a lisp/ difficulty speaking in July 2018.\par \par He underwent CT neck on 10/15/18, which showed the following:\par IMPRESSION: \par 1. Soft tissue mass involving the left tongue base compatible with a tongue base neoplasm\par 2. Superficial nodule involving the right cheek which may represent an enlarged periparotid lymph node measuring 2.0 x 1.1 cm in axial cross-section, minimally increased in size since the prior study\par 3. Scattered mildly enlarged lymph nodes in the neck again noted, relatively stable since the prior study\par 4. Abnormal soft tissue within the orbits, not significantly changed\par 5. Stable nodular densities within the right upper lung\par 6. Cervical spondylosis with canal and foraminal stenosis at C4-C5. \par \par He underwent a biopsy of the mass on 10/26/18 by Dr. Clifton which showed invasive SCC nonkeratinizing, poorly differentiated, p16 positive, HPV negative. Flow cytometry also done, but results were inconclusive.\par \par PET/CT ([facility])11/12/18\par IMPRESSION: \par 1. Robust lobular hypermetabolic soft tissue mass involving the tongue base consistent with known primary tongue base malignancy. It extends to the level of the hyoid without obvious involvement of the preepiglottic fat. If warranted, consider dedicated MR imaging for further assessment.\par 2. Assessment for metastatic regional lymph nodes from primary tongue malignancy is limited in this patient with evidence of low-grade lymphomatous involvement in the neck. Asymmetrically hypermetabolic but stable appearing lymph nodes in the left level III lymph node would be the most concerning for potential regional ozzy disease from tongue base malignancy.\par 3. Multiple homogeneous lymph nodes associated with low-grade uptake especially in the neck and chest and to a lesser extent the abdomen and pelvis is likely related to patient's history of non-Hodgkin's lymphoma. Associated metabolic activity is consistent with a low-grade viability.\par 4. No definite evidence to suggest focal splenic or marrow involvement of disease. Low-grade diffuse involvement is not excluded.\par 5. Stable appearance of peribronchial nodular foci in the right upper lobe, too small to characterize, but favoring a post inflammatory/infectious process. Other benign type changes are seen in the mid to lower lung fields, which can be reassessed at follow-up imaging.\par 6. No suspicious findings to suggest marginal or regional ozzy recurrence of prostate malignancy or evidence of osteoblastic metastatic disease.\par \par Today, he reports he feels generally well with exception of difficulty swallowing solid foods since the biopsy. He has been eating soups and soft foods. He also reports xerostomia and continues to have a lisp. He is mildly fatigue, but it improves with rest. He continues to work as a . He denies fever, chills, CP, SOB, N/V/D, decreased appetite. He has lost approx 6- 7 pounds since the biopsy on 10/26. Of note, he reports he is claustrophobic, has needed anxiolytics prior to imaging in the past. \par \par His dentist is Dr. Hager ([phone]) and he last saw Dr. Hager in September. \par Family history notable for brother with leukemia. \par \par

## 2019-07-01 ENCOUNTER — APPOINTMENT (OUTPATIENT)
Dept: RADIATION ONCOLOGY | Facility: CLINIC | Age: 79
End: 2019-07-01

## 2019-07-01 ENCOUNTER — RESULT REVIEW (OUTPATIENT)
Age: 79
End: 2019-07-01

## 2019-07-01 ENCOUNTER — OUTPATIENT (OUTPATIENT)
Dept: OUTPATIENT SERVICES | Facility: HOSPITAL | Age: 79
LOS: 1 days | End: 2019-07-01
Payer: MEDICARE

## 2019-07-01 DIAGNOSIS — C01 MALIGNANT NEOPLASM OF BASE OF TONGUE: ICD-10-CM

## 2019-07-01 DIAGNOSIS — C88.4 EXTRANODAL MARGINAL ZONE B-CELL LYMPHOMA OF MUCOSA-ASSOCIATED LYMPHOID TISSUE [MALT-LYMPHOMA]: ICD-10-CM

## 2019-07-01 DIAGNOSIS — Z98.890 OTHER SPECIFIED POSTPROCEDURAL STATES: Chronic | ICD-10-CM

## 2019-07-01 DIAGNOSIS — Z90.49 ACQUIRED ABSENCE OF OTHER SPECIFIED PARTS OF DIGESTIVE TRACT: Chronic | ICD-10-CM

## 2019-07-01 LAB — SURGICAL PATHOLOGY STUDY: SIGNIFICANT CHANGE UP

## 2019-07-03 NOTE — VITALS
[Least Pain Intensity: 0/10] : 0/10 [Maximal Pain Intensity: 8/10] : 8/10 [Pain Location: ___] : Pain Location: [unfilled] [70: Cares for self; unalbe to carry on normal activity or do active work.] : 70: Cares for self; unable to carry on normal activity or do active work. [OTC] : OTC [ECOG Performance Status: 2 - Ambulatory and capable of all self care but unable to carry out any work activities] : Performance Status: 2 - Ambulatory and capable of all self care but unable to carry out any work activities. Up and about more than 50% of waking hours

## 2019-07-08 ENCOUNTER — APPOINTMENT (OUTPATIENT)
Dept: RADIATION ONCOLOGY | Facility: CLINIC | Age: 79
End: 2019-07-08
Payer: MEDICARE

## 2019-07-08 PROCEDURE — 99213 OFFICE O/P EST LOW 20 MIN: CPT

## 2019-07-08 RX ORDER — NYSTATIN 100000 [USP'U]/ML
100000 SUSPENSION ORAL
Qty: 700 | Refills: 0 | Status: DISCONTINUED | COMMUNITY
Start: 2019-06-03 | End: 2019-07-08

## 2019-07-08 RX ORDER — FLUCONAZOLE 100 MG/1
100 TABLET ORAL
Qty: 15 | Refills: 0 | Status: DISCONTINUED | COMMUNITY
Start: 2019-06-03 | End: 2019-07-08

## 2019-07-08 NOTE — VITALS
[Maximal Pain Intensity: 8/10] : 8/10 [Least Pain Intensity: 0/10] : 0/10 [Pain Location: ___] : Pain Location: [unfilled] [OTC] : OTC [70: Cares for self; unalbe to carry on normal activity or do active work.] : 70: Cares for self; unable to carry on normal activity or do active work. [ECOG Performance Status: 2 - Ambulatory and capable of all self care but unable to carry out any work activities] : Performance Status: 2 - Ambulatory and capable of all self care but unable to carry out any work activities. Up and about more than 50% of waking hours

## 2019-07-08 NOTE — PHYSICAL EXAM
[Thin] : thin [Normal Oral Cavity] : oral cavity was normal [Heart Rate And Rhythm] : heart rate and rhythm were normal [Heart Sounds] : normal S1 and S2 [Normal] : normoactive bowel sounds, soft and nontender, no hepatosplenomegaly or masses appreciated [Skin Color & Pigmentation] : normal skin color and pigmentation [Oriented To Time, Place, And Person] : oriented to person, place, and time [de-identified] : tongue movement improved significantly left BOT/FOM, no masses, mucositis almost totally improved. No evidence of thrush. Improved appearance to mouth and less odor.  Left hemitongue atrophy. less tender to left FOM near submandibular gland [de-identified] : grade 1 lymphedema

## 2019-07-08 NOTE — REVIEW OF SYSTEMS
[Negative] : Neurological [Fatigue: Grade 1 - Fatigue relieved by rest] : Fatigue: Grade 1 - Fatigue relieved by rest [Mucositis Oral: Grade 1 - Asymptomatic or mild symptoms; intervention not indicated] : Mucositis Oral: Grade 1 - Asymptomatic or mild symptoms; intervention not indicated [Pharyngeal Mucositis: Grade 1 - Endoscopic findings only; minimal symptoms with normal oral intake; mild pain but analgesics not indicated] : Pharyngeal Mucositis: Grade 1 - Endoscopic findings only; minimal symptoms with normal oral intake; mild pain but analgesics not indicated [FreeTextEntry4] : see HPI

## 2019-07-08 NOTE — REVIEW OF SYSTEMS
[Fatigue: Grade 1 - Fatigue relieved by rest] : Fatigue: Grade 1 - Fatigue relieved by rest [Pharyngeal Mucositis: Grade 1 - Endoscopic findings only; minimal symptoms with normal oral intake; mild pain but analgesics not indicated] : Pharyngeal Mucositis: Grade 1 - Endoscopic findings only; minimal symptoms with normal oral intake; mild pain but analgesics not indicated [Mucositis Oral: Grade 1 - Asymptomatic or mild symptoms; intervention not indicated] : Mucositis Oral: Grade 1 - Asymptomatic or mild symptoms; intervention not indicated [Negative] : Integumentary [FreeTextEntry4] : see HPI

## 2019-07-08 NOTE — HISTORY OF PRESENT ILLNESS
[FreeTextEntry1] : Mr. Nathaniel Fuentse has completed radiation therapy to the oropharynx/ neck for a total of 7000 cGy over 35 fractions from 12/19/18 to 2/8/19. He did not have any unexpected treatment complications during the course of treatment. He received 5 out of 7 cycles of concurrent weekly Cisplatin with Dr. Blandon.  He has had a complicated post-CRT course detailed in prior notes and below.  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  He is now undergoing RT to a dose of 3000 cGy to the stomach.  \par \par 7/8/19 - OTV - Mr. Fuentes has completed 150 cGy / 3000 cGy to the stomach.  Today, he xxxx\par \par \par \par 6/13/19 - SNA\par Mr. Fuentes returns for SNA.  He was last seen here on 6/3/19 at which time he was found to have a deep BOT ulcer with heaped edges on clinical exam.  PET/CT and CT were also concerning for uptake in BOT.  He was given a course of diflucan, magic mouthwash, and advised to use baking soda and meat tenderizer oral rinses and restart gabapentin and protonix.  He was also to be evaluated by Dr. Clifton for possible biopsy - she has decided to re-evaluate before deciding on biopsy.  \par \par Upper Endoscopy was done at [facility] (Dr. Santamaria) for Melena on 5/22/19; report shows "diffuse severe mucoosal changes characterized by granularity, inflammation, and nodularity were found in the cardia, in the gastric fundus, and gastric body. 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 Pylori and focal granulation tissue and fibrinopurulent debris. [facility] Path review pending.  He saw Dr. Blandon on 6/11/19 and discussed chemo vs. RT.  H/H was 12.0/38.3 on 6/11 (after Procrit).  \par \par He is here today for consideration of radiation therapy.  He states that his right facial pain is unchanged.  He will complete the course of diflucan in 3 days and is using mouth rinses with the exception of magic mouthwash.  He was encouraged to start using it.  He notes that he is "here for another reason" today.  He denies GI complaints including abdominal pain, nausea, vomiting, constipation, diarrhea, melena, hematemesis.  He has not yet followed up with GI for colonoscopy.  \par \par 6/3/19 - Follow-up\par Mr. Fuentes presents today for routine follow-up.  He was last seen here on 4/2/19.  Plan at that time was for PET/CT and CT neck with contrast in 6 weeks, continue weekly visits with Dr. Blandon, continue Ensure 3 cans/day and increase calories, continue gabapentin (300mg AM, 600mg PM), stop trazodone, continue VNS and SLP and exercises.  \par \par PET/CT 5/16/19 - Impression: \par 1. Increased FDG uptake within the root of the tongue.  While some portion of this activity may be related to treatment and subsequent ulceration, the possibility of persistent disease is raised because of the intensity and distribution of the uptake.\par 2.  Scattered subcentimeter mediastinal and para-aortic lymph nodes. \par 3.  FDG uptake diffusely throughout the stomach and may represent infectious versus inflammatory gastritis.  Please note that a similar pattern was seen on the patient's prior PET scan dated 11/12/2018.\par \par CT neck with contrast 5/16/19 - Impression: There is now a large ulcer involving the left floor of mouth that is lined by heterogenously enhancing soft tissue that is FDG avid.  Findings are suspicious for disease persistence along the ulcer walls.  Correlation with visual inspection and possibly tissue sampling is recommended for further characterization.  Presumed lymphoproliferative disease in the orbits and cervical lymph nodes has nearly resolved.  \par \par He last saw Dr. Blandon one week ago.  Today, he reports that he has been "up and down" since we last saw him.  He states that approximately two weeks ago, he was experiencing bloody stool.  His wife called an ambulance and he was taken to the ED at [facility].  He states that he was found to be anemic with hemoglobin of 3 and received 3 blood transfusions.  He  underwent GI workup including upper endoscopy which showed bleeding ulcerations in the stomach; all pathology was benign.  He notes that he had been taking Aleve for pain prior to this episode and has since stopped.  He was discharged home 3 days later.  He was advised to take protonix daily and follow-up with GI.  He states that he has stopped protonix because he has not had any episodes of bleeding.  He did not keep his follow-up appointment with GI as he was not feeling well that day.  His wife states she plans to reschedule.  \par \par He notes continued left neck pain and edema relieved with warm compresses and tylenol.  He states that is has worsened over the past 2-3 weeks.  Of note, he also reports stopping gabapentin around that time because he didn't think it was helping and it is "just another thing."  He reports continued odynophagia which prevents him from eating solid food.  He is still on a liquid diet with ensure.  He states he lost 12 pounds during his recent hospitalization but has started gaining some weight again.  He reports that xerostomia has resolved.  He plans to follow-up with his PMD Dr. Mckeon this week and will also see Dr. Clifton today.  \par \par 4/2/19- FOLLOW UP\par Mr. Fuentes presents for follow up. When he was last seen on 3/19/19, he was volume depleted and continuing to report throat pain. Plan at the time was for labs with Dr. BOBBY, complete course of Diflucan, increase Ensure plus to 3 cans/ day, continue MMW and Gabapentin, stop Trazodone, continue VNS services, and follow up with SLP. \par \par He saw SLP on 3/28/19, at which time he recommended H&N exercises to improve swallow strength. Complains of irritation in throat but no pain. Stopped trazadone and finished course of diflucan. Reports more energy to walk and exercise. Able to tolerate 3 cans of ensure a day. No longer using MMW, says its effects don’t last long. Able to tolerate liquids and soft foods. Taste beginning to come back. Constipation persists. Seeing Dr. BOBBY today for procrit. Sees him weekly per wife. He decided to cancel his trip to [place] this week. \par \par 3/19/19 - FOLLOW UP\par Today Using MMW, Biotene, he reports throat pain still persist, not using gabapentin regularly, encouraged to use as ordered to get relief. His wife says she is now forcing him to use gabapentin and to use MMW regularly. She also made sure he started the fluconazole RX. Tolerating only liquids, soft foods like yoghurt, potatoes mashed, cream of wheat. Assessed by visiting nurse, will start PT and Sp/sw therapy next week. Carries out physical activity as tolerated, walks 4 blocks daily. Constipation is relieved by miralax.\par \par 3/5/19- Pt is here for a followup 3/5/19. Today he states he feels better, tolerates puree foods(warm cereal and soups), 2-3 Ensure shakes and whey protein recommended by Nutritionist. Increased PO fluids now to 2 Liters per day. Reports Painful throat while swallowing. Relieved by MMW and biotene. Denies any other pain. Visit with Dr Blandon today at 1pm, will review bloodwork after. ER Visit from Dr Blandon office on 2/26/19 for very low hemoglobin, received blood products, Iron and IVF. Says he is taking diflucan which will complete today (although we sent in 15 pills 1 week ago - wife will check # of pills at home). \par \par 2/26/19- PTE\par Mr. Fuentes returns for an emergent PTE. Several phone calls took place over the past few weeks after he completed RT regarding poor PO intake. He was Rx'd MMW. He continued to lose weight since completion of treatment. On the night of 2/18/18, he fell in the middle of the night (did not hit his head as per patient's wife) due to dizziness. He declined coming in last week for assessment/ labs; he and his wife reported improved PO intake. His wife Selma emailed over the weekend reporting another fall due to slipping on liquid on the floor. She stated he had increased his fluid intake and was doing quite well until the fall. They also called his PCP Dr. Mckeon to have the Trazodone prescription refilled as he had been having Restless Leg Syndrome most nights. Patient and his wife thought this was the cause of the second fall as he was quite stable in the day but became off balance in the night. He last saw Dr. Blandon on 2/15/19 and has a follow up scheduled for this Friday. \par \par Today, he reports he is eating and drinking better. However, he feels generally weak and also has lightheadedness with position changes. He admits that he did hit his head with both falls, but denies headaches, LOC, confusion. He has lost four pounds since last seen on 2/8, but it is an improvement since last week (he has gained 4 pounds since last week). He is drinking 2 Ensure plus per day. Also eating soft/ pureed foods, soups, broth. He reports sore throat that is improving; was 9/ 10 last week, but now 5/ 10. Using MMW, which is helpful. Also reports xerostomia, using Biotene but only once a day. Patient and wife inquired about Trazodone; he is taking 100 mg daily for restless leg syndrome as per his PCP, but it makes him feel "disoriented." \par \par ______________________________________________\par ONCOLOGIC HISTORY \par Mr. 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 Notably, he has PMH non-Hodgkin's lymphoma (patient unsure of which subtype), diagnosed in 1999, and treated by Dr. Shepherd with CHOP and Fludarabine. \par \par He was followed by Dr. Clifton (Head & Neck) and had complaints of swelling and pain over the left submandibular gland in 2017. A CT scan of the neck done 12/12/17 showed no evidence of pharyngeal mass, asymmetry or laryngeal mass. There were numerous cervical lymph nodes which were larger on the left, as well as enlarged subclavian and axillary lymph nodes. \par \par He continued to have complaints of left neck pain and had occasional metallic taste in mouth in July 2018. He also developed a lisp/ difficulty speaking in July 2018.\par \par He underwent CT neck on 10/15/18, which showed the following:\par IMPRESSION: \par 1. Soft tissue mass involving the left tongue base compatible with a tongue base neoplasm\par 2. Superficial nodule involving the right cheek which may represent an enlarged periparotid lymph node measuring 2.0 x 1.1 cm in axial cross-section, minimally increased in size since the prior study\par 3. Scattered mildly enlarged lymph nodes in the neck again noted, relatively stable since the prior study\par 4. Abnormal soft tissue within the orbits, not significantly changed\par 5. Stable nodular densities within the right upper lung\par 6. Cervical spondylosis with canal and foraminal stenosis at C4-C5. \par \par He underwent a biopsy of the mass on 10/26/18 by Dr. Clifton which showed invasive SCC nonkeratinizing, poorly differentiated, p16 positive, HPV negative. Flow cytometry also done, but results were inconclusive.\par \par PET/CT ([facility])11/12/18\par IMPRESSION: \par 1. Robust lobular hypermetabolic soft tissue mass involving the tongue base consistent with known primary tongue base malignancy. It extends to the level of the hyoid without obvious involvement of the preepiglottic fat. If warranted, consider dedicated MR imaging for further assessment.\par 2. Assessment for metastatic regional lymph nodes from primary tongue malignancy is limited in this patient with evidence of low-grade lymphomatous involvement in the neck. Asymmetrically hypermetabolic but stable appearing lymph nodes in the left level III lymph node would be the most concerning for potential regional ozzy disease from tongue base malignancy.\par 3. Multiple homogeneous lymph nodes associated with low-grade uptake especially in the neck and chest and to a lesser extent the abdomen and pelvis is likely related to patient's history of non-Hodgkin's lymphoma. Associated metabolic activity is consistent with a low-grade viability.\par 4. No definite evidence to suggest focal splenic or marrow involvement of disease. Low-grade diffuse involvement is not excluded.\par 5. Stable appearance of peribronchial nodular foci in the right upper lobe, too small to characterize, but favoring a post inflammatory/infectious process. Other benign type changes are seen in the mid to lower lung fields, which can be reassessed at follow-up imaging.\par 6. No suspicious findings to suggest marginal or regional ozzy recurrence of prostate malignancy or evidence of osteoblastic metastatic disease.\par \par Today, he reports he feels generally well with exception of difficulty swallowing solid foods since the biopsy. He has been eating soups and soft foods. He also reports xerostomia and continues to have a lisp. He is mildly fatigue, but it improves with rest. He continues to work as a . He denies fever, chills, CP, SOB, N/V/D, decreased appetite. He has lost approx 6- 7 pounds since the biopsy on 10/26. Of note, he reports he is claustrophobic, has needed anxiolytics prior to imaging in the past. \par \par His dentist is Dr. Hager ([phone]) and he last saw Dr. Hager in September. \par Family history notable for brother with leukemia. \par \par

## 2019-07-08 NOTE — PHYSICAL EXAM
[Thin] : thin [Normal Oral Cavity] : oral cavity was normal [Heart Rate And Rhythm] : heart rate and rhythm were normal [Heart Sounds] : normal S1 and S2 [Skin Color & Pigmentation] : normal skin color and pigmentation [Normal] : no focal deficits [Oriented To Time, Place, And Person] : oriented to person, place, and time [de-identified] : tongue movement improved significantly left BOT/FOM, no masses, mucositis almost totally improved. No evidence of thrush. Improved appearance to mouth and less odor.  Left hemitongue atrophy. less tender to left FOM near submandibular gland [de-identified] : grade 1 lymphedema

## 2019-07-08 NOTE — DISEASE MANAGEMENT
[Clinical] : TNM Stage: c [III] : III [TTNM] : - [FreeTextEntry4] : IAE MALT lymphoma of the stomach [NTNM] : - [MTNM] : - [de-identified] : 150cGy [de-identified] : stomach [de-identified] : 3000cGy

## 2019-07-08 NOTE — DISEASE MANAGEMENT
[Clinical] : TNM Stage: c [III] : III [TTNM] : - [FreeTextEntry4] : IAE MALT lymphoma of the stomach [NTNM] : - [MTNM] : -

## 2019-07-08 NOTE — HISTORY OF PRESENT ILLNESS
[FreeTextEntry1] : Mr. Nathaniel Fuentes has completed radiation therapy to the oropharynx/ neck for a total of 7000 cGy over 35 fractions from 12/19/18 to 2/8/19. He did not have any unexpected treatment complications during the course of treatment. He received 5 out of 7 cycles of concurrent weekly Cisplatin with Dr. Blandon.  He has had a complicated post-CRT course detailed in prior notes and below.  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  He is now planned to undergo RT to a dose of 3000 cGy to the stomach.  \par \par 7/8/19 - Follow-up - Mr. Fuentes was scheduled to begin RT to the stomach today.  He did not take his prophylactic zofran this morning, so declined treatment today.  He has opted to begin treatment tomorrow.  He and his wife present to clinic today for follow-up of his left base of tongue ulcer.  They state they have been seeing Dr. Clifton recently, and report that she has been happy with the progress of the ulcer.  He continues to have neck pain, which has improved significantly with oxycodone.  They plan to get a second opinion from Dr. Miles regarding his ongoing neck pain.  They also have some questions about his medications.  He points to his pain as bilateral and essentially overlying the hyoid bone.   he is able to swallow.   taste is poot\par \par 6/13/19 - SNA\par Mr. Fuentes returns for SNA.  He was last seen here on 6/3/19 at which time he was found to have a deep BOT ulcer with heaped edges on clinical exam.  PET/CT and CT were also concerning for uptake in BOT.  He was given a course of diflucan, magic mouthwash, and advised to use baking soda and meat tenderizer oral rinses and restart gabapentin and protonix.  He was also to be evaluated by Dr. Clifton for possible biopsy - she has decided to re-evaluate before deciding on biopsy.  \par \par Upper Endoscopy was done at [facility] (Dr. Santamaria) for Melena on 5/22/19; report shows "diffuse severe mucoosal changes characterized by granularity, inflammation, and nodularity were found in the cardia, in the gastric fundus, and gastric body. 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 Pylori and focal granulation tissue and fibrinopurulent debris. [facility] Path review pending.  He saw Dr. Blandon on 6/11/19 and discussed chemo vs. RT.  H/H was 12.0/38.3 on 6/11 (after Procrit).  \par \par He is here today for consideration of radiation therapy.  He states that his right facial pain is unchanged.  He will complete the course of diflucan in 3 days and is using mouth rinses with the exception of magic mouthwash.  He was encouraged to start using it.  He notes that he is "here for another reason" today.  He denies GI complaints including abdominal pain, nausea, vomiting, constipation, diarrhea, melena, hematemesis.  He has not yet followed up with GI for colonoscopy.  \par \par 6/3/19 - Follow-up\par Mr. Fuentes presents today for routine follow-up.  He was last seen here on 4/2/19.  Plan at that time was for PET/CT and CT neck with contrast in 6 weeks, continue weekly visits with Dr. Blandon, continue Ensure 3 cans/day and increase calories, continue gabapentin (300mg AM, 600mg PM), stop trazodone, continue VNS and SLP and exercises.  \par \par PET/CT 5/16/19 - Impression: \par 1. Increased FDG uptake within the root of the tongue.  While some portion of this activity may be related to treatment and subsequent ulceration, the possibility of persistent disease is raised because of the intensity and distribution of the uptake.\par 2.  Scattered subcentimeter mediastinal and para-aortic lymph nodes. \par 3.  FDG uptake diffusely throughout the stomach and may represent infectious versus inflammatory gastritis.  Please note that a similar pattern was seen on the patient's prior PET scan dated 11/12/2018.\par \par CT neck with contrast 5/16/19 - Impression: There is now a large ulcer involving the left floor of mouth that is lined by heterogenously enhancing soft tissue that is FDG avid.  Findings are suspicious for disease persistence along the ulcer walls.  Correlation with visual inspection and possibly tissue sampling is recommended for further characterization.  Presumed lymphoproliferative disease in the orbits and cervical lymph nodes has nearly resolved.  \par \par He last saw Dr. Blandon one week ago.  Today, he reports that he has been "up and down" since we last saw him.  He states that approximately two weeks ago, he was experiencing bloody stool.  His wife called an ambulance and he was taken to the ED at [facility].  He states that he was found to be anemic with hemoglobin of 3 and received 3 blood transfusions.  He  underwent GI workup including upper endoscopy which showed bleeding ulcerations in the stomach; all pathology was benign.  He notes that he had been taking Aleve for pain prior to this episode and has since stopped.  He was discharged home 3 days later.  He was advised to take protonix daily and follow-up with GI.  He states that he has stopped protonix because he has not had any episodes of bleeding.  He did not keep his follow-up appointment with GI as he was not feeling well that day.  His wife states she plans to reschedule.  \par \par He notes continued left neck pain and edema relieved with warm compresses and tylenol.  He states that is has worsened over the past 2-3 weeks.  Of note, he also reports stopping gabapentin around that time because he didn't think it was helping and it is "just another thing."  He reports continued odynophagia which prevents him from eating solid food.  He is still on a liquid diet with ensure.  He states he lost 12 pounds during his recent hospitalization but has started gaining some weight again.  He reports that xerostomia has resolved.  He plans to follow-up with his PMD Dr. Mckeon this week and will also see Dr. Clifton today.  \par \par 4/2/19- FOLLOW UP\par Mr. Fuentes presents for follow up. When he was last seen on 3/19/19, he was volume depleted and continuing to report throat pain. Plan at the time was for labs with Dr. BOBBY, complete course of Diflucan, increase Ensure plus to 3 cans/ day, continue MMW and Gabapentin, stop Trazodone, continue VNS services, and follow up with SLP. \par \par He saw SLP on 3/28/19, at which time he recommended H&N exercises to improve swallow strength. Complains of irritation in throat but no pain. Stopped trazadone and finished course of diflucan. Reports more energy to walk and exercise. Able to tolerate 3 cans of ensure a day. No longer using MMW, says its effects don’t last long. Able to tolerate liquids and soft foods. Taste beginning to come back. Constipation persists. Seeing Dr. BOBBY today for procrit. Sees him weekly per wife. He decided to cancel his trip to [place] this week. \par \par 3/19/19 - FOLLOW UP\par Today Using MMW, Biotene, he reports throat pain still persist, not using gabapentin regularly, encouraged to use as ordered to get relief. His wife says she is now forcing him to use gabapentin and to use MMW regularly. She also made sure he started the fluconazole RX. Tolerating only liquids, soft foods like yoghurt, potatoes mashed, cream of wheat. Assessed by visiting nurse, will start PT and Sp/sw therapy next week. Carries out physical activity as tolerated, walks 4 blocks daily. Constipation is relieved by miralax.\par \par 3/5/19- Pt is here for a followup 3/5/19. Today he states he feels better, tolerates puree foods(warm cereal and soups), 2-3 Ensure shakes and whey protein recommended by Nutritionist. Increased PO fluids now to 2 Liters per day. Reports Painful throat while swallowing. Relieved by MMW and biotene. Denies any other pain. Visit with Dr Blandon today at 1pm, will review bloodwork after. ER Visit from Dr Blandon office on 2/26/19 for very low hemoglobin, received blood products, Iron and IVF. Says he is taking diflucan which will complete today (although we sent in 15 pills 1 week ago - wife will check # of pills at home). \par \par 2/26/19- PTE\par Mr. Fuentes returns for an emergent PTE. Several phone calls took place over the past few weeks after he completed RT regarding poor PO intake. He was Rx'd MMW. He continued to lose weight since completion of treatment. On the night of 2/18/18, he fell in the middle of the night (did not hit his head as per patient's wife) due to dizziness. He declined coming in last week for assessment/ labs; he and his wife reported improved PO intake. His wife Selma emailed over the weekend reporting another fall due to slipping on liquid on the floor. She stated he had increased his fluid intake and was doing quite well until the fall. They also called his PCP Dr. Mckeon to have the Trazodone prescription refilled as he had been having Restless Leg Syndrome most nights. Patient and his wife thought this was the cause of the second fall as he was quite stable in the day but became off balance in the night. He last saw Dr. Blandon on 2/15/19 and has a follow up scheduled for this Friday. \par \par Today, he reports he is eating and drinking better. However, he feels generally weak and also has lightheadedness with position changes. He admits that he did hit his head with both falls, but denies headaches, LOC, confusion. He has lost four pounds since last seen on 2/8, but it is an improvement since last week (he has gained 4 pounds since last week). He is drinking 2 Ensure plus per day. Also eating soft/ pureed foods, soups, broth. He reports sore throat that is improving; was 9/ 10 last week, but now 5/ 10. Using MMW, which is helpful. Also reports xerostomia, using Biotene but only once a day. Patient and wife inquired about Trazodone; he is taking 100 mg daily for restless leg syndrome as per his PCP, but it makes him feel "disoriented." \par \par ______________________________________________\par ONCOLOGIC HISTORY \par Mr. 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 Notably, he has PMH non-Hodgkin's lymphoma (patient unsure of which subtype), diagnosed in 1999, and treated by Dr. Shepherd with CHOP and Fludarabine. \par \par He was followed by Dr. Clifton (Head & Neck) and had complaints of swelling and pain over the left submandibular gland in 2017. A CT scan of the neck done 12/12/17 showed no evidence of pharyngeal mass, asymmetry or laryngeal mass. There were numerous cervical lymph nodes which were larger on the left, as well as enlarged subclavian and axillary lymph nodes. \par \par He continued to have complaints of left neck pain and had occasional metallic taste in mouth in July 2018. He also developed a lisp/ difficulty speaking in July 2018.\par \par He underwent CT neck on 10/15/18, which showed the following:\par IMPRESSION: \par 1. Soft tissue mass involving the left tongue base compatible with a tongue base neoplasm\par 2. Superficial nodule involving the right cheek which may represent an enlarged periparotid lymph node measuring 2.0 x 1.1 cm in axial cross-section, minimally increased in size since the prior study\par 3. Scattered mildly enlarged lymph nodes in the neck again noted, relatively stable since the prior study\par 4. Abnormal soft tissue within the orbits, not significantly changed\par 5. Stable nodular densities within the right upper lung\par 6. Cervical spondylosis with canal and foraminal stenosis at C4-C5. \par \par He underwent a biopsy of the mass on 10/26/18 by Dr. Clifton which showed invasive SCC nonkeratinizing, poorly differentiated, p16 positive, HPV negative. Flow cytometry also done, but results were inconclusive.\par \par PET/CT ([facility])11/12/18\par IMPRESSION: \par 1. Robust lobular hypermetabolic soft tissue mass involving the tongue base consistent with known primary tongue base malignancy. It extends to the level of the hyoid without obvious involvement of the preepiglottic fat. If warranted, consider dedicated MR imaging for further assessment.\par 2. Assessment for metastatic regional lymph nodes from primary tongue malignancy is limited in this patient with evidence of low-grade lymphomatous involvement in the neck. Asymmetrically hypermetabolic but stable appearing lymph nodes in the left level III lymph node would be the most concerning for potential regional ozzy disease from tongue base malignancy.\par 3. Multiple homogeneous lymph nodes associated with low-grade uptake especially in the neck and chest and to a lesser extent the abdomen and pelvis is likely related to patient's history of non-Hodgkin's lymphoma. Associated metabolic activity is consistent with a low-grade viability.\par 4. No definite evidence to suggest focal splenic or marrow involvement of disease. Low-grade diffuse involvement is not excluded.\par 5. Stable appearance of peribronchial nodular foci in the right upper lobe, too small to characterize, but favoring a post inflammatory/infectious process. Other benign type changes are seen in the mid to lower lung fields, which can be reassessed at follow-up imaging.\par 6. No suspicious findings to suggest marginal or regional ozzy recurrence of prostate malignancy or evidence of osteoblastic metastatic disease.\par \par Today, he reports he feels generally well with exception of difficulty swallowing solid foods since the biopsy. He has been eating soups and soft foods. He also reports xerostomia and continues to have a lisp. He is mildly fatigue, but it improves with rest. He continues to work as a . He denies fever, chills, CP, SOB, N/V/D, decreased appetite. He has lost approx 6- 7 pounds since the biopsy on 10/26. Of note, he reports he is claustrophobic, has needed anxiolytics prior to imaging in the past. \par \par His dentist is Dr. Hager ([phone]) and he last saw Dr. Hager in September. \par Family history notable for brother with leukemia. \par \par

## 2019-07-10 VITALS — WEIGHT: 143 LBS | BODY MASS INDEX: 20.52 KG/M2

## 2019-07-11 NOTE — PHYSICAL EXAM
[Thin] : thin [Normal Oral Cavity] : oral cavity was normal [Heart Rate And Rhythm] : heart rate and rhythm were normal [Heart Sounds] : normal S1 and S2 [Skin Color & Pigmentation] : normal skin color and pigmentation [Normal] : no focal deficits [Oriented To Time, Place, And Person] : oriented to person, place, and time

## 2019-07-15 VITALS
HEART RATE: 90 BPM | RESPIRATION RATE: 16 BRPM | SYSTOLIC BLOOD PRESSURE: 104 MMHG | DIASTOLIC BLOOD PRESSURE: 70 MMHG | OXYGEN SATURATION: 96 %

## 2019-07-15 NOTE — REVIEW OF SYSTEMS
[Fatigue: Grade 1 - Fatigue relieved by rest] : Fatigue: Grade 1 - Fatigue relieved by rest [Mucositis Oral: Grade 1 - Asymptomatic or mild symptoms; intervention not indicated] : Mucositis Oral: Grade 1 - Asymptomatic or mild symptoms; intervention not indicated [Pharyngeal Mucositis: Grade 1 - Endoscopic findings only; minimal symptoms with normal oral intake; mild pain but analgesics not indicated] : Pharyngeal Mucositis: Grade 1 - Endoscopic findings only; minimal symptoms with normal oral intake; mild pain but analgesics not indicated [Negative] : Neurological [FreeTextEntry4] : see HPI

## 2019-07-15 NOTE — DISEASE MANAGEMENT
[Clinical] : TNM Stage: c [III] : III [FreeTextEntry4] : IAE MALT lymphoma of the stomach [TTNM] : - [NTNM] : - [MTNM] : - [de-identified] : 700 cGy [de-identified] : 3000 cGy [de-identified] : stomach

## 2019-07-15 NOTE — HISTORY OF PRESENT ILLNESS
[FreeTextEntry1] : Mr. Nathaniel Fuentes has completed radiation therapy to the oropharynx/ neck for a total of 7000 cGy over 35 fractions from 12/19/18 to 2/8/19. He did not have any unexpected treatment complications during the course of treatment. He received 5 out of 7 cycles of concurrent weekly Cisplatin with Dr. Blandon.  He has had a complicated post-CRT course detailed in prior notes and below.  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  He is now planned to undergo RT to a dose of 3000 cGy to the stomach.  \par \par 7/15/19 - OTV - Mr. Fuentes has completed 300 cGy / 3000 cGy to the stomach.  He was admitted to [facility] on Thursday for bleeding from his mouth.  He states that he spit up approximately 1 cup of blood and his wife took him to the ED.  He states he underwent a CT scan, CT angio and several laryngoscopies.  He was told there is concern for recurrence of his base of tongue cancer.  He saw Dr. Phan Jackman at [facility] in follow-up on Friday and is scheduled to see Dr. Miles for second opinion tomorrow.  He states he is emotionally overwhelmed and exhausted.  He denies any further episodes of bleeding as well as GI complaints to include nausea, vomiting, constipation and diarrhea.  He did not take his prophylactic zofran today, and was therefore given a dose of 8mg in clinic today.  He will receive treatment following this visit. \par \par \par \par \par 7/8/19 - Follow-up - Mr. Fuentes was scheduled to begin RT to the stomach today.  He did not take his prophylactic zofran this morning, so declined treatment today.  He has opted to begin treatment tomorrow.  He and his wife present to clinic today for follow-up of his left base of tongue ulcer.  They state they have been seeing Dr. Clifton recently, and report that she has been happy with the progress of the ulcer.  He continues to have neck pain, which has improved significantly with oxycodone.  They plan to get a second opinion from Dr. Miles regarding his ongoing neck pain.  They also have some questions about his medications.  He points to his pain as bilateral and essentially overlying the hyoid bone.   he is able to swallow.   taste is poot\par \par 6/13/19 - SNA\par Mr. Fuentes returns for SNA.  He was last seen here on 6/3/19 at which time he was found to have a deep BOT ulcer with heaped edges on clinical exam.  PET/CT and CT were also concerning for uptake in BOT.  He was given a course of diflucan, magic mouthwash, and advised to use baking soda and meat tenderizer oral rinses and restart gabapentin and protonix.  He was also to be evaluated by Dr. Clifton for possible biopsy - she has decided to re-evaluate before deciding on biopsy.  \par \par Upper Endoscopy was done at [facility] (Dr. Santamaria) for Melena on 5/22/19; report shows "diffuse severe mucoosal changes characterized by granularity, inflammation, and nodularity were found in the cardia, in the gastric fundus, and gastric body. 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 Pylori and focal granulation tissue and fibrinopurulent debris. [facility] Path review pending.  He saw Dr. Blandon on 6/11/19 and discussed chemo vs. RT.  H/H was 12.0/38.3 on 6/11 (after Procrit).  \par \par He is here today for consideration of radiation therapy.  He states that his right facial pain is unchanged.  He will complete the course of diflucan in 3 days and is using mouth rinses with the exception of magic mouthwash.  He was encouraged to start using it.  He notes that he is "here for another reason" today.  He denies GI complaints including abdominal pain, nausea, vomiting, constipation, diarrhea, melena, hematemesis.  He has not yet followed up with GI for colonoscopy.  \par \par 6/3/19 - Follow-up\claude Fuentes presents today for routine follow-up.  He was last seen here on 4/2/19.  Plan at that time was for PET/CT and CT neck with contrast in 6 weeks, continue weekly visits with Dr. Blandon, continue Ensure 3 cans/day and increase calories, continue gabapentin (300mg AM, 600mg PM), stop trazodone, continue VNS and SLP and exercises.  \par \par PET/CT 5/16/19 - Impression: \par 1. Increased FDG uptake within the root of the tongue.  While some portion of this activity may be related to treatment and subsequent ulceration, the possibility of persistent disease is raised because of the intensity and distribution of the uptake.\par 2.  Scattered subcentimeter mediastinal and para-aortic lymph nodes. \par 3.  FDG uptake diffusely throughout the stomach and may represent infectious versus inflammatory gastritis.  Please note that a similar pattern was seen on the patient's prior PET scan dated 11/12/2018.\par \par CT neck with contrast 5/16/19 - Impression: There is now a large ulcer involving the left floor of mouth that is lined by heterogenously enhancing soft tissue that is FDG avid.  Findings are suspicious for disease persistence along the ulcer walls.  Correlation with visual inspection and possibly tissue sampling is recommended for further characterization.  Presumed lymphoproliferative disease in the orbits and cervical lymph nodes has nearly resolved.  \par \par He last saw Dr. Blandon one week ago.  Today, he reports that he has been "up and down" since we last saw him.  He states that approximately two weeks ago, he was experiencing bloody stool.  His wife called an ambulance and he was taken to the ED at [facility].  He states that he was found to be anemic with hemoglobin of 3 and received 3 blood transfusions.  He  underwent GI workup including upper endoscopy which showed bleeding ulcerations in the stomach; all pathology was benign.  He notes that he had been taking Aleve for pain prior to this episode and has since stopped.  He was discharged home 3 days later.  He was advised to take protonix daily and follow-up with GI.  He states that he has stopped protonix because he has not had any episodes of bleeding.  He did not keep his follow-up appointment with GI as he was not feeling well that day.  His wife states she plans to reschedule.  \par \par He notes continued left neck pain and edema relieved with warm compresses and tylenol.  He states that is has worsened over the past 2-3 weeks.  Of note, he also reports stopping gabapentin around that time because he didn't think it was helping and it is "just another thing."  He reports continued odynophagia which prevents him from eating solid food.  He is still on a liquid diet with ensure.  He states he lost 12 pounds during his recent hospitalization but has started gaining some weight again.  He reports that xerostomia has resolved.  He plans to follow-up with his PMD Dr. Mckeon this week and will also see Dr. Clifton today.  \par \par 4/2/19- FOLLOW UP\par Mr. Fuentes presents for follow up. When he was last seen on 3/19/19, he was volume depleted and continuing to report throat pain. Plan at the time was for labs with Dr. BOBBY, complete course of Diflucan, increase Ensure plus to 3 cans/ day, continue MMW and Gabapentin, stop Trazodone, continue VNS services, and follow up with SLP. \par \par He saw SLP on 3/28/19, at which time he recommended H&N exercises to improve swallow strength. Complains of irritation in throat but no pain. Stopped trazadone and finished course of diflucan. Reports more energy to walk and exercise. Able to tolerate 3 cans of ensure a day. No longer using MMW, says its effects don’t last long. Able to tolerate liquids and soft foods. Taste beginning to come back. Constipation persists. Seeing Dr. BOBBY today for procrit. Sees him weekly per wife. He decided to cancel his trip to [place] this week. \par \par 3/19/19 - FOLLOW UP\par Today Using MMW, Biotene, he reports throat pain still persist, not using gabapentin regularly, encouraged to use as ordered to get relief. His wife says she is now forcing him to use gabapentin and to use MMW regularly. She also made sure he started the fluconazole RX. Tolerating only liquids, soft foods like yoghurt, potatoes mashed, cream of wheat. Assessed by visiting nurse, will start PT and Sp/sw therapy next week. Carries out physical activity as tolerated, walks 4 blocks daily. Constipation is relieved by miralax.\par \par 3/5/19- Pt is here for a followup 3/5/19. Today he states he feels better, tolerates puree foods(warm cereal and soups), 2-3 Ensure shakes and whey protein recommended by Nutritionist. Increased PO fluids now to 2 Liters per day. Reports Painful throat while swallowing. Relieved by MMW and biotene. Denies any other pain. Visit with Dr Blandon today at 1pm, will review bloodwork after. ER Visit from Dr Blandon office on 2/26/19 for very low hemoglobin, received blood products, Iron and IVF. Says he is taking diflucan which will complete today (although we sent in 15 pills 1 week ago - wife will check # of pills at home). \par \par 2/26/19- PTE\par Mr. Fuentes returns for an emergent PTE. Several phone calls took place over the past few weeks after he completed RT regarding poor PO intake. He was Rx'd MMW. He continued to lose weight since completion of treatment. On the night of 2/18/18, he fell in the middle of the night (did not hit his head as per patient's wife) due to dizziness. He declined coming in last week for assessment/ labs; he and his wife reported improved PO intake. His wife Selma emailed over the weekend reporting another fall due to slipping on liquid on the floor. She stated he had increased his fluid intake and was doing quite well until the fall. They also called his PCP Dr. Mckeon to have the Trazodone prescription refilled as he had been having Restless Leg Syndrome most nights. Patient and his wife thought this was the cause of the second fall as he was quite stable in the day but became off balance in the night. He last saw Dr. Blandon on 2/15/19 and has a follow up scheduled for this Friday. \par \par Today, he reports he is eating and drinking better. However, he feels generally weak and also has lightheadedness with position changes. He admits that he did hit his head with both falls, but denies headaches, LOC, confusion. He has lost four pounds since last seen on 2/8, but it is an improvement since last week (he has gained 4 pounds since last week). He is drinking 2 Ensure plus per day. Also eating soft/ pureed foods, soups, broth. He reports sore throat that is improving; was 9/ 10 last week, but now 5/ 10. Using MMW, which is helpful. Also reports xerostomia, using Biotene but only once a day. Patient and wife inquired about Trazodone; he is taking 100 mg daily for restless leg syndrome as per his PCP, but it makes him feel "disoriented." \par \par ______________________________________________\par ONCOLOGIC HISTORY \par Mr. 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 Notably, he has PMH non-Hodgkin's lymphoma (patient unsure of which subtype), diagnosed in 1999, and treated by Dr. Shepherd with CHOP and Fludarabine. \par \par He was followed by Dr. Clifton (Head & Neck) and had complaints of swelling and pain over the left submandibular gland in 2017. A CT scan of the neck done 12/12/17 showed no evidence of pharyngeal mass, asymmetry or laryngeal mass. There were numerous cervical lymph nodes which were larger on the left, as well as enlarged subclavian and axillary lymph nodes. \par \par He continued to have complaints of left neck pain and had occasional metallic taste in mouth in July 2018. He also developed a lisp/ difficulty speaking in July 2018.\par \par He underwent CT neck on 10/15/18, which showed the following:\par IMPRESSION: \par 1. Soft tissue mass involving the left tongue base compatible with a tongue base neoplasm\par 2. Superficial nodule involving the right cheek which may represent an enlarged periparotid lymph node measuring 2.0 x 1.1 cm in axial cross-section, minimally increased in size since the prior study\par 3. Scattered mildly enlarged lymph nodes in the neck again noted, relatively stable since the prior study\par 4. Abnormal soft tissue within the orbits, not significantly changed\par 5. Stable nodular densities within the right upper lung\par 6. Cervical spondylosis with canal and foraminal stenosis at C4-C5. \par \par He underwent a biopsy of the mass on 10/26/18 by Dr. Clifton which showed invasive SCC nonkeratinizing, poorly differentiated, p16 positive, HPV negative. Flow cytometry also done, but results were inconclusive.\par \par PET/CT ([facility])11/12/18\par IMPRESSION: \par 1. Robust lobular hypermetabolic soft tissue mass involving the tongue base consistent with known primary tongue base malignancy. It extends to the level of the hyoid without obvious involvement of the preepiglottic fat. If warranted, consider dedicated MR imaging for further assessment.\par 2. Assessment for metastatic regional lymph nodes from primary tongue malignancy is limited in this patient with evidence of low-grade lymphomatous involvement in the neck. Asymmetrically hypermetabolic but stable appearing lymph nodes in the left level III lymph node would be the most concerning for potential regional ozzy disease from tongue base malignancy.\par 3. Multiple homogeneous lymph nodes associated with low-grade uptake especially in the neck and chest and to a lesser extent the abdomen and pelvis is likely related to patient's history of non-Hodgkin's lymphoma. Associated metabolic activity is consistent with a low-grade viability.\par 4. No definite evidence to suggest focal splenic or marrow involvement of disease. Low-grade diffuse involvement is not excluded.\par 5. Stable appearance of peribronchial nodular foci in the right upper lobe, too small to characterize, but favoring a post inflammatory/infectious process. Other benign type changes are seen in the mid to lower lung fields, which can be reassessed at follow-up imaging.\par 6. No suspicious findings to suggest marginal or regional ozzy recurrence of prostate malignancy or evidence of osteoblastic metastatic disease.\par \par Today, he reports he feels generally well with exception of difficulty swallowing solid foods since the biopsy. He has been eating soups and soft foods. He also reports xerostomia and continues to have a lisp. He is mildly fatigue, but it improves with rest. He continues to work as a . He denies fever, chills, CP, SOB, N/V/D, decreased appetite. He has lost approx 6- 7 pounds since the biopsy on 10/26. Of note, he reports he is claustrophobic, has needed anxiolytics prior to imaging in the past. \par \par His dentist is Dr. Hager ([phone]) and he last saw Dr. Hagre in September. \par Family history notable for brother with leukemia. \par \par

## 2019-07-16 ENCOUNTER — APPOINTMENT (OUTPATIENT)
Dept: OTOLARYNGOLOGY | Facility: CLINIC | Age: 79
End: 2019-07-16
Payer: MEDICARE

## 2019-07-16 DIAGNOSIS — R63.4 ABNORMAL WEIGHT LOSS: ICD-10-CM

## 2019-07-16 DIAGNOSIS — Z87.891 PERSONAL HISTORY OF NICOTINE DEPENDENCE: ICD-10-CM

## 2019-07-16 PROCEDURE — 31575 DIAGNOSTIC LARYNGOSCOPY: CPT

## 2019-07-16 PROCEDURE — 99204 OFFICE O/P NEW MOD 45 MIN: CPT | Mod: 25

## 2019-07-22 VITALS
OXYGEN SATURATION: 16 % | DIASTOLIC BLOOD PRESSURE: 72 MMHG | HEART RATE: 118 BPM | SYSTOLIC BLOOD PRESSURE: 107 MMHG | RESPIRATION RATE: 16 BRPM

## 2019-07-22 NOTE — ASU PATIENT PROFILE, ADULT - PSH
History of appendectomy    History of cholecystectomy    S/P unilateral inguinal hernia repair History of appendectomy    History of cholecystectomy    S/P unilateral inguinal hernia repair    Surgery, elective  skin cancer removal right arm

## 2019-07-22 NOTE — PHYSICAL EXAM
[Thin] : thin [Normal Oral Cavity] : oral cavity was normal [Heart Sounds] : normal S1 and S2 [Skin Color & Pigmentation] : normal skin color and pigmentation [Normal] : no focal deficits [Oriented To Time, Place, And Person] : oriented to person, place, and time [de-identified] : tachycardic, regular; denies chest pain and palpitations

## 2019-07-22 NOTE — HISTORY OF PRESENT ILLNESS
[FreeTextEntry1] : Mr. Nathaniel Fuentes has completed radiation therapy to the oropharynx/ neck for a total of 7000 cGy over 35 fractions from 12/19/18 to 2/8/19. He did not have any unexpected treatment complications during the course of treatment. He received 5 out of 7 cycles of concurrent weekly Cisplatin with Dr. Blandon.  He has had a complicated post-CRT course detailed in prior notes and below.  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  He is now planned to undergo RT to a dose of 3000 cGy to the stomach.  \par \par 7/22/19 - OTV - Mr. Fuentes has completed 1050 cGy / 3000 cGy to the stomach.  Today, he denies complaints.  He denies abdominal pain, nausea, vomiting, diarrhea, melena.  He notes chronic constipation, unchanged.  He states he is scheduled for laryngoscopy and biopsy with Dr. Miles tomorrow.  \par \par 7/15/19 - OTV - Mr. Fuentes has completed 300 cGy / 3000 cGy to the stomach.  He was admitted to [facility] on Thursday for bleeding from his mouth.  He states that he spit up approximately 1 cup of blood and his wife took him to the ED.  He states he underwent a CT scan, CT angio and several laryngoscopies.  He was told there is concern for recurrence of his base of tongue cancer.  He saw Dr. Phan Jackman at [facility] in follow-up on Friday and is scheduled to see Dr. Miles for second opinion tomorrow.  He states he is emotionally overwhelmed and exhausted.  He denies any further episodes of bleeding as well as GI complaints to include nausea, vomiting, constipation and diarrhea.  He did not take his prophylactic zofran today, and was therefore given a dose of 8mg in clinic today.  He will receive treatment following this visit. \par \par \par \par \par 7/8/19 - Follow-up - Mr. Fuentes was scheduled to begin RT to the stomach today.  He did not take his prophylactic zofran this morning, so declined treatment today.  He has opted to begin treatment tomorrow.  He and his wife present to clinic today for follow-up of his left base of tongue ulcer.  They state they have been seeing Dr. Clifton recently, and report that she has been happy with the progress of the ulcer.  He continues to have neck pain, which has improved significantly with oxycodone.  They plan to get a second opinion from Dr. Miles regarding his ongoing neck pain.  They also have some questions about his medications.  He points to his pain as bilateral and essentially overlying the hyoid bone.   he is able to swallow.   taste is poot\par \par 6/13/19 - SNA\par Mr. Fuentes returns for SNA.  He was last seen here on 6/3/19 at which time he was found to have a deep BOT ulcer with heaped edges on clinical exam.  PET/CT and CT were also concerning for uptake in BOT.  He was given a course of diflucan, magic mouthwash, and advised to use baking soda and meat tenderizer oral rinses and restart gabapentin and protonix.  He was also to be evaluated by Dr. Clifton for possible biopsy - she has decided to re-evaluate before deciding on biopsy.  \par \par Upper Endoscopy was done at [facility] (Dr. Santamaria) for Melena on 5/22/19; report shows "diffuse severe mucoosal changes characterized by granularity, inflammation, and nodularity were found in the cardia, in the gastric fundus, and gastric body. 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 Pylori and focal granulation tissue and fibrinopurulent debris. [facility] Path review pending.  He saw Dr. Blandon on 6/11/19 and discussed chemo vs. RT.  H/H was 12.0/38.3 on 6/11 (after Procrit).  \par \par He is here today for consideration of radiation therapy.  He states that his right facial pain is unchanged.  He will complete the course of diflucan in 3 days and is using mouth rinses with the exception of magic mouthwash.  He was encouraged to start using it.  He notes that he is "here for another reason" today.  He denies GI complaints including abdominal pain, nausea, vomiting, constipation, diarrhea, melena, hematemesis.  He has not yet followed up with GI for colonoscopy.  \par \par 6/3/19 - Follow-up\par Mr. Fuentes presents today for routine follow-up.  He was last seen here on 4/2/19.  Plan at that time was for PET/CT and CT neck with contrast in 6 weeks, continue weekly visits with Dr. Blandon, continue Ensure 3 cans/day and increase calories, continue gabapentin (300mg AM, 600mg PM), stop trazodone, continue VNS and SLP and exercises.  \par \par PET/CT 5/16/19 - Impression: \par 1. Increased FDG uptake within the root of the tongue.  While some portion of this activity may be related to treatment and subsequent ulceration, the possibility of persistent disease is raised because of the intensity and distribution of the uptake.\par 2.  Scattered subcentimeter mediastinal and para-aortic lymph nodes. \par 3.  FDG uptake diffusely throughout the stomach and may represent infectious versus inflammatory gastritis.  Please note that a similar pattern was seen on the patient's prior PET scan dated 11/12/2018.\par \par CT neck with contrast 5/16/19 - Impression: There is now a large ulcer involving the left floor of mouth that is lined by heterogenously enhancing soft tissue that is FDG avid.  Findings are suspicious for disease persistence along the ulcer walls.  Correlation with visual inspection and possibly tissue sampling is recommended for further characterization.  Presumed lymphoproliferative disease in the orbits and cervical lymph nodes has nearly resolved.  \par \par He last saw Dr. Blandon one week ago.  Today, he reports that he has been "up and down" since we last saw him.  He states that approximately two weeks ago, he was experiencing bloody stool.  His wife called an ambulance and he was taken to the ED at [facility].  He states that he was found to be anemic with hemoglobin of 3 and received 3 blood transfusions.  He  underwent GI workup including upper endoscopy which showed bleeding ulcerations in the stomach; all pathology was benign.  He notes that he had been taking Aleve for pain prior to this episode and has since stopped.  He was discharged home 3 days later.  He was advised to take protonix daily and follow-up with GI.  He states that he has stopped protonix because he has not had any episodes of bleeding.  He did not keep his follow-up appointment with GI as he was not feeling well that day.  His wife states she plans to reschedule.  \par \par He notes continued left neck pain and edema relieved with warm compresses and tylenol.  He states that is has worsened over the past 2-3 weeks.  Of note, he also reports stopping gabapentin around that time because he didn't think it was helping and it is "just another thing."  He reports continued odynophagia which prevents him from eating solid food.  He is still on a liquid diet with ensure.  He states he lost 12 pounds during his recent hospitalization but has started gaining some weight again.  He reports that xerostomia has resolved.  He plans to follow-up with his PMD Dr. Mckeon this week and will also see Dr. Clifton today.  \par \par 4/2/19- FOLLOW UP\par Mr. Fuentes presents for follow up. When he was last seen on 3/19/19, he was volume depleted and continuing to report throat pain. Plan at the time was for labs with Dr. BOBBY, complete course of Diflucan, increase Ensure plus to 3 cans/ day, continue MMW and Gabapentin, stop Trazodone, continue VNS services, and follow up with SLP. \par \par He saw SLP on 3/28/19, at which time he recommended H&N exercises to improve swallow strength. Complains of irritation in throat but no pain. Stopped trazadone and finished course of diflucan. Reports more energy to walk and exercise. Able to tolerate 3 cans of ensure a day. No longer using MMW, says its effects don’t last long. Able to tolerate liquids and soft foods. Taste beginning to come back. Constipation persists. Seeing Dr. BOBBY today for maniriestiven. Sees him weekly per wife. He decided to cancel his trip to [place] this week. \par \par 3/19/19 - FOLLOW UP\par Today Using MMW, Biotene, he reports throat pain still persist, not using gabapentin regularly, encouraged to use as ordered to get relief. His wife says she is now forcing him to use gabapentin and to use MMW regularly. She also made sure he started the fluconazole RX. Tolerating only liquids, soft foods like yoghurt, potatoes mashed, cream of wheat. Assessed by visiting nurse, will start PT and Sp/sw therapy next week. Carries out physical activity as tolerated, walks 4 blocks daily. Constipation is relieved by miralax.\par \par 3/5/19- Pt is here for a followup 3/5/19. Today he states he feels better, tolerates puree foods(warm cereal and soups), 2-3 Ensure shakes and whey protein recommended by Nutritionist. Increased PO fluids now to 2 Liters per day. Reports Painful throat while swallowing. Relieved by MMW and biotene. Denies any other pain. Visit with Dr Blandon today at 1pm, will review bloodwork after. ER Visit from Dr Blandon office on 2/26/19 for very low hemoglobin, received blood products, Iron and IVF. Says he is taking diflucan which will complete today (although we sent in 15 pills 1 week ago - wife will check # of pills at home). \par \par 2/26/19- PTE\par Mr. Fuentes returns for an emergent PTE. Several phone calls took place over the past few weeks after he completed RT regarding poor PO intake. He was Rx'd MMW. He continued to lose weight since completion of treatment. On the night of 2/18/18, he fell in the middle of the night (did not hit his head as per patient's wife) due to dizziness. He declined coming in last week for assessment/ labs; he and his wife reported improved PO intake. His wife Selma emailed over the weekend reporting another fall due to slipping on liquid on the floor. She stated he had increased his fluid intake and was doing quite well until the fall. They also called his PCP Dr. Mckeon to have the Trazodone prescription refilled as he had been having Restless Leg Syndrome most nights. Patient and his wife thought this was the cause of the second fall as he was quite stable in the day but became off balance in the night. He last saw Dr. Blandon on 2/15/19 and has a follow up scheduled for this Friday. \par \par Today, he reports he is eating and drinking better. However, he feels generally weak and also has lightheadedness with position changes. He admits that he did hit his head with both falls, but denies headaches, LOC, confusion. He has lost four pounds since last seen on 2/8, but it is an improvement since last week (he has gained 4 pounds since last week). He is drinking 2 Ensure plus per day. Also eating soft/ pureed foods, soups, broth. He reports sore throat that is improving; was 9/ 10 last week, but now 5/ 10. Using MMW, which is helpful. Also reports xerostomia, using Biotene but only once a day. Patient and wife inquired about Trazodone; he is taking 100 mg daily for restless leg syndrome as per his PCP, but it makes him feel "disoriented." \par \par ______________________________________________\par ONCOLOGIC HISTORY \par Mr. 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 Notably, he has PMH non-Hodgkin's lymphoma (patient unsure of which subtype), diagnosed in 1999, and treated by Dr. Shepherd with CHOP and Fludarabine. \par \par He was followed by Dr. Clifton (Head & Neck) and had complaints of swelling and pain over the left submandibular gland in 2017. A CT scan of the neck done 12/12/17 showed no evidence of pharyngeal mass, asymmetry or laryngeal mass. There were numerous cervical lymph nodes which were larger on the left, as well as enlarged subclavian and axillary lymph nodes. \par \par He continued to have complaints of left neck pain and had occasional metallic taste in mouth in July 2018. He also developed a lisp/ difficulty speaking in July 2018.\par \par He underwent CT neck on 10/15/18, which showed the following:\par IMPRESSION: \par 1. Soft tissue mass involving the left tongue base compatible with a tongue base neoplasm\par 2. Superficial nodule involving the right cheek which may represent an enlarged periparotid lymph node measuring 2.0 x 1.1 cm in axial cross-section, minimally increased in size since the prior study\par 3. Scattered mildly enlarged lymph nodes in the neck again noted, relatively stable since the prior study\par 4. Abnormal soft tissue within the orbits, not significantly changed\par 5. Stable nodular densities within the right upper lung\par 6. Cervical spondylosis with canal and foraminal stenosis at C4-C5. \par \par He underwent a biopsy of the mass on 10/26/18 by Dr. Clifton which showed invasive SCC nonkeratinizing, poorly differentiated, p16 positive, HPV negative. Flow cytometry also done, but results were inconclusive.\par \par PET/CT ([facility])11/12/18\par IMPRESSION: \par 1. Robust lobular hypermetabolic soft tissue mass involving the tongue base consistent with known primary tongue base malignancy. It extends to the level of the hyoid without obvious involvement of the preepiglottic fat. If warranted, consider dedicated MR imaging for further assessment.\par 2. Assessment for metastatic regional lymph nodes from primary tongue malignancy is limited in this patient with evidence of low-grade lymphomatous involvement in the neck. Asymmetrically hypermetabolic but stable appearing lymph nodes in the left level III lymph node would be the most concerning for potential regional ozzy disease from tongue base malignancy.\par 3. Multiple homogeneous lymph nodes associated with low-grade uptake especially in the neck and chest and to a lesser extent the abdomen and pelvis is likely related to patient's history of non-Hodgkin's lymphoma. Associated metabolic activity is consistent with a low-grade viability.\par 4. No definite evidence to suggest focal splenic or marrow involvement of disease. Low-grade diffuse involvement is not excluded.\par 5. Stable appearance of peribronchial nodular foci in the right upper lobe, too small to characterize, but favoring a post inflammatory/infectious process. Other benign type changes are seen in the mid to lower lung fields, which can be reassessed at follow-up imaging.\par 6. No suspicious findings to suggest marginal or regional ozzy recurrence of prostate malignancy or evidence of osteoblastic metastatic disease.\par \par Today, he reports he feels generally well with exception of difficulty swallowing solid foods since the biopsy. He has been eating soups and soft foods. He also reports xerostomia and continues to have a lisp. He is mildly fatigue, but it improves with rest. He continues to work as a . He denies fever, chills, CP, SOB, N/V/D, decreased appetite. He has lost approx 6- 7 pounds since the biopsy on 10/26. Of note, he reports he is claustrophobic, has needed anxiolytics prior to imaging in the past. \par \par His dentist is Dr. Hager ([phone]) and he last saw Dr. Hager in September. \par Family history notable for brother with leukemia. \par \par

## 2019-07-22 NOTE — ASU PATIENT PROFILE, ADULT - PMH
Non-Hodgkin's lymphoma, unspecified body region, unspecified non-Hodgkin lymphoma type    Squamous cell carcinoma of tongue Non-Hodgkin's lymphoma, unspecified body region, unspecified non-Hodgkin lymphoma type  treated with chemo  Squamous cell carcinoma of tongue

## 2019-07-22 NOTE — DISEASE MANAGEMENT
[Clinical] : TNM Stage: c [III] : III [FreeTextEntry4] : IAE MALT lymphoma of the stomach [TTNM] : - [NTNM] : - [MTNM] : - [de-identified] :  cGy [de-identified] : 3000 cGy [de-identified] : stomach

## 2019-07-23 ENCOUNTER — APPOINTMENT (OUTPATIENT)
Dept: OTOLARYNGOLOGY | Facility: HOSPITAL | Age: 79
End: 2019-07-23

## 2019-07-23 ENCOUNTER — RESULT REVIEW (OUTPATIENT)
Age: 79
End: 2019-07-23

## 2019-07-23 ENCOUNTER — OUTPATIENT (OUTPATIENT)
Dept: OUTPATIENT SERVICES | Facility: HOSPITAL | Age: 79
LOS: 1 days | Discharge: HOME | End: 2019-07-23
Payer: MEDICARE

## 2019-07-23 VITALS
SYSTOLIC BLOOD PRESSURE: 109 MMHG | DIASTOLIC BLOOD PRESSURE: 62 MMHG | RESPIRATION RATE: 17 BRPM | TEMPERATURE: 98 F | OXYGEN SATURATION: 100 % | HEART RATE: 79 BPM

## 2019-07-23 VITALS
HEART RATE: 116 BPM | DIASTOLIC BLOOD PRESSURE: 56 MMHG | SYSTOLIC BLOOD PRESSURE: 96 MMHG | OXYGEN SATURATION: 99 % | HEIGHT: 70 IN | WEIGHT: 134.26 LBS | RESPIRATION RATE: 18 BRPM | TEMPERATURE: 97 F

## 2019-07-23 DIAGNOSIS — Z90.49 ACQUIRED ABSENCE OF OTHER SPECIFIED PARTS OF DIGESTIVE TRACT: Chronic | ICD-10-CM

## 2019-07-23 DIAGNOSIS — Z98.890 OTHER SPECIFIED POSTPROCEDURAL STATES: Chronic | ICD-10-CM

## 2019-07-23 DIAGNOSIS — Z41.9 ENCOUNTER FOR PROCEDURE FOR PURPOSES OTHER THAN REMEDYING HEALTH STATE, UNSPECIFIED: Chronic | ICD-10-CM

## 2019-07-23 PROCEDURE — 31535 LARYNGOSCOPY W/BIOPSY: CPT

## 2019-07-23 PROCEDURE — 88321 CONSLTJ&REPRT SLD PREP ELSWR: CPT

## 2019-07-23 PROCEDURE — 88305 TISSUE EXAM BY PATHOLOGIST: CPT

## 2019-07-23 PROCEDURE — 88331 PATH CONSLTJ SURG 1 BLK 1SPC: CPT

## 2019-07-23 PROCEDURE — 43200 ESOPHAGOSCOPY FLEXIBLE BRUSH: CPT

## 2019-07-23 RX ORDER — SODIUM CHLORIDE 9 MG/ML
1000 INJECTION, SOLUTION INTRAVENOUS
Refills: 0 | Status: DISCONTINUED | OUTPATIENT
Start: 2019-07-23 | End: 2019-07-23

## 2019-07-23 RX ORDER — OXYCODONE AND ACETAMINOPHEN 5; 325 MG/1; MG/1
1 TABLET ORAL EVERY 4 HOURS
Refills: 0 | Status: DISCONTINUED | OUTPATIENT
Start: 2019-07-23 | End: 2019-07-23

## 2019-07-23 RX ORDER — ACETAMINOPHEN 500 MG
650 TABLET ORAL EVERY 6 HOURS
Refills: 0 | Status: DISCONTINUED | OUTPATIENT
Start: 2019-07-23 | End: 2019-07-23

## 2019-07-23 RX ORDER — ONDANSETRON 8 MG/1
4 TABLET, FILM COATED ORAL EVERY 6 HOURS
Refills: 0 | Status: DISCONTINUED | OUTPATIENT
Start: 2019-07-23 | End: 2019-07-23

## 2019-07-23 RX ORDER — MORPHINE SULFATE 50 MG/1
2 CAPSULE, EXTENDED RELEASE ORAL
Refills: 0 | Status: DISCONTINUED | OUTPATIENT
Start: 2019-07-23 | End: 2019-07-23

## 2019-07-23 RX ORDER — ACETAMINOPHEN WITH CODEINE 300MG-30MG
1 TABLET ORAL
Qty: 12 | Refills: 0
Start: 2019-07-23 | End: 2019-07-26

## 2019-07-23 RX ORDER — OXYCODONE AND ACETAMINOPHEN 5; 325 MG/1; MG/1
2 TABLET ORAL EVERY 6 HOURS
Refills: 0 | Status: DISCONTINUED | OUTPATIENT
Start: 2019-07-23 | End: 2019-07-23

## 2019-07-23 RX ADMIN — OXYCODONE AND ACETAMINOPHEN 1 TABLET(S): 5; 325 TABLET ORAL at 16:38

## 2019-07-23 RX ADMIN — OXYCODONE AND ACETAMINOPHEN 1 TABLET(S): 5; 325 TABLET ORAL at 17:21

## 2019-07-23 NOTE — BRIEF OPERATIVE NOTE - OPERATION/FINDINGS
large ulcer within the left base of tongue extending from tonsillar fossa to midline. exposed hyoid bone. multiple biopsies taken.

## 2019-07-23 NOTE — PACU DISCHARGE NOTE - COMMENTS
Pt denies pain and discomfort, cleared by ENT for discharge. Pt verbalized understanding to discharge instructions and medication. Pt discharged with wife.

## 2019-07-23 NOTE — ASU PREOP CHECKLIST - 2.
pt has small bruise on right hand, a small skin tear on left hand (due to his watch per pt), dry skin all over

## 2019-07-24 PROBLEM — C85.90 NON-HODGKIN LYMPHOMA, UNSPECIFIED, UNSPECIFIED SITE: Chronic | Status: ACTIVE | Noted: 2019-02-26

## 2019-07-26 LAB — SURGICAL PATHOLOGY STUDY: SIGNIFICANT CHANGE UP

## 2019-07-29 NOTE — PHYSICAL EXAM
[Thin] : thin [Normal Oral Cavity] : oral cavity was normal [Skin Color & Pigmentation] : normal skin color and pigmentation [Normal] : no focal deficits [Oriented To Time, Place, And Person] : oriented to person, place, and time [Sclera] : the sclera and conjunctiva were normal [Respiration, Rhythm And Depth] : normal respiratory rhythm and effort [] : no respiratory distress [Exaggerated Use Of Accessory Muscles For Inspiration] : no accessory muscle use

## 2019-07-29 NOTE — HISTORY OF PRESENT ILLNESS
[FreeTextEntry1] : Mr. Nathaniel Fuentes has completed radiation therapy to the oropharynx/ neck for a total of 7000 cGy over 35 fractions from 12/19/18 to 2/8/19. He did not have any unexpected treatment complications during the course of treatment. He received 5 out of 7 cycles of concurrent weekly Cisplatin with Dr. Blandon.  He has had a complicated post-CRT course detailed in prior notes and below.  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  He is now planned to undergo RT to a dose of 3000 cGy to the stomach.  \par \par 7/20/19 - OTV - Mr. Fuentes has completed 1500 cGy / 3000 cGy to the stomach.  He expresses unhappiness and frustration regarding the results of his biopsy last week.  He states is now considering not completing his current treatment to the stomach.  He states his pain has improved, no longer requiring oxycodone.  He  denies GI symptoms.  He states he is "not really" interested in a feeding tube at this point.  He is drinking ensure.  He states he weighed himself at 138 lbs this morning.  Unfortunately the in OR endo done by Dr. Miles noted a large necrotic ulcerated mass in the left hypopharynx representing necrosis of the hyoid bone and soft tissue fo the hypopharynx.  fortunately all pathology samples were negative for tumor. \par \par 7/22/19 - OTV - Mr. Fuentes has completed 1050 cGy / 3000 cGy to the stomach.  Today, he denies complaints.  He denies abdominal pain, nausea, vomiting, diarrhea, melena.  He notes chronic constipation, unchanged.  He states he is scheduled for laryngoscopy and biopsy with Dr. Miles tomorrow.  \par \par 7/15/19 - OTV - Mr. Fuentes has completed 300 cGy / 3000 cGy to the stomach.  He was admitted to [facility] on Thursday for bleeding from his mouth.  He states that he spit up approximately 1 cup of blood and his wife took him to the ED.  He states he underwent a CT scan, CT angio and several laryngoscopies.  He was told there is concern for recurrence of his base of tongue cancer.  He saw Dr. Phan Jackman at [facility] in follow-up on Friday and is scheduled to see Dr. Miles for second opinion tomorrow.  He states he is emotionally overwhelmed and exhausted.  He denies any further episodes of bleeding as well as GI complaints to include nausea, vomiting, constipation and diarrhea.  He did not take his prophylactic zofran today, and was therefore given a dose of 8mg in clinic today.  He will receive treatment following this visit. \par \par \par \par \par 7/8/19 - Follow-up - Mr. Fuentes was scheduled to begin RT to the stomach today.  He did not take his prophylactic zofran this morning, so declined treatment today.  He has opted to begin treatment tomorrow.  He and his wife present to clinic today for follow-up of his left base of tongue ulcer.  They state they have been seeing Dr. Clifton recently, and report that she has been happy with the progress of the ulcer.  He continues to have neck pain, which has improved significantly with oxycodone.  They plan to get a second opinion from Dr. Miles regarding his ongoing neck pain.  They also have some questions about his medications.  He points to his pain as bilateral and essentially overlying the hyoid bone.   he is able to swallow.   taste is poot\par \par 6/13/19 - SNA\par Mr. Fuentes returns for SNA.  He was last seen here on 6/3/19 at which time he was found to have a deep BOT ulcer with heaped edges on clinical exam.  PET/CT and CT were also concerning for uptake in BOT.  He was given a course of diflucan, magic mouthwash, and advised to use baking soda and meat tenderizer oral rinses and restart gabapentin and protonix.  He was also to be evaluated by Dr. Clifton for possible biopsy - she has decided to re-evaluate before deciding on biopsy.  \par \par Upper Endoscopy was done at [facility] (Dr. Santamaria) for Melena on 5/22/19; report shows "diffuse severe mucoosal changes characterized by granularity, inflammation, and nodularity were found in the cardia, in the gastric fundus, and gastric body. 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 Pylori and focal granulation tissue and fibrinopurulent debris. [facility] Path review pending.  He saw Dr. Blandon on 6/11/19 and discussed chemo vs. RT.  H/H was 12.0/38.3 on 6/11 (after Procrit).  \par \par He is here today for consideration of radiation therapy.  He states that his right facial pain is unchanged.  He will complete the course of diflucan in 3 days and is using mouth rinses with the exception of magic mouthwash.  He was encouraged to start using it.  He notes that he is "here for another reason" today.  He denies GI complaints including abdominal pain, nausea, vomiting, constipation, diarrhea, melena, hematemesis.  He has not yet followed up with GI for colonoscopy.  \par \par 6/3/19 - Follow-up\par Mr. Fuentes presents today for routine follow-up.  He was last seen here on 4/2/19.  Plan at that time was for PET/CT and CT neck with contrast in 6 weeks, continue weekly visits with Dr. Blandon, continue Ensure 3 cans/day and increase calories, continue gabapentin (300mg AM, 600mg PM), stop trazodone, continue VNS and SLP and exercises.  \par \par PET/CT 5/16/19 - Impression: \par 1. Increased FDG uptake within the root of the tongue.  While some portion of this activity may be related to treatment and subsequent ulceration, the possibility of persistent disease is raised because of the intensity and distribution of the uptake.\par 2.  Scattered subcentimeter mediastinal and para-aortic lymph nodes. \par 3.  FDG uptake diffusely throughout the stomach and may represent infectious versus inflammatory gastritis.  Please note that a similar pattern was seen on the patient's prior PET scan dated 11/12/2018.\par \par CT neck with contrast 5/16/19 - Impression: There is now a large ulcer involving the left floor of mouth that is lined by heterogenously enhancing soft tissue that is FDG avid.  Findings are suspicious for disease persistence along the ulcer walls.  Correlation with visual inspection and possibly tissue sampling is recommended for further characterization.  Presumed lymphoproliferative disease in the orbits and cervical lymph nodes has nearly resolved.  \par \par He last saw Dr. Blandon one week ago.  Today, he reports that he has been "up and down" since we last saw him.  He states that approximately two weeks ago, he was experiencing bloody stool.  His wife called an ambulance and he was taken to the ED at [facility].  He states that he was found to be anemic with hemoglobin of 3 and received 3 blood transfusions.  He  underwent GI workup including upper endoscopy which showed bleeding ulcerations in the stomach; all pathology was benign.  He notes that he had been taking Aleve for pain prior to this episode and has since stopped.  He was discharged home 3 days later.  He was advised to take protonix daily and follow-up with GI.  He states that he has stopped protonix because he has not had any episodes of bleeding.  He did not keep his follow-up appointment with GI as he was not feeling well that day.  His wife states she plans to reschedule.  \par \par He notes continued left neck pain and edema relieved with warm compresses and tylenol.  He states that is has worsened over the past 2-3 weeks.  Of note, he also reports stopping gabapentin around that time because he didn't think it was helping and it is "just another thing."  He reports continued odynophagia which prevents him from eating solid food.  He is still on a liquid diet with ensure.  He states he lost 12 pounds during his recent hospitalization but has started gaining some weight again.  He reports that xerostomia has resolved.  He plans to follow-up with his PMD Dr. Mckeon this week and will also see Dr. Clifton today.  \par \par 4/2/19- FOLLOW UP\par Mr. Fuentes presents for follow up. When he was last seen on 3/19/19, he was volume depleted and continuing to report throat pain. Plan at the time was for labs with Dr. BOBBY, complete course of Diflucan, increase Ensure plus to 3 cans/ day, continue MMW and Gabapentin, stop Trazodone, continue VNS services, and follow up with SLP. \par \par He saw SLP on 3/28/19, at which time he recommended H&N exercises to improve swallow strength. Complains of irritation in throat but no pain. Stopped trazadone and finished course of diflucan. Reports more energy to walk and exercise. Able to tolerate 3 cans of ensure a day. No longer using MMW, says its effects don’t last long. Able to tolerate liquids and soft foods. Taste beginning to come back. Constipation persists. Seeing Dr. BOBBY today for procrit. Sees him weekly per wife. He decided to cancel his trip to [place] this week. \par \par 3/19/19 - FOLLOW UP\par Today Using MMW, Biotene, he reports throat pain still persist, not using gabapentin regularly, encouraged to use as ordered to get relief. His wife says she is now forcing him to use gabapentin and to use MMW regularly. She also made sure he started the fluconazole RX. Tolerating only liquids, soft foods like yoghurt, potatoes mashed, cream of wheat. Assessed by visiting nurse, will start PT and Sp/sw therapy next week. Carries out physical activity as tolerated, walks 4 blocks daily. Constipation is relieved by miralax.\par \par 3/5/19- Pt is here for a followup 3/5/19. Today he states he feels better, tolerates puree foods(warm cereal and soups), 2-3 Ensure shakes and whey protein recommended by Nutritionist. Increased PO fluids now to 2 Liters per day. Reports Painful throat while swallowing. Relieved by MMW and biotene. Denies any other pain. Visit with Dr Blandon today at 1pm, will review bloodwork after. ER Visit from Dr Blandon office on 2/26/19 for very low hemoglobin, received blood products, Iron and IVF. Says he is taking diflucan which will complete today (although we sent in 15 pills 1 week ago - wife will check # of pills at home). \par \par 2/26/19- PTE\par Mr. Fuentes returns for an emergent PTE. Several phone calls took place over the past few weeks after he completed RT regarding poor PO intake. He was Rx'd MMW. He continued to lose weight since completion of treatment. On the night of 2/18/18, he fell in the middle of the night (did not hit his head as per patient's wife) due to dizziness. He declined coming in last week for assessment/ labs; he and his wife reported improved PO intake. His wife Selma emailed over the weekend reporting another fall due to slipping on liquid on the floor. She stated he had increased his fluid intake and was doing quite well until the fall. They also called his PCP Dr. Mckeon to have the Trazodone prescription refilled as he had been having Restless Leg Syndrome most nights. Patient and his wife thought this was the cause of the second fall as he was quite stable in the day but became off balance in the night. He last saw Dr. Blandon on 2/15/19 and has a follow up scheduled for this Friday. \par \par Today, he reports he is eating and drinking better. However, he feels generally weak and also has lightheadedness with position changes. He admits that he did hit his head with both falls, but denies headaches, LOC, confusion. He has lost four pounds since last seen on 2/8, but it is an improvement since last week (he has gained 4 pounds since last week). He is drinking 2 Ensure plus per day. Also eating soft/ pureed foods, soups, broth. He reports sore throat that is improving; was 9/ 10 last week, but now 5/ 10. Using MMW, which is helpful. Also reports xerostomia, using Biotene but only once a day. Patient and wife inquired about Trazodone; he is taking 100 mg daily for restless leg syndrome as per his PCP, but it makes him feel "disoriented." \par \par ______________________________________________\par ONCOLOGIC HISTORY \par Mr. 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 Notably, he has PMH non-Hodgkin's lymphoma (patient unsure of which subtype), diagnosed in 1999, and treated by Dr. Shepherd with CHOP and Fludarabine. \par \par He was followed by Dr. Clifton ([facility]) and had complaints of swelling and pain over the left submandibular gland in 2017. A CT scan of the neck done 12/12/17 showed no evidence of pharyngeal mass, asymmetry or laryngeal mass. There were numerous cervical lymph nodes which were larger on the left, as well as enlarged subclavian and axillary lymph nodes. \par \par He continued to have complaints of left neck pain and had occasional metallic taste in mouth in July 2018. He also developed a lisp/ difficulty speaking in July 2018.\par \par He underwent CT neck on 10/15/18, which showed the following:\par IMPRESSION: \par 1. Soft tissue mass involving the left tongue base compatible with a tongue base neoplasm\par 2. Superficial nodule involving the right cheek which may represent an enlarged periparotid lymph node measuring 2.0 x 1.1 cm in axial cross-section, minimally increased in size since the prior study\par 3. Scattered mildly enlarged lymph nodes in the neck again noted, relatively stable since the prior study\par 4. Abnormal soft tissue within the orbits, not significantly changed\par 5. Stable nodular densities within the right upper lung\par 6. Cervical spondylosis with canal and foraminal stenosis at C4-C5. \par \par He underwent a biopsy of the mass on 10/26/18 by Dr. Clifton which showed invasive SCC nonkeratinizing, poorly differentiated, p16 positive, HPV negative. Flow cytometry also done, but results were inconclusive.\par \par PET/CT ([facility])11/12/18\par IMPRESSION: \par 1. Robust lobular hypermetabolic soft tissue mass involving the tongue base consistent with known primary tongue base malignancy. It extends to the level of the hyoid without obvious involvement of the preepiglottic fat. If warranted, consider dedicated MR imaging for further assessment.\par 2. Assessment for metastatic regional lymph nodes from primary tongue malignancy is limited in this patient with evidence of low-grade lymphomatous involvement in the neck. Asymmetrically hypermetabolic but stable appearing lymph nodes in the left level III lymph node would be the most concerning for potential regional ozzy disease from tongue base malignancy.\par 3. Multiple homogeneous lymph nodes associated with low-grade uptake especially in the neck and chest and to a lesser extent the abdomen and pelvis is likely related to patient's history of non-Hodgkin's lymphoma. Associated metabolic activity is consistent with a low-grade viability.\par 4. No definite evidence to suggest focal splenic or marrow involvement of disease. Low-grade diffuse involvement is not excluded.\par 5. Stable appearance of peribronchial nodular foci in the right upper lobe, too small to characterize, but favoring a post inflammatory/infectious process. Other benign type changes are seen in the mid to lower lung fields, which can be reassessed at follow-up imaging.\par 6. No suspicious findings to suggest marginal or regional ozzy recurrence of prostate malignancy or evidence of osteoblastic metastatic disease.\par \par Today, he reports he feels generally well with exception of difficulty swallowing solid foods since the biopsy. He has been eating soups and soft foods. He also reports xerostomia and continues to have a lisp. He is mildly fatigue, but it improves with rest. He continues to work as a . He denies fever, chills, CP, SOB, N/V/D, decreased appetite. He has lost approx 6- 7 pounds since the biopsy on 10/26. Of note, he reports he is claustrophobic, has needed anxiolytics prior to imaging in the past. \par \par His dentist is Dr. Hager ([phone]) and he last saw Dr. Hager in September. \par Family history notable for brother with leukemia. \par \par

## 2019-07-29 NOTE — DISEASE MANAGEMENT
[Clinical] : TNM Stage: c [III] : III [FreeTextEntry4] : IAE MALT lymphoma of the stomach [NTNM] : - [TTNM] : - [de-identified] : 3000 cGy [MTNM] : - [de-identified] : 1650 cGy [de-identified] : stomach

## 2019-08-05 ENCOUNTER — APPOINTMENT (OUTPATIENT)
Dept: OTOLARYNGOLOGY | Facility: CLINIC | Age: 79
End: 2019-08-05
Payer: MEDICARE

## 2019-08-05 PROCEDURE — 31575 DIAGNOSTIC LARYNGOSCOPY: CPT

## 2019-08-08 NOTE — PHYSICAL EXAM
[Sclera] : the sclera and conjunctiva were normal [Thin] : thin [Normal Oral Cavity] : oral cavity was normal [] : no respiratory distress [Respiration, Rhythm And Depth] : normal respiratory rhythm and effort [Exaggerated Use Of Accessory Muscles For Inspiration] : no accessory muscle use [Skin Color & Pigmentation] : normal skin color and pigmentation [Oriented To Time, Place, And Person] : oriented to person, place, and time [Normal] : no focal deficits

## 2019-08-13 NOTE — REVIEW OF SYSTEMS
[Fatigue: Grade 1 - Fatigue relieved by rest] : Fatigue: Grade 1 - Fatigue relieved by rest [Negative] : Integumentary

## 2019-08-13 NOTE — VITALS
[70: Cares for self; unalbe to carry on normal activity or do active work.] : 70: Cares for self; unable to carry on normal activity or do active work. [ECOG Performance Status: 2 - Ambulatory and capable of all self care but unable to carry out any work activities] : Performance Status: 2 - Ambulatory and capable of all self care but unable to carry out any work activities. Up and about more than 50% of waking hours

## 2019-08-14 NOTE — DISEASE MANAGEMENT
[Clinical] : TNM Stage: c [III] : III [FreeTextEntry4] : IAE MALT lymphoma of the stomach [NTNM] : - [TTNM] : - [MTNM] : - [de-identified] : 2100 cGy [de-identified] : 3000 cGy [de-identified] : stomach

## 2019-08-14 NOTE — HISTORY OF PRESENT ILLNESS
[FreeTextEntry1] : Mr. Nathaniel Fuentes has completed radiation therapy to the oropharynx/ neck for a total of 7000 cGy over 35 fractions from 12/19/18 to 2/8/19. He did not have any unexpected treatment complications during the course of treatment. He received 5 out of 7 cycles of concurrent weekly Cisplatin with Dr. Blandon.  He has had a complicated post-CRT course detailed in prior notes and below.  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  He is now planned to undergo RT to a dose of 3000 cGy to the stomach.  \par \par 8/14/19 - OTV - Mr. Fuentes has completed 2100 cGy / 3000 cGy to the stomach.  He elected not to received treatment from 7/29 - 8/7.  Today, he reports feeling exhausted.  He wants to go home and is refusing vital signs/exam.  Agreed only to skin check.  Denies abdominal pain, nausea, vomiting, constipation, diarrhea. \par \par 7/20/19 - OTV - Mr. Fuentes has completed 1500 cGy / 3000 cGy to the stomach.  He expresses unhappiness and frustration regarding the results of his biopsy last week.  He states is now considering not completing his current treatment to the stomach.  He states his pain has improved, no longer requiring oxycodone.  He  denies GI symptoms.  He states he is "not really" interested in a feeding tube at this point.  He is drinking ensure.  He states he weighed himself at 138 lbs this morning.  Unfortunately the in OR endo done by Dr. Miles noted a large necrotic ulcerated mass in the left hypopharynx representing necrosis of the hyoid bone and soft tissue fo the hypopharynx.  fortunately all pathology samples were negative for tumor. \par \par 7/22/19 - OTV - Mr. Fuentes has completed 1050 cGy / 3000 cGy to the stomach.  Today, he denies complaints.  He denies abdominal pain, nausea, vomiting, diarrhea, melena.  He notes chronic constipation, unchanged.  He states he is scheduled for laryngoscopy and biopsy with Dr. Miles tomorrow.  \par \par 7/15/19 - OTV - Mr. Fuentes has completed 300 cGy / 3000 cGy to the stomach.  He was admitted to [facility] on Thursday for bleeding from his mouth.  He states that he spit up approximately 1 cup of blood and his wife took him to the ED.  He states he underwent a CT scan, CT angio and several laryngoscopies.  He was told there is concern for recurrence of his base of tongue cancer.  He saw Dr. Phan Jackman at [facility] in follow-up on Friday and is scheduled to see Dr. Miles for second opinion tomorrow.  He states he is emotionally overwhelmed and exhausted.  He denies any further episodes of bleeding as well as GI complaints to include nausea, vomiting, constipation and diarrhea.  He did not take his prophylactic zofran today, and was therefore given a dose of 8mg in clinic today.  He will receive treatment following this visit. \par \par \par \par \par 7/8/19 - Follow-up - Mr. Fuentes was scheduled to begin RT to the stomach today.  He did not take his prophylactic zofran this morning, so declined treatment today.  He has opted to begin treatment tomorrow.  He and his wife present to clinic today for follow-up of his left base of tongue ulcer.  They state they have been seeing Dr. Clifton recently, and report that she has been happy with the progress of the ulcer.  He continues to have neck pain, which has improved significantly with oxycodone.  They plan to get a second opinion from Dr. Miles regarding his ongoing neck pain.  They also have some questions about his medications.  He points to his pain as bilateral and essentially overlying the hyoid bone.   he is able to swallow.   taste is poot\par \par 6/13/19 - SNA\par Mr. Fuentes returns for SNA.  He was last seen here on 6/3/19 at which time he was found to have a deep BOT ulcer with heaped edges on clinical exam.  PET/CT and CT were also concerning for uptake in BOT.  He was given a course of diflucan, magic mouthwash, and advised to use baking soda and meat tenderizer oral rinses and restart gabapentin and protonix.  He was also to be evaluated by Dr. Clifton for possible biopsy - she has decided to re-evaluate before deciding on biopsy.  \par \par Upper Endoscopy was done at [facility] (Dr. Santamaria) for Melena on 5/22/19; report shows "diffuse severe mucoosal changes characterized by granularity, inflammation, and nodularity were found in the cardia, in the gastric fundus, and gastric body. 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 Pylori and focal granulation tissue and fibrinopurulent debris. [facility] Path review pending.  He saw Dr. Blandon on 6/11/19 and discussed chemo vs. RT.  H/H was 12.0/38.3 on 6/11 (after Procrit).  \par \par He is here today for consideration of radiation therapy.  He states that his right facial pain is unchanged.  He will complete the course of diflucan in 3 days and is using mouth rinses with the exception of magic mouthwash.  He was encouraged to start using it.  He notes that he is "here for another reason" today.  He denies GI complaints including abdominal pain, nausea, vomiting, constipation, diarrhea, melena, hematemesis.  He has not yet followed up with GI for colonoscopy.  \par \par 6/3/19 - Follow-up\par Mr. Fuentes presents today for routine follow-up.  He was last seen here on 4/2/19.  Plan at that time was for PET/CT and CT neck with contrast in 6 weeks, continue weekly visits with Dr. Blandon, continue Ensure 3 cans/day and increase calories, continue gabapentin (300mg AM, 600mg PM), stop trazodone, continue VNS and SLP and exercises.  \par \par PET/CT 5/16/19 - Impression: \par 1. Increased FDG uptake within the root of the tongue.  While some portion of this activity may be related to treatment and subsequent ulceration, the possibility of persistent disease is raised because of the intensity and distribution of the uptake.\par 2.  Scattered subcentimeter mediastinal and para-aortic lymph nodes. \par 3.  FDG uptake diffusely throughout the stomach and may represent infectious versus inflammatory gastritis.  Please note that a similar pattern was seen on the patient's prior PET scan dated 11/12/2018.\par \par CT neck with contrast 5/16/19 - Impression: There is now a large ulcer involving the left floor of mouth that is lined by heterogenously enhancing soft tissue that is FDG avid.  Findings are suspicious for disease persistence along the ulcer walls.  Correlation with visual inspection and possibly tissue sampling is recommended for further characterization.  Presumed lymphoproliferative disease in the orbits and cervical lymph nodes has nearly resolved.  \par \par He last saw Dr. Blandon one week ago.  Today, he reports that he has been "up and down" since we last saw him.  He states that approximately two weeks ago, he was experiencing bloody stool.  His wife called an ambulance and he was taken to the ED at [facility].  He states that he was found to be anemic with hemoglobin of 3 and received 3 blood transfusions.  He  underwent GI workup including upper endoscopy which showed bleeding ulcerations in the stomach; all pathology was benign.  He notes that he had been taking Aleve for pain prior to this episode and has since stopped.  He was discharged home 3 days later.  He was advised to take protonix daily and follow-up with GI.  He states that he has stopped protonix because he has not had any episodes of bleeding.  He did not keep his follow-up appointment with GI as he was not feeling well that day.  His wife states she plans to reschedule.  \par \par He notes continued left neck pain and edema relieved with warm compresses and tylenol.  He states that is has worsened over the past 2-3 weeks.  Of note, he also reports stopping gabapentin around that time because he didn't think it was helping and it is "just another thing."  He reports continued odynophagia which prevents him from eating solid food.  He is still on a liquid diet with ensure.  He states he lost 12 pounds during his recent hospitalization but has started gaining some weight again.  He reports that xerostomia has resolved.  He plans to follow-up with his PMD Dr. Mckeon this week and will also see Dr. Clifton today.  \par \par 4/2/19- FOLLOW UP\par Mr. Fuentes presents for follow up. When he was last seen on 3/19/19, he was volume depleted and continuing to report throat pain. Plan at the time was for labs with Dr. BOBBY, complete course of Diflucan, increase Ensure plus to 3 cans/ day, continue MMW and Gabapentin, stop Trazodone, continue VNS services, and follow up with SLP. \par \par He saw SLP on 3/28/19, at which time he recommended H&N exercises to improve swallow strength. Complains of irritation in throat but no pain. Stopped trazadone and finished course of diflucan. Reports more energy to walk and exercise. Able to tolerate 3 cans of ensure a day. No longer using MMW, says its effects don’t last long. Able to tolerate liquids and soft foods. Taste beginning to come back. Constipation persists. Seeing Dr. BOBBY today for procrit. Sees him weekly per wife. He decided to cancel his trip to [place] this week. \par \par 3/19/19 - FOLLOW UP\par Today Using MMW, Biotene, he reports throat pain still persist, not using gabapentin regularly, encouraged to use as ordered to get relief. His wife says she is now forcing him to use gabapentin and to use MMW regularly. She also made sure he started the fluconazole RX. Tolerating only liquids, soft foods like yoghurt, potatoes mashed, cream of wheat. Assessed by visiting nurse, will start PT and Sp/sw therapy next week. Carries out physical activity as tolerated, walks 4 blocks daily. Constipation is relieved by miralax.\par \par 3/5/19- Pt is here for a followup 3/5/19. Today he states he feels better, tolerates puree foods(warm cereal and soups), 2-3 Ensure shakes and whey protein recommended by Nutritionist. Increased PO fluids now to 2 Liters per day. Reports Painful throat while swallowing. Relieved by MMW and biotene. Denies any other pain. Visit with Dr Blandon today at 1pm, will review bloodwork after. ER Visit from Dr Blandon office on 2/26/19 for very low hemoglobin, received blood products, Iron and IVF. Says he is taking diflucan which will complete today (although we sent in 15 pills 1 week ago - wife will check # of pills at home). \par \par 2/26/19- PTE\par Mr. Fuentes returns for an emergent PTE. Several phone calls took place over the past few weeks after he completed RT regarding poor PO intake. He was Rx'd MMW. He continued to lose weight since completion of treatment. On the night of 2/18/18, he fell in the middle of the night (did not hit his head as per patient's wife) due to dizziness. He declined coming in last week for assessment/ labs; he and his wife reported improved PO intake. His wife Selma emailed over the weekend reporting another fall due to slipping on liquid on the floor. She stated he had increased his fluid intake and was doing quite well until the fall. They also called his PCP Dr. Mckeon to have the Trazodone prescription refilled as he had been having Restless Leg Syndrome most nights. Patient and his wife thought this was the cause of the second fall as he was quite stable in the day but became off balance in the night. He last saw Dr. Blandon on 2/15/19 and has a follow up scheduled for this Friday. \par \par Today, he reports he is eating and drinking better. However, he feels generally weak and also has lightheadedness with position changes. He admits that he did hit his head with both falls, but denies headaches, LOC, confusion. He has lost four pounds since last seen on 2/8, but it is an improvement since last week (he has gained 4 pounds since last week). He is drinking 2 Ensure plus per day. Also eating soft/ pureed foods, soups, broth. He reports sore throat that is improving; was 9/ 10 last week, but now 5/ 10. Using MMW, which is helpful. Also reports xerostomia, using Biotene but only once a day. Patient and wife inquired about Trazodone; he is taking 100 mg daily for restless leg syndrome as per his PCP, but it makes him feel "disoriented." \par \par ______________________________________________\par ONCOLOGIC HISTORY \par Mr. 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 Notably, he has PMH non-Hodgkin's lymphoma (patient unsure of which subtype), diagnosed in 1999, and treated by Dr. Shepherd with CHOP and Fludarabine. \par \par He was followed by Dr. Clifton ([facility]) and had complaints of swelling and pain over the left submandibular gland in 2017. A CT scan of the neck done 12/12/17 showed no evidence of pharyngeal mass, asymmetry or laryngeal mass. There were numerous cervical lymph nodes which were larger on the left, as well as enlarged subclavian and axillary lymph nodes. \par \par He continued to have complaints of left neck pain and had occasional metallic taste in mouth in July 2018. He also developed a lisp/ difficulty speaking in July 2018.\par \par He underwent CT neck on 10/15/18, which showed the following:\par IMPRESSION: \par 1. Soft tissue mass involving the left tongue base compatible with a tongue base neoplasm\par 2. Superficial nodule involving the right cheek which may represent an enlarged periparotid lymph node measuring 2.0 x 1.1 cm in axial cross-section, minimally increased in size since the prior study\par 3. Scattered mildly enlarged lymph nodes in the neck again noted, relatively stable since the prior study\par 4. Abnormal soft tissue within the orbits, not significantly changed\par 5. Stable nodular densities within the right upper lung\par 6. Cervical spondylosis with canal and foraminal stenosis at C4-C5. \par \par He underwent a biopsy of the mass on 10/26/18 by Dr. Clifton which showed invasive SCC nonkeratinizing, poorly differentiated, p16 positive, HPV negative. Flow cytometry also done, but results were inconclusive.\par \par PET/CT ([facility])11/12/18\par IMPRESSION: \par 1. Robust lobular hypermetabolic soft tissue mass involving the tongue base consistent with known primary tongue base malignancy. It extends to the level of the hyoid without obvious involvement of the preepiglottic fat. If warranted, consider dedicated MR imaging for further assessment.\par 2. Assessment for metastatic regional lymph nodes from primary tongue malignancy is limited in this patient with evidence of low-grade lymphomatous involvement in the neck. Asymmetrically hypermetabolic but stable appearing lymph nodes in the left level III lymph node would be the most concerning for potential regional ozzy disease from tongue base malignancy.\par 3. Multiple homogeneous lymph nodes associated with low-grade uptake especially in the neck and chest and to a lesser extent the abdomen and pelvis is likely related to patient's history of non-Hodgkin's lymphoma. Associated metabolic activity is consistent with a low-grade viability.\par 4. No definite evidence to suggest focal splenic or marrow involvement of disease. Low-grade diffuse involvement is not excluded.\par 5. Stable appearance of peribronchial nodular foci in the right upper lobe, too small to characterize, but favoring a post inflammatory/infectious process. Other benign type changes are seen in the mid to lower lung fields, which can be reassessed at follow-up imaging.\par 6. No suspicious findings to suggest marginal or regional ozzy recurrence of prostate malignancy or evidence of osteoblastic metastatic disease.\par \par Today, he reports he feels generally well with exception of difficulty swallowing solid foods since the biopsy. He has been eating soups and soft foods. He also reports xerostomia and continues to have a lisp. He is mildly fatigue, but it improves with rest. He continues to work as a . He denies fever, chills, CP, SOB, N/V/D, decreased appetite. He has lost approx 6- 7 pounds since the biopsy on 10/26. Of note, he reports he is claustrophobic, has needed anxiolytics prior to imaging in the past. \par \par His dentist is Dr. Hager ([phone]) and he last saw Dr. Hager in September. \par Family history notable for brother with leukemia. \par \par

## 2019-08-14 NOTE — PHYSICAL EXAM
[Thin] : thin [Normal Oral Cavity] : oral cavity was normal [Respiration, Rhythm And Depth] : normal respiratory rhythm and effort [Skin Color & Pigmentation] : normal skin color and pigmentation [Oriented To Time, Place, And Person] : oriented to person, place, and time [Exaggerated Use Of Accessory Muscles For Inspiration] : no accessory muscle use [Nondistended] : nondistended

## 2019-08-19 VITALS
SYSTOLIC BLOOD PRESSURE: 112 MMHG | WEIGHT: 136.5 LBS | HEART RATE: 90 BPM | DIASTOLIC BLOOD PRESSURE: 74 MMHG | BODY MASS INDEX: 19.59 KG/M2 | OXYGEN SATURATION: 100 % | RESPIRATION RATE: 16 BRPM

## 2019-08-19 NOTE — PHYSICAL EXAM
[Thin] : thin [Normal Oral Cavity] : oral cavity was normal [Respiration, Rhythm And Depth] : normal respiratory rhythm and effort [Exaggerated Use Of Accessory Muscles For Inspiration] : no accessory muscle use [Nondistended] : nondistended [Skin Color & Pigmentation] : normal skin color and pigmentation [Oriented To Time, Place, And Person] : oriented to person, place, and time [Abdomen Soft] : soft

## 2019-08-19 NOTE — DISEASE MANAGEMENT
[Clinical] : TNM Stage: c [III] : III [FreeTextEntry4] : IAE MALT lymphoma of the stomach [TTNM] : - [NTNM] : - [MTNM] : - [de-identified] : 2550 cGy [de-identified] : 3000 cGy [de-identified] : stomach

## 2019-08-19 NOTE — HISTORY OF PRESENT ILLNESS
[FreeTextEntry1] : Mr. Nathaniel Fuentes has completed radiation therapy to the oropharynx/ neck for a total of 7000 cGy over 35 fractions from 12/19/18 to 2/8/19. He did not have any unexpected treatment complications during the course of treatment. He received 5 out of 7 cycles of concurrent weekly Cisplatin with Dr. Blandon.  He has had a complicated post-CRT course detailed in prior notes and below.  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  He is now planned to undergo RT to a dose of 3000 cGy to the stomach.  \par \par 8/19/19 - OTV - Mr. Fuentes has completed 2550 cGy / 3000 cGy to the stomach.  Today, he notes fatigue.  Denies nausea, vomiting, constipation diarrhea and skin irritation.  \par \par 8/14/19 - OTV - Mr. Fuentes has completed 2100 cGy / 3000 cGy to the stomach.  He elected not to received treatment from 7/29 - 8/7.  Today, he reports feeling exhausted.  He wants to go home and is refusing vital signs/exam.  Agreed only to skin check.  Denies abdominal pain, nausea, vomiting, constipation, diarrhea. \par \par 7/20/19 - OTV - Mr. Fuentes has completed 1500 cGy / 3000 cGy to the stomach.  He expresses unhappiness and frustration regarding the results of his biopsy last week.  He states is now considering not completing his current treatment to the stomach.  He states his pain has improved, no longer requiring oxycodone.  He  denies GI symptoms.  He states he is "not really" interested in a feeding tube at this point.  He is drinking ensure.  He states he weighed himself at 138 lbs this morning.  Unfortunately the in OR endo done by Dr. Miles noted a large necrotic ulcerated mass in the left hypopharynx representing necrosis of the hyoid bone and soft tissue fo the hypopharynx.  fortunately all pathology samples were negative for tumor. \par \par 7/22/19 - OTV - Mr. Fuentes has completed 1050 cGy / 3000 cGy to the stomach.  Today, he denies complaints.  He denies abdominal pain, nausea, vomiting, diarrhea, melena.  He notes chronic constipation, unchanged.  He states he is scheduled for laryngoscopy and biopsy with Dr. Miles tomorrow.  \par \par 7/15/19 - OTV - Mr. Fuentes has completed 300 cGy / 3000 cGy to the stomach.  He was admitted to [facility] on Thursday for bleeding from his mouth.  He states that he spit up approximately 1 cup of blood and his wife took him to the ED.  He states he underwent a CT scan, CT angio and several laryngoscopies.  He was told there is concern for recurrence of his base of tongue cancer.  He saw Dr. Phan Jackman at [facility] in follow-up on Friday and is scheduled to see Dr. Miles for second opinion tomorrow.  He states he is emotionally overwhelmed and exhausted.  He denies any further episodes of bleeding as well as GI complaints to include nausea, vomiting, constipation and diarrhea.  He did not take his prophylactic zofran today, and was therefore given a dose of 8mg in clinic today.  He will receive treatment following this visit. \par \par \par \par \par 7/8/19 - Follow-up - Mr. Fuentes was scheduled to begin RT to the stomach today.  He did not take his prophylactic zofran this morning, so declined treatment today.  He has opted to begin treatment tomorrow.  He and his wife present to clinic today for follow-up of his left base of tongue ulcer.  They state they have been seeing Dr. Clifton recently, and report that she has been happy with the progress of the ulcer.  He continues to have neck pain, which has improved significantly with oxycodone.  They plan to get a second opinion from Dr. Miles regarding his ongoing neck pain.  They also have some questions about his medications.  He points to his pain as bilateral and essentially overlying the hyoid bone.   he is able to swallow.   taste is poot\par \par 6/13/19 - SNA\par Mr. Fuentes returns for SNA.  He was last seen here on 6/3/19 at which time he was found to have a deep BOT ulcer with heaped edges on clinical exam.  PET/CT and CT were also concerning for uptake in BOT.  He was given a course of diflucan, magic mouthwash, and advised to use baking soda and meat tenderizer oral rinses and restart gabapentin and protonix.  He was also to be evaluated by Dr. Clifton for possible biopsy - she has decided to re-evaluate before deciding on biopsy.  \par \par Upper Endoscopy was done at [facility] (Dr. Santamaria) for Melena on 5/22/19; report shows "diffuse severe mucoosal changes characterized by granularity, inflammation, and nodularity were found in the cardia, in the gastric fundus, and gastric body. 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 Pylori and focal granulation tissue and fibrinopurulent debris. [facility] Path review pending.  He saw Dr. Blandon on 6/11/19 and discussed chemo vs. RT.  H/H was 12.0/38.3 on 6/11 (after Procrit).  \par \par He is here today for consideration of radiation therapy.  He states that his right facial pain is unchanged.  He will complete the course of diflucan in 3 days and is using mouth rinses with the exception of magic mouthwash.  He was encouraged to start using it.  He notes that he is "here for another reason" today.  He denies GI complaints including abdominal pain, nausea, vomiting, constipation, diarrhea, melena, hematemesis.  He has not yet followed up with GI for colonoscopy.  \par \par 6/3/19 - Follow-up\par Mr. Fuentes presents today for routine follow-up.  He was last seen here on 4/2/19.  Plan at that time was for PET/CT and CT neck with contrast in 6 weeks, continue weekly visits with Dr. Blandon, continue Ensure 3 cans/day and increase calories, continue gabapentin (300mg AM, 600mg PM), stop trazodone, continue VNS and SLP and exercises.  \par \par PET/CT 5/16/19 - Impression: \par 1. Increased FDG uptake within the root of the tongue.  While some portion of this activity may be related to treatment and subsequent ulceration, the possibility of persistent disease is raised because of the intensity and distribution of the uptake.\par 2.  Scattered subcentimeter mediastinal and para-aortic lymph nodes. \par 3.  FDG uptake diffusely throughout the stomach and may represent infectious versus inflammatory gastritis.  Please note that a similar pattern was seen on the patient's prior PET scan dated 11/12/2018.\par \par CT neck with contrast 5/16/19 - Impression: There is now a large ulcer involving the left floor of mouth that is lined by heterogenously enhancing soft tissue that is FDG avid.  Findings are suspicious for disease persistence along the ulcer walls.  Correlation with visual inspection and possibly tissue sampling is recommended for further characterization.  Presumed lymphoproliferative disease in the orbits and cervical lymph nodes has nearly resolved.  \par \par He last saw Dr. Blandon one week ago.  Today, he reports that he has been "up and down" since we last saw him.  He states that approximately two weeks ago, he was experiencing bloody stool.  His wife called an ambulance and he was taken to the ED at [facility].  He states that he was found to be anemic with hemoglobin of 3 and received 3 blood transfusions.  He  underwent GI workup including upper endoscopy which showed bleeding ulcerations in the stomach; all pathology was benign.  He notes that he had been taking Aleve for pain prior to this episode and has since stopped.  He was discharged home 3 days later.  He was advised to take protonix daily and follow-up with GI.  He states that he has stopped protonix because he has not had any episodes of bleeding.  He did not keep his follow-up appointment with GI as he was not feeling well that day.  His wife states she plans to reschedule.  \par \par He notes continued left neck pain and edema relieved with warm compresses and tylenol.  He states that is has worsened over the past 2-3 weeks.  Of note, he also reports stopping gabapentin around that time because he didn't think it was helping and it is "just another thing."  He reports continued odynophagia which prevents him from eating solid food.  He is still on a liquid diet with ensure.  He states he lost 12 pounds during his recent hospitalization but has started gaining some weight again.  He reports that xerostomia has resolved.  He plans to follow-up with his PMD Dr. Mckeon this week and will also see Dr. Clifton today.  \par \par 4/2/19- FOLLOW UP\par Mr. Fuentes presents for follow up. When he was last seen on 3/19/19, he was volume depleted and continuing to report throat pain. Plan at the time was for labs with Dr. BOBBY, complete course of Diflucan, increase Ensure plus to 3 cans/ day, continue MMW and Gabapentin, stop Trazodone, continue VNS services, and follow up with SLP. \par \par He saw SLP on 3/28/19, at which time he recommended H&N exercises to improve swallow strength. Complains of irritation in throat but no pain. Stopped trazadone and finished course of diflucan. Reports more energy to walk and exercise. Able to tolerate 3 cans of ensure a day. No longer using MMW, says its effects don’t last long. Able to tolerate liquids and soft foods. Taste beginning to come back. Constipation persists. Seeing Dr. BOBBY today for procrit. Sees him weekly per wife. He decided to cancel his trip to [place] this week. \par \par 3/19/19 - FOLLOW UP\par Today Using MMW, Biotene, he reports throat pain still persist, not using gabapentin regularly, encouraged to use as ordered to get relief. His wife says she is now forcing him to use gabapentin and to use MMW regularly. She also made sure he started the fluconazole RX. Tolerating only liquids, soft foods like yoghurt, potatoes mashed, cream of wheat. Assessed by visiting nurse, will start PT and Sp/sw therapy next week. Carries out physical activity as tolerated, walks 4 blocks daily. Constipation is relieved by miralax.\par \par 3/5/19- Pt is here for a followup 3/5/19. Today he states he feels better, tolerates puree foods(warm cereal and soups), 2-3 Ensure shakes and whey protein recommended by Nutritionist. Increased PO fluids now to 2 Liters per day. Reports Painful throat while swallowing. Relieved by MMW and biotene. Denies any other pain. Visit with Dr Blandon today at 1pm, will review bloodwork after. ER Visit from Dr Blandon office on 2/26/19 for very low hemoglobin, received blood products, Iron and IVF. Says he is taking diflucan which will complete today (although we sent in 15 pills 1 week ago - wife will check # of pills at home). \par \par 2/26/19- PTE\par Mr. Fuentes returns for an emergent PTE. Several phone calls took place over the past few weeks after he completed RT regarding poor PO intake. He was Rx'd MMW. He continued to lose weight since completion of treatment. On the night of 2/18/18, he fell in the middle of the night (did not hit his head as per patient's wife) due to dizziness. He declined coming in last week for assessment/ labs; he and his wife reported improved PO intake. His wife Selma emailed over the weekend reporting another fall due to slipping on liquid on the floor. She stated he had increased his fluid intake and was doing quite well until the fall. They also called his PCP Dr. Mckeon to have the Trazodone prescription refilled as he had been having Restless Leg Syndrome most nights. Patient and his wife thought this was the cause of the second fall as he was quite stable in the day but became off balance in the night. He last saw Dr. Blandon on 2/15/19 and has a follow up scheduled for this Friday. \par \par Today, he reports he is eating and drinking better. However, he feels generally weak and also has lightheadedness with position changes. He admits that he did hit his head with both falls, but denies headaches, LOC, confusion. He has lost four pounds since last seen on 2/8, but it is an improvement since last week (he has gained 4 pounds since last week). He is drinking 2 Ensure plus per day. Also eating soft/ pureed foods, soups, broth. He reports sore throat that is improving; was 9/ 10 last week, but now 5/ 10. Using MMW, which is helpful. Also reports xerostomia, using Biotene but only once a day. Patient and wife inquired about Trazodone; he is taking 100 mg daily for restless leg syndrome as per his PCP, but it makes him feel "disoriented." \par \par ______________________________________________\par ONCOLOGIC HISTORY \par Mr. 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 Notably, he has PMH non-Hodgkin's lymphoma (patient unsure of which subtype), diagnosed in 1999, and treated by Dr. Shepherd with CHOP and Fludarabine. \par \par He was followed by Dr. Clifton (Head [facility] Neck) and had complaints of swelling and pain over the left submandibular gland in 2017. A CT scan of the neck done 12/12/17 showed no evidence of pharyngeal mass, asymmetry or laryngeal mass. There were numerous cervical lymph nodes which were larger on the left, as well as enlarged subclavian and axillary lymph nodes. \par \par He continued to have complaints of left neck pain and had occasional metallic taste in mouth in July 2018. He also developed a lisp/ difficulty speaking in July 2018.\par \par He underwent CT neck on 10/15/18, which showed the following:\par IMPRESSION: \par 1. Soft tissue mass involving the left tongue base compatible with a tongue base neoplasm\par 2. Superficial nodule involving the right cheek which may represent an enlarged periparotid lymph node measuring 2.0 x 1.1 cm in axial cross-section, minimally increased in size since the prior study\par 3. Scattered mildly enlarged lymph nodes in the neck again noted, relatively stable since the prior study\par 4. Abnormal soft tissue within the orbits, not significantly changed\par 5. Stable nodular densities within the right upper lung\par 6. Cervical spondylosis with canal and foraminal stenosis at C4-C5. \par \par He underwent a biopsy of the mass on 10/26/18 by Dr. Clifton which showed invasive SCC nonkeratinizing, poorly differentiated, p16 positive, HPV negative. Flow cytometry also done, but results were inconclusive.\par \par PET/CT ([facility])11/12/18\par IMPRESSION: \par 1. Robust lobular hypermetabolic soft tissue mass involving the tongue base consistent with known primary tongue base malignancy. It extends to the level of the hyoid without obvious involvement of the preepiglottic fat. If warranted, consider dedicated MR imaging for further assessment.\par 2. Assessment for metastatic regional lymph nodes from primary tongue malignancy is limited in this patient with evidence of low-grade lymphomatous involvement in the neck. Asymmetrically hypermetabolic but stable appearing lymph nodes in the left level III lymph node would be the most concerning for potential regional ozzy disease from tongue base malignancy.\par 3. Multiple homogeneous lymph nodes associated with low-grade uptake especially in the neck and chest and to a lesser extent the abdomen and pelvis is likely related to patient's history of non-Hodgkin's lymphoma. Associated metabolic activity is consistent with a low-grade viability.\par 4. No definite evidence to suggest focal splenic or marrow involvement of disease. Low-grade diffuse involvement is not excluded.\par 5. Stable appearance of peribronchial nodular foci in the right upper lobe, too small to characterize, but favoring a post inflammatory/infectious process. Other benign type changes are seen in the mid to lower lung fields, which can be reassessed at follow-up imaging.\par 6. No suspicious findings to suggest marginal or regional ozzy recurrence of prostate malignancy or evidence of osteoblastic metastatic disease.\par \par Today, he reports he feels generally well with exception of difficulty swallowing solid foods since the biopsy. He has been eating soups and soft foods. He also reports xerostomia and continues to have a lisp. He is mildly fatigue, but it improves with rest. He continues to work as a . He denies fever, chills, CP, SOB, N/V/D, decreased appetite. He has lost approx 6- 7 pounds since the biopsy on 10/26. Of note, he reports he is claustrophobic, has needed anxiolytics prior to imaging in the past. \par \par His dentist is Dr. Hager ([phone]) and he last saw Dr. Hager in September. \par Family history notable for brother with leukemia. \par \par

## 2019-09-04 NOTE — DISEASE MANAGEMENT
[Clinical] : TNM Stage: c [III] : III [FreeTextEntry4] : IAE MALT lymphoma of the stomach [NTNM] : - [TTNM] : - [MTNM] : - [de-identified] : 2100 cGy [de-identified] : 3000 cGy [de-identified] : stomach

## 2019-09-04 NOTE — HISTORY OF PRESENT ILLNESS
[FreeTextEntry1] : Mr. Nathaniel Fuentes has completed radiation therapy to the oropharynx/ neck for a total of 7000 cGy over 35 fractions from 12/19/18 to 2/8/19. He did not have any unexpected treatment complications during the course of treatment. He received 5 out of 7 cycles of concurrent weekly Cisplatin with Dr. Blandon.  He has had a complicated post-CRT course detailed in prior notes and below.  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  He is now planned to undergo RT to a dose of 3000 cGy to the stomach.  \par \par 8/12/19 - OTV - Mr. Fuentes has completed 2100 cGy / 3000 cGy to the stomach.  He elected not to received treatment from 7/29 - 8/7.  Today, he resume his treatment, although I don't think his intention is to complete the script. \par \par 7/20/19 - OTV - Mr. Fuentes has completed 1500 cGy / 3000 cGy to the stomach.  He expresses unhappiness and frustration regarding the results of his biopsy last week.  He states is now considering not completing his current treatment to the stomach.  He states his pain has improved, no longer requiring oxycodone.  He  denies GI symptoms.  He states he is "not really" interested in a feeding tube at this point.  He is drinking ensure.  He states he weighed himself at 138 lbs this morning.  Unfortunately the in OR endo done by Dr. Miles noted a large necrotic ulcerated mass in the left hypopharynx representing necrosis of the hyoid bone and soft tissue fo the hypopharynx.  fortunately all pathology samples were negative for tumor. \par \par 7/22/19 - OTV - Mr. Fuentes has completed 1050 cGy / 3000 cGy to the stomach.  Today, he denies complaints.  He denies abdominal pain, nausea, vomiting, diarrhea, melena.  He notes chronic constipation, unchanged.  He states he is scheduled for laryngoscopy and biopsy with Dr. Miles tomorrow.  \par \par 7/15/19 - OTV - Mr. Fuentes has completed 300 cGy / 3000 cGy to the stomach.  He was admitted to [facility] on Thursday for bleeding from his mouth.  He states that he spit up approximately 1 cup of blood and his wife took him to the ED.  He states he underwent a CT scan, CT angio and several laryngoscopies.  He was told there is concern for recurrence of his base of tongue cancer.  He saw Dr. Phan Jackman at [facility] in follow-up on Friday and is scheduled to see Dr. Miles for second opinion tomorrow.  He states he is emotionally overwhelmed and exhausted.  He denies any further episodes of bleeding as well as GI complaints to include nausea, vomiting, constipation and diarrhea.  He did not take his prophylactic zofran today, and was therefore given a dose of 8mg in clinic today.  He will receive treatment following this visit. \par \par \par \par \par 7/8/19 - Follow-up - Mr. Fuentes was scheduled to begin RT to the stomach today.  He did not take his prophylactic zofran this morning, so declined treatment today.  He has opted to begin treatment tomorrow.  He and his wife present to clinic today for follow-up of his left base of tongue ulcer.  They state they have been seeing Dr. Clifton recently, and report that she has been happy with the progress of the ulcer.  He continues to have neck pain, which has improved significantly with oxycodone.  They plan to get a second opinion from Dr. Miles regarding his ongoing neck pain.  They also have some questions about his medications.  He points to his pain as bilateral and essentially overlying the hyoid bone.   he is able to swallow.   taste is poot\par \par 6/13/19 - SNA\par Mr. Fuentes returns for SNA.  He was last seen here on 6/3/19 at which time he was found to have a deep BOT ulcer with heaped edges on clinical exam.  PET/CT and CT were also concerning for uptake in BOT.  He was given a course of diflucan, magic mouthwash, and advised to use baking soda and meat tenderizer oral rinses and restart gabapentin and protonix.  He was also to be evaluated by Dr. Clifton for possible biopsy - she has decided to re-evaluate before deciding on biopsy.  \par \par Upper Endoscopy was done at [facility] (Dr. Santamaria) for Melena on 5/22/19; report shows "diffuse severe mucoosal changes characterized by granularity, inflammation, and nodularity were found in the cardia, in the gastric fundus, and gastric body. 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 Pylori and focal granulation tissue and fibrinopurulent debris. [facility] Path review pending.  He saw Dr. Blandon on 6/11/19 and discussed chemo vs. RT.  H/H was 12.0/38.3 on 6/11 (after Procrit).  \par \par He is here today for consideration of radiation therapy.  He states that his right facial pain is unchanged.  He will complete the course of diflucan in 3 days and is using mouth rinses with the exception of magic mouthwash.  He was encouraged to start using it.  He notes that he is "here for another reason" today.  He denies GI complaints including abdominal pain, nausea, vomiting, constipation, diarrhea, melena, hematemesis.  He has not yet followed up with GI for colonoscopy.  \par \par 6/3/19 - Follow-up\par Mr. Fuentes presents today for routine follow-up.  He was last seen here on 4/2/19.  Plan at that time was for PET/CT and CT neck with contrast in 6 weeks, continue weekly visits with Dr. Blandon, continue Ensure 3 cans/day and increase calories, continue gabapentin (300mg AM, 600mg PM), stop trazodone, continue VNS and SLP and exercises.  \par \par PET/CT 5/16/19 - Impression: \par 1. Increased FDG uptake within the root of the tongue.  While some portion of this activity may be related to treatment and subsequent ulceration, the possibility of persistent disease is raised because of the intensity and distribution of the uptake.\par 2.  Scattered subcentimeter mediastinal and para-aortic lymph nodes. \par 3.  FDG uptake diffusely throughout the stomach and may represent infectious versus inflammatory gastritis.  Please note that a similar pattern was seen on the patient's prior PET scan dated 11/12/2018.\par \par CT neck with contrast 5/16/19 - Impression: There is now a large ulcer involving the left floor of mouth that is lined by heterogenously enhancing soft tissue that is FDG avid.  Findings are suspicious for disease persistence along the ulcer walls.  Correlation with visual inspection and possibly tissue sampling is recommended for further characterization.  Presumed lymphoproliferative disease in the orbits and cervical lymph nodes has nearly resolved.  \par \par He last saw Dr. Blandon one week ago.  Today, he reports that he has been "up and down" since we last saw him.  He states that approximately two weeks ago, he was experiencing bloody stool.  His wife called an ambulance and he was taken to the ED at [facility].  He states that he was found to be anemic with hemoglobin of 3 and received 3 blood transfusions.  He  underwent GI workup including upper endoscopy which showed bleeding ulcerations in the stomach; all pathology was benign.  He notes that he had been taking Aleve for pain prior to this episode and has since stopped.  He was discharged home 3 days later.  He was advised to take protonix daily and follow-up with GI.  He states that he has stopped protonix because he has not had any episodes of bleeding.  He did not keep his follow-up appointment with GI as he was not feeling well that day.  His wife states she plans to reschedule.  \par \par He notes continued left neck pain and edema relieved with warm compresses and tylenol.  He states that is has worsened over the past 2-3 weeks.  Of note, he also reports stopping gabapentin around that time because he didn't think it was helping and it is "just another thing."  He reports continued odynophagia which prevents him from eating solid food.  He is still on a liquid diet with ensure.  He states he lost 12 pounds during his recent hospitalization but has started gaining some weight again.  He reports that xerostomia has resolved.  He plans to follow-up with his PMD Dr. Mckeon this week and will also see Dr. Clifton today.  \par \par 4/2/19- FOLLOW UP\par Mr. Fuentes presents for follow up. When he was last seen on 3/19/19, he was volume depleted and continuing to report throat pain. Plan at the time was for labs with Dr. BOBBY, complete course of Diflucan, increase Ensure plus to 3 cans/ day, continue MMW and Gabapentin, stop Trazodone, continue VNS services, and follow up with SLP. \par \par He saw SLP on 3/28/19, at which time he recommended H&N exercises to improve swallow strength. Complains of irritation in throat but no pain. Stopped trazadone and finished course of diflucan. Reports more energy to walk and exercise. Able to tolerate 3 cans of ensure a day. No longer using MMW, says its effects don’t last long. Able to tolerate liquids and soft foods. Taste beginning to come back. Constipation persists. Seeing Dr. BOBBY today for eleno. Sees him weekly per wife. He decided to cancel his trip to [place] this week. \par \par 3/19/19 - FOLLOW UP\par Today Using MMW, Biotene, he reports throat pain still persist, not using gabapentin regularly, encouraged to use as ordered to get relief. His wife says she is now forcing him to use gabapentin and to use MMW regularly. She also made sure he started the fluconazole RX. Tolerating only liquids, soft foods like yoghurt, potatoes mashed, cream of wheat. Assessed by visiting nurse, will start PT and Sp/sw therapy next week. Carries out physical activity as tolerated, walks 4 blocks daily. Constipation is relieved by miralax.\par \par 3/5/19- Pt is here for a followup 3/5/19. Today he states he feels better, tolerates puree foods(warm cereal and soups), 2-3 Ensure shakes and whey protein recommended by Nutritionist. Increased PO fluids now to 2 Liters per day. Reports Painful throat while swallowing. Relieved by MMW and biotene. Denies any other pain. Visit with Dr Blandon today at 1pm, will review bloodwork after. ER Visit from Dr Blandon office on 2/26/19 for very low hemoglobin, received blood products, Iron and IVF. Says he is taking diflucan which will complete today (although we sent in 15 pills 1 week ago - wife will check # of pills at home). \par \par 2/26/19- PTE\par Mr. Fuentes returns for an emergent PTE. Several phone calls took place over the past few weeks after he completed RT regarding poor PO intake. He was Rx'd MMW. He continued to lose weight since completion of treatment. On the night of 2/18/18, he fell in the middle of the night (did not hit his head as per patient's wife) due to dizziness. He declined coming in last week for assessment/ labs; he and his wife reported improved PO intake. His wife Selma emailed over the weekend reporting another fall due to slipping on liquid on the floor. She stated he had increased his fluid intake and was doing quite well until the fall. They also called his PCP Dr. Mckeon to have the Trazodone prescription refilled as he had been having Restless Leg Syndrome most nights. Patient and his wife thought this was the cause of the second fall as he was quite stable in the day but became off balance in the night. He last saw Dr. Blandon on 2/15/19 and has a follow up scheduled for this Friday. \par \par Today, he reports he is eating and drinking better. However, he feels generally weak and also has lightheadedness with position changes. He admits that he did hit his head with both falls, but denies headaches, LOC, confusion. He has lost four pounds since last seen on 2/8, but it is an improvement since last week (he has gained 4 pounds since last week). He is drinking 2 Ensure plus per day. Also eating soft/ pureed foods, soups, broth. He reports sore throat that is improving; was 9/ 10 last week, but now 5/ 10. Using MMW, which is helpful. Also reports xerostomia, using Biotene but only once a day. Patient and wife inquired about Trazodone; he is taking 100 mg daily for restless leg syndrome as per his PCP, but it makes him feel "disoriented." \par \par ______________________________________________\par ONCOLOGIC HISTORY \par Mr. 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 Notably, he has PMH non-Hodgkin's lymphoma (patient unsure of which subtype), diagnosed in 1999, and treated by Dr. Shepherd with CHOP and Fludarabine. \par \par He was followed by Dr. Clifton (Head [facility] Neck) and had complaints of swelling and pain over the left submandibular gland in 2017. A CT scan of the neck done 12/12/17 showed no evidence of pharyngeal mass, asymmetry or laryngeal mass. There were numerous cervical lymph nodes which were larger on the left, as well as enlarged subclavian and axillary lymph nodes. \par \par He continued to have complaints of left neck pain and had occasional metallic taste in mouth in July 2018. He also developed a lisp/ difficulty speaking in July 2018.\par \par He underwent CT neck on 10/15/18, which showed the following:\par IMPRESSION: \par 1. Soft tissue mass involving the left tongue base compatible with a tongue base neoplasm\par 2. Superficial nodule involving the right cheek which may represent an enlarged periparotid lymph node measuring 2.0 x 1.1 cm in axial cross-section, minimally increased in size since the prior study\par 3. Scattered mildly enlarged lymph nodes in the neck again noted, relatively stable since the prior study\par 4. Abnormal soft tissue within the orbits, not significantly changed\par 5. Stable nodular densities within the right upper lung\par 6. Cervical spondylosis with canal and foraminal stenosis at C4-C5. \par \par He underwent a biopsy of the mass on 10/26/18 by Dr. Clifton which showed invasive SCC nonkeratinizing, poorly differentiated, p16 positive, HPV negative. Flow cytometry also done, but results were inconclusive.\par \par PET/CT ([facility])11/12/18\par IMPRESSION: \par 1. Robust lobular hypermetabolic soft tissue mass involving the tongue base consistent with known primary tongue base malignancy. It extends to the level of the hyoid without obvious involvement of the preepiglottic fat. If warranted, consider dedicated MR imaging for further assessment.\par 2. Assessment for metastatic regional lymph nodes from primary tongue malignancy is limited in this patient with evidence of low-grade lymphomatous involvement in the neck. Asymmetrically hypermetabolic but stable appearing lymph nodes in the left level III lymph node would be the most concerning for potential regional ozzy disease from tongue base malignancy.\par 3. Multiple homogeneous lymph nodes associated with low-grade uptake especially in the neck and chest and to a lesser extent the abdomen and pelvis is likely related to patient's history of non-Hodgkin's lymphoma. Associated metabolic activity is consistent with a low-grade viability.\par 4. No definite evidence to suggest focal splenic or marrow involvement of disease. Low-grade diffuse involvement is not excluded.\par 5. Stable appearance of peribronchial nodular foci in the right upper lobe, too small to characterize, but favoring a post inflammatory/infectious process. Other benign type changes are seen in the mid to lower lung fields, which can be reassessed at follow-up imaging.\par 6. No suspicious findings to suggest marginal or regional ozzy recurrence of prostate malignancy or evidence of osteoblastic metastatic disease.\par \par Today, he reports he feels generally well with exception of difficulty swallowing solid foods since the biopsy. He has been eating soups and soft foods. He also reports xerostomia and continues to have a lisp. He is mildly fatigue, but it improves with rest. He continues to work as a . He denies fever, chills, CP, SOB, N/V/D, decreased appetite. He has lost approx 6- 7 pounds since the biopsy on 10/26. Of note, he reports he is claustrophobic, has needed anxiolytics prior to imaging in the past. \par \par His dentist is Dr. Hager ([phone]) and he last saw Dr. Hager in September. \par Family history notable for brother with leukemia. \par \par

## 2019-09-09 ENCOUNTER — APPOINTMENT (OUTPATIENT)
Dept: OTOLARYNGOLOGY | Facility: CLINIC | Age: 79
End: 2019-09-09
Payer: MEDICARE

## 2019-09-09 PROCEDURE — 31575 DIAGNOSTIC LARYNGOSCOPY: CPT

## 2019-09-16 NOTE — REVIEW OF SYSTEMS
[Negative] : Integumentary [Fatigue: Grade 1 - Fatigue relieved by rest] : Fatigue: Grade 1 - Fatigue relieved by rest

## 2019-09-16 NOTE — VITALS
[ECOG Performance Status: 2 - Ambulatory and capable of all self care but unable to carry out any work activities] : Performance Status: 2 - Ambulatory and capable of all self care but unable to carry out any work activities. Up and about more than 50% of waking hours [70: Cares for self; unalbe to carry on normal activity or do active work.] : 70: Cares for self; unable to carry on normal activity or do active work.

## 2019-09-16 NOTE — DISEASE MANAGEMENT
[Clinical] : TNM Stage: c [FreeTextEntry4] : IAE MALT lymphoma of the stomach [NTNM] : - [TTNM] : - [MTNM] : - [III] : III

## 2019-09-16 NOTE — PHYSICAL EXAM
[Normal Oral Cavity] : oral cavity was normal [Thin] : thin [Exaggerated Use Of Accessory Muscles For Inspiration] : no accessory muscle use [Respiration, Rhythm And Depth] : normal respiratory rhythm and effort [Abdomen Soft] : soft [Nondistended] : nondistended [Skin Color & Pigmentation] : normal skin color and pigmentation [Oriented To Time, Place, And Person] : oriented to person, place, and time

## 2019-09-16 NOTE — HISTORY OF PRESENT ILLNESS
[FreeTextEntry1] : Mr. Nathaniel Fuentes has completed radiation therapy to the oropharynx/ neck for a total of 7000 cGy over 35 fractions from 12/19/18 to 2/8/19. He did not have any unexpected treatment complications during the course of treatment. He received 5 out of 7 cycles of concurrent weekly Cisplatin with Dr. Blandon.  He has had a complicated post-CRT course detailed in prior notes and below.  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  He completed radiation therapy 2700 cGy / 3000 cGy to the stomach from 7/9/19 - 8/21/19.  Treatment was interrupted briefly (7/11-7/12/19) while he was admitted to [facility] for bleeding from the mouth.  He also elected to take a treatment from 7/29 - 8/7/19 due to personal/work obligations.  He also chose to terminate treatment early.  \par \par 9/17/19 - PTE\par Mr. Fuentes returns today for post-treatment evaluation.  He states he was admitted to [facility] on 8/23 with a GI bleed and hemoglobin of 4.3.  \par \par He completed his 15 day course of keflex.  He last saw Dr. Miles on 9/9/19.  He last saw Dr. Blandon on _____.  Today, he \par \par 6/13/19 - SNA\par Mr. Fuentes returns for SNA.  He was last seen here on 6/3/19 at which time he was found to have a deep BOT ulcer with heaped edges on clinical exam.  PET/CT and CT were also concerning for uptake in BOT.  He was given a course of diflucan, magic mouthwash, and advised to use baking soda and meat tenderizer oral rinses and restart gabapentin and protonix.  He was also to be evaluated by Dr. Clifton for possible biopsy - she has decided to re-evaluate before deciding on biopsy.  \par \par Upper Endoscopy was done at [facility] (Dr. Santamaria) for Melena on 5/22/19; report shows "diffuse severe mucoosal changes characterized by granularity, inflammation, and nodularity were found in the cardia, in the gastric fundus, and gastric body. 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 Pylori and focal granulation tissue and fibrinopurulent debris. [facility] Path review pending.  He saw Dr. Blandon on 6/11/19 and discussed chemo vs. RT.  H/H was 12.0/38.3 on 6/11 (after Procrit).  \par \par He is here today for consideration of radiation therapy.  He states that his right facial pain is unchanged.  He will complete the course of diflucan in 3 days and is using mouth rinses with the exception of magic mouthwash.  He was encouraged to start using it.  He notes that he is "here for another reason" today.  He denies GI complaints including abdominal pain, nausea, vomiting, constipation, diarrhea, melena, hematemesis.  He has not yet followed up with GI for colonoscopy.  \par \par 6/3/19 - Follow-up\par Mr. Fuentes presents today for routine follow-up.  He was last seen here on 4/2/19.  Plan at that time was for PET/CT and CT neck with contrast in 6 weeks, continue weekly visits with Dr. Blandon, continue Ensure 3 cans/day and increase calories, continue gabapentin (300mg AM, 600mg PM), stop trazodone, continue VNS and SLP and exercises.  \par \par PET/CT 5/16/19 - Impression: \par 1. Increased FDG uptake within the root of the tongue.  While some portion of this activity may be related to treatment and subsequent ulceration, the possibility of persistent disease is raised because of the intensity and distribution of the uptake.\par 2.  Scattered subcentimeter mediastinal and para-aortic lymph nodes. \par 3.  FDG uptake diffusely throughout the stomach and may represent infectious versus inflammatory gastritis.  Please note that a similar pattern was seen on the patient's prior PET scan dated 11/12/2018.\par \par CT neck with contrast 5/16/19 - Impression: There is now a large ulcer involving the left floor of mouth that is lined by heterogenously enhancing soft tissue that is FDG avid.  Findings are suspicious for disease persistence along the ulcer walls.  Correlation with visual inspection and possibly tissue sampling is recommended for further characterization.  Presumed lymphoproliferative disease in the orbits and cervical lymph nodes has nearly resolved.  \par \par He last saw Dr. Blandon one week ago.  Today, he reports that he has been "up and down" since we last saw him.  He states that approximately two weeks ago, he was experiencing bloody stool.  His wife called an ambulance and he was taken to the ED at [facility].  He states that he was found to be anemic with hemoglobin of 3 and received 3 blood transfusions.  He  underwent GI workup including upper endoscopy which showed bleeding ulcerations in the stomach; all pathology was benign.  He notes that he had been taking Aleve for pain prior to this episode and has since stopped.  He was discharged home 3 days later.  He was advised to take protonix daily and follow-up with GI.  He states that he has stopped protonix because he has not had any episodes of bleeding.  He did not keep his follow-up appointment with GI as he was not feeling well that day.  His wife states she plans to reschedule.  \par \par He notes continued left neck pain and edema relieved with warm compresses and tylenol.  He states that is has worsened over the past 2-3 weeks.  Of note, he also reports stopping gabapentin around that time because he didn't think it was helping and it is "just another thing."  He reports continued odynophagia which prevents him from eating solid food.  He is still on a liquid diet with ensure.  He states he lost 12 pounds during his recent hospitalization but has started gaining some weight again.  He reports that xerostomia has resolved.  He plans to follow-up with his PMD Dr. Mckeon this week and will also see Dr. Clifton today.  \par \par 4/2/19- FOLLOW UP\par Mr. Fuentes presents for follow up. When he was last seen on 3/19/19, he was volume depleted and continuing to report throat pain. Plan at the time was for labs with Dr. BOBBY, complete course of Diflucan, increase Ensure plus to 3 cans/ day, continue MMW and Gabapentin, stop Trazodone, continue VNS services, and follow up with SLP. \par \par He saw SLP on 3/28/19, at which time he recommended H&N exercises to improve swallow strength. Complains of irritation in throat but no pain. Stopped trazadone and finished course of diflucan. Reports more energy to walk and exercise. Able to tolerate 3 cans of ensure a day. No longer using MMW, says its effects don’t last long. Able to tolerate liquids and soft foods. Taste beginning to come back. Constipation persists. Seeing Dr. BOBBY today for procrit. Sees him weekly per wife. He decided to cancel his trip to [place] this week. \par \par 3/19/19 - FOLLOW UP\par Today Using MMW, Biotene, he reports throat pain still persist, not using gabapentin regularly, encouraged to use as ordered to get relief. His wife says she is now forcing him to use gabapentin and to use MMW regularly. She also made sure he started the fluconazole RX. Tolerating only liquids, soft foods like yoghurt, potatoes mashed, cream of wheat. Assessed by visiting nurse, will start PT and Sp/sw therapy next week. Carries out physical activity as tolerated, walks 4 blocks daily. Constipation is relieved by miralax.\par \par 3/5/19- Pt is here for a followup 3/5/19. Today he states he feels better, tolerates puree foods(warm cereal and soups), 2-3 Ensure shakes and whey protein recommended by Nutritionist. Increased PO fluids now to 2 Liters per day. Reports Painful throat while swallowing. Relieved by MMW and biotene. Denies any other pain. Visit with Dr Blandon today at 1pm, will review bloodwork after. ER Visit from Dr Blandon office on 2/26/19 for very low hemoglobin, received blood products, Iron and IVF. Says he is taking diflucan which will complete today (although we sent in 15 pills 1 week ago - wife will check # of pills at home). \par \par 2/26/19- PTE\par Mr. Fuentes returns for an emergent PTE. Several phone calls took place over the past few weeks after he completed RT regarding poor PO intake. He was Rx'd MMW. He continued to lose weight since completion of treatment. On the night of 2/18/18, he fell in the middle of the night (did not hit his head as per patient's wife) due to dizziness. He declined coming in last week for assessment/ labs; he and his wife reported improved PO intake. His wife Selma emailed over the weekend reporting another fall due to slipping on liquid on the floor. She stated he had increased his fluid intake and was doing quite well until the fall. They also called his PCP Dr. Mckeon to have the Trazodone prescription refilled as he had been having Restless Leg Syndrome most nights. Patient and his wife thought this was the cause of the second fall as he was quite stable in the day but became off balance in the night. He last saw Dr. Blandon on 2/15/19 and has a follow up scheduled for this Friday. \par \par Today, he reports he is eating and drinking better. However, he feels generally weak and also has lightheadedness with position changes. He admits that he did hit his head with both falls, but denies headaches, LOC, confusion. He has lost four pounds since last seen on 2/8, but it is an improvement since last week (he has gained 4 pounds since last week). He is drinking 2 Ensure plus per day. Also eating soft/ pureed foods, soups, broth. He reports sore throat that is improving; was 9/ 10 last week, but now 5/ 10. Using MMW, which is helpful. Also reports xerostomia, using Biotene but only once a day. Patient and wife inquired about Trazodone; he is taking 100 mg daily for restless leg syndrome as per his PCP, but it makes him feel "disoriented." \par \par ______________________________________________\par ONCOLOGIC HISTORY \par Mr. 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 Notably, he has PMH non-Hodgkin's lymphoma (patient unsure of which subtype), diagnosed in 1999, and treated by Dr. Shepherd with CHOP and Fludarabine. \par \par He was followed by Dr. Clifton (Head & Neck) and had complaints of swelling and pain over the left submandibular gland in 2017. A CT scan of the neck done 12/12/17 showed no evidence of pharyngeal mass, asymmetry or laryngeal mass. There were numerous cervical lymph nodes which were larger on the left, as well as enlarged subclavian and axillary lymph nodes. \par \par He continued to have complaints of left neck pain and had occasional metallic taste in mouth in July 2018. He also developed a lisp/ difficulty speaking in July 2018.\par \par He underwent CT neck on 10/15/18, which showed the following:\par IMPRESSION: \par 1. Soft tissue mass involving the left tongue base compatible with a tongue base neoplasm\par 2. Superficial nodule involving the right cheek which may represent an enlarged periparotid lymph node measuring 2.0 x 1.1 cm in axial cross-section, minimally increased in size since the prior study\par 3. Scattered mildly enlarged lymph nodes in the neck again noted, relatively stable since the prior study\par 4. Abnormal soft tissue within the orbits, not significantly changed\par 5. Stable nodular densities within the right upper lung\par 6. Cervical spondylosis with canal and foraminal stenosis at C4-C5. \par \par He underwent a biopsy of the mass on 10/26/18 by Dr. Clifton which showed invasive SCC nonkeratinizing, poorly differentiated, p16 positive, HPV negative. Flow cytometry also done, but results were inconclusive.\par \par PET/CT ([facility])11/12/18\par IMPRESSION: \par 1. Robust lobular hypermetabolic soft tissue mass involving the tongue base consistent with known primary tongue base malignancy. It extends to the level of the hyoid without obvious involvement of the preepiglottic fat. If warranted, consider dedicated MR imaging for further assessment.\par 2. Assessment for metastatic regional lymph nodes from primary tongue malignancy is limited in this patient with evidence of low-grade lymphomatous involvement in the neck. Asymmetrically hypermetabolic but stable appearing lymph nodes in the left level III lymph node would be the most concerning for potential regional ozzy disease from tongue base malignancy.\par 3. Multiple homogeneous lymph nodes associated with low-grade uptake especially in the neck and chest and to a lesser extent the abdomen and pelvis is likely related to patient's history of non-Hodgkin's lymphoma. Associated metabolic activity is consistent with a low-grade viability.\par 4. No definite evidence to suggest focal splenic or marrow involvement of disease. Low-grade diffuse involvement is not excluded.\par 5. Stable appearance of peribronchial nodular foci in the right upper lobe, too small to characterize, but favoring a post inflammatory/infectious process. Other benign type changes are seen in the mid to lower lung fields, which can be reassessed at follow-up imaging.\par 6. No suspicious findings to suggest marginal or regional ozzy recurrence of prostate malignancy or evidence of osteoblastic metastatic disease.\par \par Today, he reports he feels generally well with exception of difficulty swallowing solid foods since the biopsy. He has been eating soups and soft foods. He also reports xerostomia and continues to have a lisp. He is mildly fatigue, but it improves with rest. He continues to work as a . He denies fever, chills, CP, SOB, N/V/D, decreased appetite. He has lost approx 6- 7 pounds since the biopsy on 10/26. Of note, he reports he is claustrophobic, has needed anxiolytics prior to imaging in the past. \par \par His dentist is Dr. Hager ([phone]) and he last saw Dr. Hager in September. \par Family history notable for brother with leukemia. \par \par

## 2019-09-17 ENCOUNTER — APPOINTMENT (OUTPATIENT)
Dept: RADIATION ONCOLOGY | Facility: CLINIC | Age: 79
End: 2019-09-17

## 2019-09-17 ENCOUNTER — APPOINTMENT (OUTPATIENT)
Dept: OTOLARYNGOLOGY | Facility: CLINIC | Age: 79
End: 2019-09-17
Payer: MEDICARE

## 2019-09-17 PROCEDURE — 92610 EVALUATE SWALLOWING FUNCTION: CPT | Mod: GN

## 2019-09-17 PROCEDURE — 92526 ORAL FUNCTION THERAPY: CPT | Mod: GN

## 2019-09-18 NOTE — PHYSICAL EXAM
[Thin] : thin [Normal Oral Cavity] : oral cavity was normal [Exaggerated Use Of Accessory Muscles For Inspiration] : no accessory muscle use [Respiration, Rhythm And Depth] : normal respiratory rhythm and effort [Abdomen Soft] : soft [Nondistended] : nondistended [Oriented To Time, Place, And Person] : oriented to person, place, and time [Skin Color & Pigmentation] : normal skin color and pigmentation

## 2019-09-18 NOTE — PHYSICAL EXAM
[Thin] : thin [Normal Oral Cavity] : oral cavity was normal [Abdomen Soft] : soft [Respiration, Rhythm And Depth] : normal respiratory rhythm and effort [Exaggerated Use Of Accessory Muscles For Inspiration] : no accessory muscle use [Nondistended] : nondistended [Oriented To Time, Place, And Person] : oriented to person, place, and time [Skin Color & Pigmentation] : normal skin color and pigmentation

## 2019-09-18 NOTE — DISEASE MANAGEMENT
[Clinical] : TNM Stage: c [FreeTextEntry4] : IAE MALT lymphoma of the stomach [TTNM] : - [NTNM] : - [MTNM] : - [III] : III

## 2019-09-18 NOTE — REASON FOR VISIT
[Post-Treatment Evaluation] : post-treatment evaluation for [Routine On-Treatment] : a routine on-treatment visit for [Other: ___] : [unfilled] [Spouse] : spouse

## 2019-09-18 NOTE — HISTORY OF PRESENT ILLNESS
[FreeTextEntry1] : Mr. Nathaniel Fuentes has completed radiation therapy to the oropharynx/ neck for a total of 7000 cGy over 35 fractions from 12/19/18 to 2/8/19. He did not have any unexpected treatment complications during the course of treatment. He received 5 out of 7 cycles of concurrent weekly Cisplatin with Dr. Blandon.  He has had a complicated post-CRT course detailed in prior notes and below.  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  Treatment was interrupted briefly (7/11-7/12/19) while he was admitted to [facility] for bleeding from the mouth. He elected to take a treatment from 7/29 - 8/7/19 due to personal/work obligations. He also chose to terminate treatment early. \par \par 9/17/19 - PTE\par Mr. Fuentes returns today for post-treatment evaluation. He was admitted to [facility] on 8/23 with a GI bleed and hemoglobin of 4.3. \par \par He completed his 15 day course of keflex. He last saw Dr. Miles on 9/9/19. He last saw Dr. Blandon on _____. Today, he \par \par 8/19/19 - OTV - Mr. Fuentes has completed 2550 cGy / 3000 cGy to the stomach.  Today, he notes fatigue.  Denies nausea, vomiting, constipation diarrhea and skin irritation.  \par \par 8/14/19 - OTV - Mr. Fuentes has completed 2100 cGy / 3000 cGy to the stomach.  He elected not to received treatment from 7/29 - 8/7.  Today, he reports feeling exhausted.  He wants to go home and is refusing vital signs/exam.  Agreed only to skin check.  Denies abdominal pain, nausea, vomiting, constipation, diarrhea. \par \par 7/20/19 - OTV - Mr. Fuentes has completed 1500 cGy / 3000 cGy to the stomach.  He expresses unhappiness and frustration regarding the results of his biopsy last week.  He states is now considering not completing his current treatment to the stomach.  He states his pain has improved, no longer requiring oxycodone.  He  denies GI symptoms.  He states he is "not really" interested in a feeding tube at this point.  He is drinking ensure.  He states he weighed himself at 138 lbs this morning.  Unfortunately the in OR endo done by Dr. Miles noted a large necrotic ulcerated mass in the left hypopharynx representing necrosis of the hyoid bone and soft tissue fo the hypopharynx.  fortunately all pathology samples were negative for tumor. \par \par 7/22/19 - OTV - Mr. Fuentes has completed 1050 cGy / 3000 cGy to the stomach.  Today, he denies complaints.  He denies abdominal pain, nausea, vomiting, diarrhea, melena.  He notes chronic constipation, unchanged.  He states he is scheduled for laryngoscopy and biopsy with Dr. Miles tomorrow.  \par \par 7/15/19 - OTV - Mr. Fuentes has completed 300 cGy / 3000 cGy to the stomach.  He was admitted to [facility] on Thursday for bleeding from his mouth.  He states that he spit up approximately 1 cup of blood and his wife took him to the ED.  He states he underwent a CT scan, CT angio and several laryngoscopies.  He was told there is concern for recurrence of his base of tongue cancer.  He saw Dr. Phan Jackman at [facility] in follow-up on Friday and is scheduled to see Dr. Miles for second opinion tomorrow.  He states he is emotionally overwhelmed and exhausted.  He denies any further episodes of bleeding as well as GI complaints to include nausea, vomiting, constipation and diarrhea.  He did not take his prophylactic zofran today, and was therefore given a dose of 8mg in clinic today.  He will receive treatment following this visit. \par \par \par \par \par 7/8/19 - Follow-up - Mr. Fuentes was scheduled to begin RT to the stomach today.  He did not take his prophylactic zofran this morning, so declined treatment today.  He has opted to begin treatment tomorrow.  He and his wife present to clinic today for follow-up of his left base of tongue ulcer.  They state they have been seeing Dr. Clifton recently, and report that she has been happy with the progress of the ulcer.  He continues to have neck pain, which has improved significantly with oxycodone.  They plan to get a second opinion from Dr. Miles regarding his ongoing neck pain.  They also have some questions about his medications.  He points to his pain as bilateral and essentially overlying the hyoid bone.   he is able to swallow.   taste is poot\par \par 6/13/19 - SNA\par Mr. Fuentes returns for SNA.  He was last seen here on 6/3/19 at which time he was found to have a deep BOT ulcer with heaped edges on clinical exam.  PET/CT and CT were also concerning for uptake in BOT.  He was given a course of diflucan, magic mouthwash, and advised to use baking soda and meat tenderizer oral rinses and restart gabapentin and protonix.  He was also to be evaluated by Dr. Clifton for possible biopsy - she has decided to re-evaluate before deciding on biopsy.  \par \par Upper Endoscopy was done at [facility] (Dr. Santamaria) for Melena on 5/22/19; report shows "diffuse severe mucoosal changes characterized by granularity, inflammation, and nodularity were found in the cardia, in the gastric fundus, and gastric body. 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 Pylori and focal granulation tissue and fibrinopurulent debris. [facility] Path review pending.  He saw Dr. Blandon on 6/11/19 and discussed chemo vs. RT.  H/H was 12.0/38.3 on 6/11 (after Procrit).  \par \par He is here today for consideration of radiation therapy.  He states that his right facial pain is unchanged.  He will complete the course of diflucan in 3 days and is using mouth rinses with the exception of magic mouthwash.  He was encouraged to start using it.  He notes that he is "here for another reason" today.  He denies GI complaints including abdominal pain, nausea, vomiting, constipation, diarrhea, melena, hematemesis.  He has not yet followed up with GI for colonoscopy.  \par \par 6/3/19 - Follow-up\par Mr. Fuentes presents today for routine follow-up.  He was last seen here on 4/2/19.  Plan at that time was for PET/CT and CT neck with contrast in 6 weeks, continue weekly visits with Dr. Blandon, continue Ensure 3 cans/day and increase calories, continue gabapentin (300mg AM, 600mg PM), stop trazodone, continue VNS and SLP and exercises.  \par \par PET/CT 5/16/19 - Impression: \par 1. Increased FDG uptake within the root of the tongue.  While some portion of this activity may be related to treatment and subsequent ulceration, the possibility of persistent disease is raised because of the intensity and distribution of the uptake.\par 2.  Scattered subcentimeter mediastinal and para-aortic lymph nodes. \par 3.  FDG uptake diffusely throughout the stomach and may represent infectious versus inflammatory gastritis.  Please note that a similar pattern was seen on the patient's prior PET scan dated 11/12/2018.\par \par CT neck with contrast 5/16/19 - Impression: There is now a large ulcer involving the left floor of mouth that is lined by heterogenously enhancing soft tissue that is FDG avid.  Findings are suspicious for disease persistence along the ulcer walls.  Correlation with visual inspection and possibly tissue sampling is recommended for further characterization.  Presumed lymphoproliferative disease in the orbits and cervical lymph nodes has nearly resolved.  \par \par He last saw Dr. Blandon one week ago.  Today, he reports that he has been "up and down" since we last saw him.  He states that approximately two weeks ago, he was experiencing bloody stool.  His wife called an ambulance and he was taken to the ED at [facility].  He states that he was found to be anemic with hemoglobin of 3 and received 3 blood transfusions.  He  underwent GI workup including upper endoscopy which showed bleeding ulcerations in the stomach; all pathology was benign.  He notes that he had been taking Aleve for pain prior to this episode and has since stopped.  He was discharged home 3 days later.  He was advised to take protonix daily and follow-up with GI.  He states that he has stopped protonix because he has not had any episodes of bleeding.  He did not keep his follow-up appointment with GI as he was not feeling well that day.  His wife states she plans to reschedule.  \par \par He notes continued left neck pain and edema relieved with warm compresses and tylenol.  He states that is has worsened over the past 2-3 weeks.  Of note, he also reports stopping gabapentin around that time because he didn't think it was helping and it is "just another thing."  He reports continued odynophagia which prevents him from eating solid food.  He is still on a liquid diet with ensure.  He states he lost 12 pounds during his recent hospitalization but has started gaining some weight again.  He reports that xerostomia has resolved.  He plans to follow-up with his PMD Dr. Mckeon this week and will also see Dr. Clifton today.  \par \par 4/2/19- FOLLOW UP\par Mr. Fuentes presents for follow up. When he was last seen on 3/19/19, he was volume depleted and continuing to report throat pain. Plan at the time was for labs with Dr. BOBBY, complete course of Diflucan, increase Ensure plus to 3 cans/ day, continue MMW and Gabapentin, stop Trazodone, continue VNS services, and follow up with SLP. \par \par He saw SLP on 3/28/19, at which time he recommended H&N exercises to improve swallow strength. Complains of irritation in throat but no pain. Stopped trazadone and finished course of diflucan. Reports more energy to walk and exercise. Able to tolerate 3 cans of ensure a day. No longer using MMW, says its effects don’t last long. Able to tolerate liquids and soft foods. Taste beginning to come back. Constipation persists. Seeing Dr. BOBBY today for eleno. Sees him weekly per wife. He decided to cancel his trip to [place] this week. \par \par 3/19/19 - FOLLOW UP\par Today Using MMW, Biotene, he reports throat pain still persist, not using gabapentin regularly, encouraged to use as ordered to get relief. His wife says she is now forcing him to use gabapentin and to use MMW regularly. She also made sure he started the fluconazole RX. Tolerating only liquids, soft foods like yoghurt, potatoes mashed, cream of wheat. Assessed by visiting nurse, will start PT and Sp/sw therapy next week. Carries out physical activity as tolerated, walks 4 blocks daily. Constipation is relieved by miralax.\par \par 3/5/19- Pt is here for a followup 3/5/19. Today he states he feels better, tolerates puree foods(warm cereal and soups), 2-3 Ensure shakes and whey protein recommended by Nutritionist. Increased PO fluids now to 2 Liters per day. Reports Painful throat while swallowing. Relieved by MMW and biotene. Denies any other pain. Visit with Dr Blandon today at 1pm, will review bloodwork after. ER Visit from Dr Blandon office on 2/26/19 for very low hemoglobin, received blood products, Iron and IVF. Says he is taking diflucan which will complete today (although we sent in 15 pills 1 week ago - wife will check # of pills at home). \par \par 2/26/19- PTE\par Mr. Fuentes returns for an emergent PTE. Several phone calls took place over the past few weeks after he completed RT regarding poor PO intake. He was Rx'd MMW. He continued to lose weight since completion of treatment. On the night of 2/18/18, he fell in the middle of the night (did not hit his head as per patient's wife) due to dizziness. He declined coming in last week for assessment/ labs; he and his wife reported improved PO intake. His wife Selma emailed over the weekend reporting another fall due to slipping on liquid on the floor. She stated he had increased his fluid intake and was doing quite well until the fall. They also called his PCP Dr. Mckeon to have the Trazodone prescription refilled as he had been having Restless Leg Syndrome most nights. Patient and his wife thought this was the cause of the second fall as he was quite stable in the day but became off balance in the night. He last saw Dr. Blandon on 2/15/19 and has a follow up scheduled for this Friday. \par \par Today, he reports he is eating and drinking better. However, he feels generally weak and also has lightheadedness with position changes. He admits that he did hit his head with both falls, but denies headaches, LOC, confusion. He has lost four pounds since last seen on 2/8, but it is an improvement since last week (he has gained 4 pounds since last week). He is drinking 2 Ensure plus per day. Also eating soft/ pureed foods, soups, broth. He reports sore throat that is improving; was 9/ 10 last week, but now 5/ 10. Using MMW, which is helpful. Also reports xerostomia, using Biotene but only once a day. Patient and wife inquired about Trazodone; he is taking 100 mg daily for restless leg syndrome as per his PCP, but it makes him feel "disoriented." \par \par ______________________________________________\par ONCOLOGIC HISTORY \par Mr. 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 Notably, he has PMH non-Hodgkin's lymphoma (patient unsure of which subtype), diagnosed in 1999, and treated by Dr. Shepherd with CHOP and Fludarabine. \par \par He was followed by Dr. Clifton (Head & Neck) and had complaints of swelling and pain over the left submandibular gland in 2017. A CT scan of the neck done 12/12/17 showed no evidence of pharyngeal mass, asymmetry or laryngeal mass. There were numerous cervical lymph nodes which were larger on the left, as well as enlarged subclavian and axillary lymph nodes. \par \par He continued to have complaints of left neck pain and had occasional metallic taste in mouth in July 2018. He also developed a lisp/ difficulty speaking in July 2018.\par \par He underwent CT neck on 10/15/18, which showed the following:\par IMPRESSION: \par 1. Soft tissue mass involving the left tongue base compatible with a tongue base neoplasm\par 2. Superficial nodule involving the right cheek which may represent an enlarged periparotid lymph node measuring 2.0 x 1.1 cm in axial cross-section, minimally increased in size since the prior study\par 3. Scattered mildly enlarged lymph nodes in the neck again noted, relatively stable since the prior study\par 4. Abnormal soft tissue within the orbits, not significantly changed\par 5. Stable nodular densities within the right upper lung\par 6. Cervical spondylosis with canal and foraminal stenosis at C4-C5. \par \par He underwent a biopsy of the mass on 10/26/18 by Dr. Clifton which showed invasive SCC nonkeratinizing, poorly differentiated, p16 positive, HPV negative. Flow cytometry also done, but results were inconclusive.\par \par PET/CT ([facility])11/12/18\par IMPRESSION: \par 1. Robust lobular hypermetabolic soft tissue mass involving the tongue base consistent with known primary tongue base malignancy. It extends to the level of the hyoid without obvious involvement of the preepiglottic fat. If warranted, consider dedicated MR imaging for further assessment.\par 2. Assessment for metastatic regional lymph nodes from primary tongue malignancy is limited in this patient with evidence of low-grade lymphomatous involvement in the neck. Asymmetrically hypermetabolic but stable appearing lymph nodes in the left level III lymph node would be the most concerning for potential regional ozzy disease from tongue base malignancy.\par 3. Multiple homogeneous lymph nodes associated with low-grade uptake especially in the neck and chest and to a lesser extent the abdomen and pelvis is likely related to patient's history of non-Hodgkin's lymphoma. Associated metabolic activity is consistent with a low-grade viability.\par 4. No definite evidence to suggest focal splenic or marrow involvement of disease. Low-grade diffuse involvement is not excluded.\par 5. Stable appearance of peribronchial nodular foci in the right upper lobe, too small to characterize, but favoring a post inflammatory/infectious process. Other benign type changes are seen in the mid to lower lung fields, which can be reassessed at follow-up imaging.\par 6. No suspicious findings to suggest marginal or regional ozzy recurrence of prostate malignancy or evidence of osteoblastic metastatic disease.\par \par Today, he reports he feels generally well with exception of difficulty swallowing solid foods since the biopsy. He has been eating soups and soft foods. He also reports xerostomia and continues to have a lisp. He is mildly fatigue, but it improves with rest. He continues to work as a . He denies fever, chills, CP, SOB, N/V/D, decreased appetite. He has lost approx 6- 7 pounds since the biopsy on 10/26. Of note, he reports he is claustrophobic, has needed anxiolytics prior to imaging in the past. \par \par His dentist is Dr. Hager ([phone]) and he last saw Dr. Hager in September. \par Family history notable for brother with leukemia. \par \par

## 2019-09-18 NOTE — HISTORY OF PRESENT ILLNESS
[FreeTextEntry1] : Mr. Nathaniel Fuentes has completed radiation therapy to the oropharynx/ neck for a total of 7000 cGy over 35 fractions from 12/19/18 to 2/8/19. He did not have any unexpected treatment complications during the course of treatment. He received 5 out of 7 cycles of concurrent weekly Cisplatin with Dr. Blandon.  He has had a complicated post-CRT course detailed in prior notes and below.  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  Treatment was interrupted briefly (7/11-7/12/19) while he was admitted to [facility] for bleeding from the mouth. He elected to take a treatment from 7/29 - 8/7/19 due to personal/work obligations. He also chose to terminate treatment early. \par \par 9/17/19 - PTE\par Mr. Fuentes returns today for post-treatment evaluation. He was admitted to [facility] on 8/23 with a GI bleed and hemoglobin of 4.3. \par \par He completed his 15 day course of keflex. He last saw Dr. Miles on 9/9/19. He last saw Dr. Blandon on _____. Today, he \par \par 8/19/19 - OTV - Mr. Fuentes has completed 2550 cGy / 3000 cGy to the stomach.  Today, he notes fatigue.  Denies nausea, vomiting, constipation diarrhea and skin irritation.  \par \par 8/14/19 - OTV - Mr. Fuentes has completed 2100 cGy / 3000 cGy to the stomach.  He elected not to received treatment from 7/29 - 8/7.  Today, he reports feeling exhausted.  He wants to go home and is refusing vital signs/exam.  Agreed only to skin check.  Denies abdominal pain, nausea, vomiting, constipation, diarrhea. \par \par 7/20/19 - OTV - Mr. Fuentes has completed 1500 cGy / 3000 cGy to the stomach.  He expresses unhappiness and frustration regarding the results of his biopsy last week.  He states is now considering not completing his current treatment to the stomach.  He states his pain has improved, no longer requiring oxycodone.  He  denies GI symptoms.  He states he is "not really" interested in a feeding tube at this point.  He is drinking ensure.  He states he weighed himself at 138 lbs this morning.  Unfortunately the in OR endo done by Dr. Miles noted a large necrotic ulcerated mass in the left hypopharynx representing necrosis of the hyoid bone and soft tissue fo the hypopharynx.  fortunately all pathology samples were negative for tumor. \par \par 7/22/19 - OTV - Mr. Fuentes has completed 1050 cGy / 3000 cGy to the stomach.  Today, he denies complaints.  He denies abdominal pain, nausea, vomiting, diarrhea, melena.  He notes chronic constipation, unchanged.  He states he is scheduled for laryngoscopy and biopsy with Dr. Miles tomorrow.  \par \par 7/15/19 - OTV - Mr. Fuentes has completed 300 cGy / 3000 cGy to the stomach.  He was admitted to [facility] on Thursday for bleeding from his mouth.  He states that he spit up approximately 1 cup of blood and his wife took him to the ED.  He states he underwent a CT scan, CT angio and several laryngoscopies.  He was told there is concern for recurrence of his base of tongue cancer.  He saw Dr. Phan Jackman at [facility] in follow-up on Friday and is scheduled to see Dr. Miles for second opinion tomorrow.  He states he is emotionally overwhelmed and exhausted.  He denies any further episodes of bleeding as well as GI complaints to include nausea, vomiting, constipation and diarrhea.  He did not take his prophylactic zofran today, and was therefore given a dose of 8mg in clinic today.  He will receive treatment following this visit. \par \par \par \par \par 7/8/19 - Follow-up - Mr. Fuentes was scheduled to begin RT to the stomach today.  He did not take his prophylactic zofran this morning, so declined treatment today.  He has opted to begin treatment tomorrow.  He and his wife present to clinic today for follow-up of his left base of tongue ulcer.  They state they have been seeing Dr. Clifton recently, and report that she has been happy with the progress of the ulcer.  He continues to have neck pain, which has improved significantly with oxycodone.  They plan to get a second opinion from Dr. Miles regarding his ongoing neck pain.  They also have some questions about his medications.  He points to his pain as bilateral and essentially overlying the hyoid bone.   he is able to swallow.   taste is poot\par \par 6/13/19 - SNA\par Mr. Fuentes returns for SNA.  He was last seen here on 6/3/19 at which time he was found to have a deep BOT ulcer with heaped edges on clinical exam.  PET/CT and CT were also concerning for uptake in BOT.  He was given a course of diflucan, magic mouthwash, and advised to use baking soda and meat tenderizer oral rinses and restart gabapentin and protonix.  He was also to be evaluated by Dr. Clifton for possible biopsy - she has decided to re-evaluate before deciding on biopsy.  \par \par Upper Endoscopy was done at [facility] (Dr. Santamaria) for Melena on 5/22/19; report shows "diffuse severe mucoosal changes characterized by granularity, inflammation, and nodularity were found in the cardia, in the gastric fundus, and gastric body. 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 Pylori and focal granulation tissue and fibrinopurulent debris. [facility] Path review pending.  He saw Dr. Blandon on 6/11/19 and discussed chemo vs. RT.  H/H was 12.0/38.3 on 6/11 (after Procrit).  \par \par He is here today for consideration of radiation therapy.  He states that his right facial pain is unchanged.  He will complete the course of diflucan in 3 days and is using mouth rinses with the exception of magic mouthwash.  He was encouraged to start using it.  He notes that he is "here for another reason" today.  He denies GI complaints including abdominal pain, nausea, vomiting, constipation, diarrhea, melena, hematemesis.  He has not yet followed up with GI for colonoscopy.  \par \par 6/3/19 - Follow-up\par Mr. Fuentes presents today for routine follow-up.  He was last seen here on 4/2/19.  Plan at that time was for PET/CT and CT neck with contrast in 6 weeks, continue weekly visits with Dr. Blandon, continue Ensure 3 cans/day and increase calories, continue gabapentin (300mg AM, 600mg PM), stop trazodone, continue VNS and SLP and exercises.  \par \par PET/CT 5/16/19 - Impression: \par 1. Increased FDG uptake within the root of the tongue.  While some portion of this activity may be related to treatment and subsequent ulceration, the possibility of persistent disease is raised because of the intensity and distribution of the uptake.\par 2.  Scattered subcentimeter mediastinal and para-aortic lymph nodes. \par 3.  FDG uptake diffusely throughout the stomach and may represent infectious versus inflammatory gastritis.  Please note that a similar pattern was seen on the patient's prior PET scan dated 11/12/2018.\par \par CT neck with contrast 5/16/19 - Impression: There is now a large ulcer involving the left floor of mouth that is lined by heterogenously enhancing soft tissue that is FDG avid.  Findings are suspicious for disease persistence along the ulcer walls.  Correlation with visual inspection and possibly tissue sampling is recommended for further characterization.  Presumed lymphoproliferative disease in the orbits and cervical lymph nodes has nearly resolved.  \par \par He last saw Dr. Blandon one week ago.  Today, he reports that he has been "up and down" since we last saw him.  He states that approximately two weeks ago, he was experiencing bloody stool.  His wife called an ambulance and he was taken to the ED at [facility].  He states that he was found to be anemic with hemoglobin of 3 and received 3 blood transfusions.  He  underwent GI workup including upper endoscopy which showed bleeding ulcerations in the stomach; all pathology was benign.  He notes that he had been taking Aleve for pain prior to this episode and has since stopped.  He was discharged home 3 days later.  He was advised to take protonix daily and follow-up with GI.  He states that he has stopped protonix because he has not had any episodes of bleeding.  He did not keep his follow-up appointment with GI as he was not feeling well that day.  His wife states she plans to reschedule.  \par \par He notes continued left neck pain and edema relieved with warm compresses and tylenol.  He states that is has worsened over the past 2-3 weeks.  Of note, he also reports stopping gabapentin around that time because he didn't think it was helping and it is "just another thing."  He reports continued odynophagia which prevents him from eating solid food.  He is still on a liquid diet with ensure.  He states he lost 12 pounds during his recent hospitalization but has started gaining some weight again.  He reports that xerostomia has resolved.  He plans to follow-up with his PMD Dr. Mckeon this week and will also see Dr. Clifton today.  \par \par 4/2/19- FOLLOW UP\par Mr. Fuentes presents for follow up. When he was last seen on 3/19/19, he was volume depleted and continuing to report throat pain. Plan at the time was for labs with Dr. BOBBY, complete course of Diflucan, increase Ensure plus to 3 cans/ day, continue MMW and Gabapentin, stop Trazodone, continue VNS services, and follow up with SLP. \par \par He saw SLP on 3/28/19, at which time he recommended H&N exercises to improve swallow strength. Complains of irritation in throat but no pain. Stopped trazadone and finished course of diflucan. Reports more energy to walk and exercise. Able to tolerate 3 cans of ensure a day. No longer using MMW, says its effects don’t last long. Able to tolerate liquids and soft foods. Taste beginning to come back. Constipation persists. Seeing Dr. BOBBY today for eleno. Sees him weekly per wife. He decided to cancel his trip to [place] this week. \par \par 3/19/19 - FOLLOW UP\par Today Using MMW, Biotene, he reports throat pain still persist, not using gabapentin regularly, encouraged to use as ordered to get relief. His wife says she is now forcing him to use gabapentin and to use MMW regularly. She also made sure he started the fluconazole RX. Tolerating only liquids, soft foods like yoghurt, potatoes mashed, cream of wheat. Assessed by visiting nurse, will start PT and Sp/sw therapy next week. Carries out physical activity as tolerated, walks 4 blocks daily. Constipation is relieved by miralax.\par \par 3/5/19- Pt is here for a followup 3/5/19. Today he states he feels better, tolerates puree foods(warm cereal and soups), 2-3 Ensure shakes and whey protein recommended by Nutritionist. Increased PO fluids now to 2 Liters per day. Reports Painful throat while swallowing. Relieved by MMW and biotene. Denies any other pain. Visit with Dr Blandon today at 1pm, will review bloodwork after. ER Visit from Dr Blandon office on 2/26/19 for very low hemoglobin, received blood products, Iron and IVF. Says he is taking diflucan which will complete today (although we sent in 15 pills 1 week ago - wife will check # of pills at home). \par \par 2/26/19- PTE\par Mr. Fuentes returns for an emergent PTE. Several phone calls took place over the past few weeks after he completed RT regarding poor PO intake. He was Rx'd MMW. He continued to lose weight since completion of treatment. On the night of 2/18/18, he fell in the middle of the night (did not hit his head as per patient's wife) due to dizziness. He declined coming in last week for assessment/ labs; he and his wife reported improved PO intake. His wife Selma emailed over the weekend reporting another fall due to slipping on liquid on the floor. She stated he had increased his fluid intake and was doing quite well until the fall. They also called his PCP Dr. Mckeon to have the Trazodone prescription refilled as he had been having Restless Leg Syndrome most nights. Patient and his wife thought this was the cause of the second fall as he was quite stable in the day but became off balance in the night. He last saw Dr. Blandon on 2/15/19 and has a follow up scheduled for this Friday. \par \par Today, he reports he is eating and drinking better. However, he feels generally weak and also has lightheadedness with position changes. He admits that he did hit his head with both falls, but denies headaches, LOC, confusion. He has lost four pounds since last seen on 2/8, but it is an improvement since last week (he has gained 4 pounds since last week). He is drinking 2 Ensure plus per day. Also eating soft/ pureed foods, soups, broth. He reports sore throat that is improving; was 9/ 10 last week, but now 5/ 10. Using MMW, which is helpful. Also reports xerostomia, using Biotene but only once a day. Patient and wife inquired about Trazodone; he is taking 100 mg daily for restless leg syndrome as per his PCP, but it makes him feel "disoriented." \par \par ______________________________________________\par ONCOLOGIC HISTORY \par Mr. 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 Notably, he has PMH non-Hodgkin's lymphoma (patient unsure of which subtype), diagnosed in 1999, and treated by Dr. Shepherd with CHOP and Fludarabine. \par \par He was followed by Dr. Clifton ([facility] Neck) and had complaints of swelling and pain over the left submandibular gland in 2017. A CT scan of the neck done 12/12/17 showed no evidence of pharyngeal mass, asymmetry or laryngeal mass. There were numerous cervical lymph nodes which were larger on the left, as well as enlarged subclavian and axillary lymph nodes. \par \par He continued to have complaints of left neck pain and had occasional metallic taste in mouth in July 2018. He also developed a lisp/ difficulty speaking in July 2018.\par \par He underwent CT neck on 10/15/18, which showed the following:\par IMPRESSION: \par 1. Soft tissue mass involving the left tongue base compatible with a tongue base neoplasm\par 2. Superficial nodule involving the right cheek which may represent an enlarged periparotid lymph node measuring 2.0 x 1.1 cm in axial cross-section, minimally increased in size since the prior study\par 3. Scattered mildly enlarged lymph nodes in the neck again noted, relatively stable since the prior study\par 4. Abnormal soft tissue within the orbits, not significantly changed\par 5. Stable nodular densities within the right upper lung\par 6. Cervical spondylosis with canal and foraminal stenosis at C4-C5. \par \par He underwent a biopsy of the mass on 10/26/18 by Dr. Clifton which showed invasive SCC nonkeratinizing, poorly differentiated, p16 positive, HPV negative. Flow cytometry also done, but results were inconclusive.\par \par PET/CT ([facility])11/12/18\par IMPRESSION: \par 1. Robust lobular hypermetabolic soft tissue mass involving the tongue base consistent with known primary tongue base malignancy. It extends to the level of the hyoid without obvious involvement of the preepiglottic fat. If warranted, consider dedicated MR imaging for further assessment.\par 2. Assessment for metastatic regional lymph nodes from primary tongue malignancy is limited in this patient with evidence of low-grade lymphomatous involvement in the neck. Asymmetrically hypermetabolic but stable appearing lymph nodes in the left level III lymph node would be the most concerning for potential regional ozzy disease from tongue base malignancy.\par 3. Multiple homogeneous lymph nodes associated with low-grade uptake especially in the neck and chest and to a lesser extent the abdomen and pelvis is likely related to patient's history of non-Hodgkin's lymphoma. Associated metabolic activity is consistent with a low-grade viability.\par 4. No definite evidence to suggest focal splenic or marrow involvement of disease. Low-grade diffuse involvement is not excluded.\par 5. Stable appearance of peribronchial nodular foci in the right upper lobe, too small to characterize, but favoring a post inflammatory/infectious process. Other benign type changes are seen in the mid to lower lung fields, which can be reassessed at follow-up imaging.\par 6. No suspicious findings to suggest marginal or regional ozzy recurrence of prostate malignancy or evidence of osteoblastic metastatic disease.\par \par Today, he reports he feels generally well with exception of difficulty swallowing solid foods since the biopsy. He has been eating soups and soft foods. He also reports xerostomia and continues to have a lisp. He is mildly fatigue, but it improves with rest. He continues to work as a . He denies fever, chills, CP, SOB, N/V/D, decreased appetite. He has lost approx 6- 7 pounds since the biopsy on 10/26. Of note, he reports he is claustrophobic, has needed anxiolytics prior to imaging in the past. \par \par His dentist is Dr. Hager ([phone]) and he last saw Dr. Hager in September. \par Family history notable for brother with leukemia. \par \par

## 2019-09-23 ENCOUNTER — APPOINTMENT (OUTPATIENT)
Dept: RADIATION ONCOLOGY | Facility: CLINIC | Age: 79
End: 2019-09-23
Payer: MEDICARE

## 2019-09-25 ENCOUNTER — APPOINTMENT (OUTPATIENT)
Dept: RADIATION ONCOLOGY | Facility: CLINIC | Age: 79
End: 2019-09-25
Payer: MEDICARE

## 2019-09-25 VITALS
WEIGHT: 140.7 LBS | DIASTOLIC BLOOD PRESSURE: 62 MMHG | BODY MASS INDEX: 20.19 KG/M2 | SYSTOLIC BLOOD PRESSURE: 103 MMHG | OXYGEN SATURATION: 100 % | HEART RATE: 86 BPM | RESPIRATION RATE: 16 BRPM

## 2019-09-25 PROCEDURE — 99024 POSTOP FOLLOW-UP VISIT: CPT

## 2019-09-25 RX ORDER — CEPHALEXIN 500 MG/1
500 CAPSULE ORAL 4 TIMES DAILY
Qty: 21 | Refills: 2 | Status: COMPLETED | COMMUNITY
Start: 2019-08-12 | End: 2019-09-25

## 2019-09-25 NOTE — PHYSICAL EXAM
[Thin] : thin [Normal Oral Cavity] : oral cavity was normal [Respiration, Rhythm And Depth] : normal respiratory rhythm and effort [Exaggerated Use Of Accessory Muscles For Inspiration] : no accessory muscle use [Abdomen Soft] : soft [Skin Color & Pigmentation] : normal skin color and pigmentation [Nondistended] : nondistended [Oriented To Time, Place, And Person] : oriented to person, place, and time [Normal] : no respiratory distress, lungs were clear to auscultation bilaterally [Heart Rate And Rhythm] : heart rate and rhythm were normal [Heart Sounds] : normal S1 and S2 [de-identified] : ECOG 1 [de-identified] : no palpable nodes in the neck.  there is modest submental edema.  there are no suspicious findings int he oral cavity.   He was just scoped by Dr. Ford.

## 2019-09-25 NOTE — REVIEW OF SYSTEMS
[Negative] : Integumentary [Fatigue: Grade 1 - Fatigue relieved by rest] : Fatigue: Grade 1 - Fatigue relieved by rest [FreeTextEntry4] : see HPI [FreeTextEntry7] : see HPI

## 2019-09-25 NOTE — DISEASE MANAGEMENT
[Clinical] : TNM Stage: c [III] : III [FreeTextEntry4] : IAE MALT lymphoma of the stomach [TTNM] : - [NTNM] : - [MTNM] : -

## 2019-09-25 NOTE — HISTORY OF PRESENT ILLNESS
Refill request received from patient. Last office visit was 1/30/19. He can be reached at [phone]. [FreeTextEntry1] : Mr. Nathaniel Fuentes has completed radiation therapy to the oropharynx/ neck for a total of 7000 cGy over 35 fractions from 12/19/18 to 2/8/19. He did not have any unexpected treatment complications during the course of treatment. He received 5 out of 7 cycles of concurrent weekly Cisplatin with Dr. Blandon.  He has had a complicated post-CRT course detailed in prior notes and below.  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  He completed radiation therapy 2700 cGy / 3000 cGy to the stomach from 7/9/19 - 8/21/19. Treatment was interrupted briefly (7/11-7/12/19) while he was admitted to [facility] for bleeding from the mouth. He also elected to take a treatment from 7/29 - 8/7/19 due to personal/work obligations. He also chose to terminate treatment early. \par \par 9/25/19 - PTE\par Mr. Fuentes returns today for post-treatment evaluation. He states he was admitted to [facility] on 8/23 with a GI bleed and hemoglobin of 4.3.  He and his wife state that he underwent EGD and was found to have a large bleeding gastric ulcer.  They state that pathology was negative for infection and malignancy.  He was started on Carafate and Protonix and is scheduled to follow-up with GI Dr. Doty at [facility] on 10/3.  He was also found to be hypothyroid during that admission and started on Synthroid.  \par \par He completed his 15 day course of keflex.  He last saw Dr. Miles on 9/9/19.  According to Dr. Miles's note, the erosive area in the left BOT is almost completely healed.  He is scheduled to f/u with Dr. Miles in November.  He saw SLP on 9/17.  He last saw Dr. Blandon yesterday.  Today, he reports feeling well.  He states he hasn't "felt this good since January."  He denies oral/neck pain, dysphagia, odynophagia, abdominal pain, nausea, vomiting.  \par ________________________________________\par 7/8/19 - Follow-up - Mr. Fuentes was scheduled to begin RT to the stomach today.  He did not take his prophylactic zofran this morning, so declined treatment today.  He has opted to begin treatment tomorrow.  He and his wife present to clinic today for follow-up of his left base of tongue ulcer.  They state they have been seeing Dr. Clifton recently, and report that she has been happy with the progress of the ulcer.  He continues to have neck pain, which has improved significantly with oxycodone.  They plan to get a second opinion from Dr. Miles regarding his ongoing neck pain.  They also have some questions about his medications.  He points to his pain as bilateral and essentially overlying the hyoid bone.   he is able to swallow.   taste is poot\par \par 6/13/19 - SNA\par Mr. Fuentes returns for SNA.  He was last seen here on 6/3/19 at which time he was found to have a deep BOT ulcer with heaped edges on clinical exam.  PET/CT and CT were also concerning for uptake in BOT.  He was given a course of diflucan, magic mouthwash, and advised to use baking soda and meat tenderizer oral rinses and restart gabapentin and protonix.  He was also to be evaluated by Dr. Clifton for possible biopsy - she has decided to re-evaluate before deciding on biopsy.  \par \par Upper Endoscopy was done at [facility] (Dr. Santamaria) for Melena on 5/22/19; report shows "diffuse severe mucoosal changes characterized by granularity, inflammation, and nodularity were found in the cardia, in the gastric fundus, and gastric body. 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 Pylori and focal granulation tissue and fibrinopurulent debris. [facility] Path review pending.  He saw Dr. Blandon on 6/11/19 and discussed chemo vs. RT.  H/H was 12.0/38.3 on 6/11 (after Procrit).  \par \par He is here today for consideration of radiation therapy.  He states that his right facial pain is unchanged.  He will complete the course of diflucan in 3 days and is using mouth rinses with the exception of magic mouthwash.  He was encouraged to start using it.  He notes that he is "here for another reason" today.  He denies GI complaints including abdominal pain, nausea, vomiting, constipation, diarrhea, melena, hematemesis.  He has not yet followed up with GI for colonoscopy.  \par \par 6/3/19 - Follow-up\par Mr. Fuentes presents today for routine follow-up.  He was last seen here on 4/2/19.  Plan at that time was for PET/CT and CT neck with contrast in 6 weeks, continue weekly visits with Dr. Blandon, continue Ensure 3 cans/day and increase calories, continue gabapentin (300mg AM, 600mg PM), stop trazodone, continue VNS and SLP and exercises.  \par \par PET/CT 5/16/19 - Impression: \par 1. Increased FDG uptake within the root of the tongue.  While some portion of this activity may be related to treatment and subsequent ulceration, the possibility of persistent disease is raised because of the intensity and distribution of the uptake.\par 2.  Scattered subcentimeter mediastinal and para-aortic lymph nodes. \par 3.  FDG uptake diffusely throughout the stomach and may represent infectious versus inflammatory gastritis.  Please note that a similar pattern was seen on the patient's prior PET scan dated 11/12/2018.\par \par CT neck with contrast 5/16/19 - Impression: There is now a large ulcer involving the left floor of mouth that is lined by heterogenously enhancing soft tissue that is FDG avid.  Findings are suspicious for disease persistence along the ulcer walls.  Correlation with visual inspection and possibly tissue sampling is recommended for further characterization.  Presumed lymphoproliferative disease in the orbits and cervical lymph nodes has nearly resolved.  \par \par He last saw Dr. Blandon one week ago.  Today, he reports that he has been "up and down" since we last saw him.  He states that approximately two weeks ago, he was experiencing bloody stool.  His wife called an ambulance and he was taken to the ED at [facility].  He states that he was found to be anemic with hemoglobin of 3 and received 3 blood transfusions.  He  underwent GI workup including upper endoscopy which showed bleeding ulcerations in the stomach; all pathology was benign.  He notes that he had been taking Aleve for pain prior to this episode and has since stopped.  He was discharged home 3 days later.  He was advised to take protonix daily and follow-up with GI.  He states that he has stopped protonix because he has not had any episodes of bleeding.  He did not keep his follow-up appointment with GI as he was not feeling well that day.  His wife states she plans to reschedule.  \par \par He notes continued left neck pain and edema relieved with warm compresses and tylenol.  He states that is has worsened over the past 2-3 weeks.  Of note, he also reports stopping gabapentin around that time because he didn't think it was helping and it is "just another thing."  He reports continued odynophagia which prevents him from eating solid food.  He is still on a liquid diet with ensure.  He states he lost 12 pounds during his recent hospitalization but has started gaining some weight again.  He reports that xerostomia has resolved.  He plans to follow-up with his PMD Dr. Mckeon this week and will also see Dr. Clifton today.  \par \par 4/2/19- FOLLOW UP\par Mr. Fuentes presents for follow up. When he was last seen on 3/19/19, he was volume depleted and continuing to report throat pain. Plan at the time was for labs with Dr. BOBBY, complete course of Diflucan, increase Ensure plus to 3 cans/ day, continue MMW and Gabapentin, stop Trazodone, continue VNS services, and follow up with SLP. \par \par He saw SLP on 3/28/19, at which time he recommended H&N exercises to improve swallow strength. Complains of irritation in throat but no pain. Stopped trazadone and finished course of diflucan. Reports more energy to walk and exercise. Able to tolerate 3 cans of ensure a day. No longer using MMW, says its effects don’t last long. Able to tolerate liquids and soft foods. Taste beginning to come back. Constipation persists. Seeing Dr. BOBBY today for procrit. Sees him weekly per wife. He decided to cancel his trip to [place] this week. \par \par 3/19/19 - FOLLOW UP\par Today Using MMW, Biotene, he reports throat pain still persist, not using gabapentin regularly, encouraged to use as ordered to get relief. His wife says she is now forcing him to use gabapentin and to use MMW regularly. She also made sure he started the fluconazole RX. Tolerating only liquids, soft foods like yoghurt, potatoes mashed, cream of wheat. Assessed by visiting nurse, will start PT and Sp/sw therapy next week. Carries out physical activity as tolerated, walks 4 blocks daily. Constipation is relieved by miralax.\par \par 3/5/19- Pt is here for a followup 3/5/19. Today he states he feels better, tolerates puree foods(warm cereal and soups), 2-3 Ensure shakes and whey protein recommended by Nutritionist. Increased PO fluids now to 2 Liters per day. Reports Painful throat while swallowing. Relieved by MMW and biotene. Denies any other pain. Visit with Dr Blandon today at 1pm, will review bloodwork after. ER Visit from Dr Blandon office on 2/26/19 for very low hemoglobin, received blood products, Iron and IVF. Says he is taking diflucan which will complete today (although we sent in 15 pills 1 week ago - wife will check # of pills at home). \par \par 2/26/19- PTE\par Mr. Fuentes returns for an emergent PTE. Several phone calls took place over the past few weeks after he completed RT regarding poor PO intake. He was Rx'd MMW. He continued to lose weight since completion of treatment. On the night of 2/18/18, he fell in the middle of the night (did not hit his head as per patient's wife) due to dizziness. He declined coming in last week for assessment/ labs; he and his wife reported improved PO intake. His wife Selma emailed over the weekend reporting another fall due to slipping on liquid on the floor. She stated he had increased his fluid intake and was doing quite well until the fall. They also called his PCP Dr. Mckeon to have the Trazodone prescription refilled as he had been having Restless Leg Syndrome most nights. Patient and his wife thought this was the cause of the second fall as he was quite stable in the day but became off balance in the night. He last saw Dr. Blandon on 2/15/19 and has a follow up scheduled for this Friday. \par \par Today, he reports he is eating and drinking better. However, he feels generally weak and also has lightheadedness with position changes. He admits that he did hit his head with both falls, but denies headaches, LOC, confusion. He has lost four pounds since last seen on 2/8, but it is an improvement since last week (he has gained 4 pounds since last week). He is drinking 2 Ensure plus per day. Also eating soft/ pureed foods, soups, broth. He reports sore throat that is improving; was 9/ 10 last week, but now 5/ 10. Using MMW, which is helpful. Also reports xerostomia, using Biotene but only once a day. Patient and wife inquired about Trazodone; he is taking 100 mg daily for restless leg syndrome as per his PCP, but it makes him feel "disoriented." \par \par ______________________________________________\par ONCOLOGIC HISTORY \par Mr. 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 Notably, he has PMH non-Hodgkin's lymphoma (patient unsure of which subtype), diagnosed in 1999, and treated by Dr. Shepherd with CHOP and Fludarabine. \par \par He was followed by Dr. Clifton ([facility]) and had complaints of swelling and pain over the left submandibular gland in 2017. A CT scan of the neck done 12/12/17 showed no evidence of pharyngeal mass, asymmetry or laryngeal mass. There were numerous cervical lymph nodes which were larger on the left, as well as enlarged subclavian and axillary lymph nodes. \par \par He continued to have complaints of left neck pain and had occasional metallic taste in mouth in July 2018. He also developed a lisp/ difficulty speaking in July 2018.\par \par He underwent CT neck on 10/15/18, which showed the following:\par IMPRESSION: \par 1. Soft tissue mass involving the left tongue base compatible with a tongue base neoplasm\par 2. Superficial nodule involving the right cheek which may represent an enlarged periparotid lymph node measuring 2.0 x 1.1 cm in axial cross-section, minimally increased in size since the prior study\par 3. Scattered mildly enlarged lymph nodes in the neck again noted, relatively stable since the prior study\par 4. Abnormal soft tissue within the orbits, not significantly changed\par 5. Stable nodular densities within the right upper lung\par 6. Cervical spondylosis with canal and foraminal stenosis at C4-C5. \par \par He underwent a biopsy of the mass on 10/26/18 by Dr. Clifton which showed invasive SCC nonkeratinizing, poorly differentiated, p16 positive, HPV negative. Flow cytometry also done, but results were inconclusive.\par \par PET/CT ([facility])11/12/18\par IMPRESSION: \par 1. Robust lobular hypermetabolic soft tissue mass involving the tongue base consistent with known primary tongue base malignancy. It extends to the level of the hyoid without obvious involvement of the preepiglottic fat. If warranted, consider dedicated MR imaging for further assessment.\par 2. Assessment for metastatic regional lymph nodes from primary tongue malignancy is limited in this patient with evidence of low-grade lymphomatous involvement in the neck. Asymmetrically hypermetabolic but stable appearing lymph nodes in the left level III lymph node would be the most concerning for potential regional ozzy disease from tongue base malignancy.\par 3. Multiple homogeneous lymph nodes associated with low-grade uptake especially in the neck and chest and to a lesser extent the abdomen and pelvis is likely related to patient's history of non-Hodgkin's lymphoma. Associated metabolic activity is consistent with a low-grade viability.\par 4. No definite evidence to suggest focal splenic or marrow involvement of disease. Low-grade diffuse involvement is not excluded.\par 5. Stable appearance of peribronchial nodular foci in the right upper lobe, too small to characterize, but favoring a post inflammatory/infectious process. Other benign type changes are seen in the mid to lower lung fields, which can be reassessed at follow-up imaging.\par 6. No suspicious findings to suggest marginal or regional ozzy recurrence of prostate malignancy or evidence of osteoblastic metastatic disease.\par \par Today, he reports he feels generally well with exception of difficulty swallowing solid foods since the biopsy. He has been eating soups and soft foods. He also reports xerostomia and continues to have a lisp. He is mildly fatigue, but it improves with rest. He continues to work as a . He denies fever, chills, CP, SOB, N/V/D, decreased appetite. He has lost approx 6- 7 pounds since the biopsy on 10/26. Of note, he reports he is claustrophobic, has needed anxiolytics prior to imaging in the past. \par \par His dentist is Dr. Hager ([phone]) and he last saw Dr. Hager in September. \par Family history notable for brother with leukemia. \par \par

## 2019-11-11 ENCOUNTER — APPOINTMENT (OUTPATIENT)
Dept: OTOLARYNGOLOGY | Facility: CLINIC | Age: 79
End: 2019-11-11
Payer: MEDICARE

## 2019-11-11 PROCEDURE — 31575 DIAGNOSTIC LARYNGOSCOPY: CPT

## 2019-11-11 PROCEDURE — 99214 OFFICE O/P EST MOD 30 MIN: CPT | Mod: 25

## 2019-12-05 NOTE — HISTORY OF PRESENT ILLNESS
[FreeTextEntry1] : Mr. Nathaniel Fuentes has completed radiation therapy to the oropharynx/ neck for a total of 7000 cGy over 35 fractions from 12/19/18 to 2/8/19. He did not have any unexpected treatment complications during the course of treatment. He received 5 out of 7 cycles of concurrent weekly Cisplatin with Dr. Blandon.  He has had a complicated post-CRT course detailed in prior notes and below.  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  He completed radiation therapy 2700 cGy / 3000 cGy to the stomach from 7/9/19 - 8/21/19. Treatment was interrupted briefly (7/11-7/12/19) while he was admitted to [facility] for bleeding from the mouth. He also elected to take a treatment from 7/29 - 8/7/19 due to personal/work obligations. He also chose to terminate treatment early. \par \par 12/12/19 - Follow-up\par Mr. Fuentes returns for routine follow-up.  He was last seen by Dr. Duong on 9/25/19.  Plan was for f/u with Dr. Blandon and Dr. Miles and f/u with PMD regarding synthroid.  He last saw Dr. Blandon on ___.  He last saw Dr. Miles on 11/11/19.  He is scheduled to follow-up in March.  He last saw his PMD on ___.  Today, he \par \par 9/25/19 - PTE\par Mr. Fuentes returns today for post-treatment evaluation. He states he was admitted to [facility] on 8/23 with a GI bleed and hemoglobin of 4.3.  He and his wife state that he underwent EGD and was found to have a large bleeding gastric ulcer.  They state that pathology was negative for infection and malignancy.  He was started on Carafate and Protonix and is scheduled to follow-up with GI Dr. Doty at [facility] on 10/3.  He was also found to be hypothyroid during that admission and started on Synthroid.  \par \par He completed his 15 day course of keflex.  He last saw Dr. Miles on 9/9/19.  According to Dr. Miles's note, the erosive area in the left BOT is almost completely healed.  He is scheduled to f/u with Dr. Miles in November.  He saw [facility] on 9/17.  He last saw Dr. Blandon yesterday.  Today, he reports feeling well.  He states he hasn't "felt this good since January."  He denies oral/neck pain, dysphagia, odynophagia, abdominal pain, nausea, vomiting.  \par ________________________________________\par 7/8/19 - Follow-up - Mr. Fuentes was scheduled to begin RT to the stomach today.  He did not take his prophylactic zofran this morning, so declined treatment today.  He has opted to begin treatment tomorrow.  He and his wife present to clinic today for follow-up of his left base of tongue ulcer.  They state they have been seeing Dr. Clifton recently, and report that she has been happy with the progress of the ulcer.  He continues to have neck pain, which has improved significantly with oxycodone.  They plan to get a second opinion from Dr. Miles regarding his ongoing neck pain.  They also have some questions about his medications.  He points to his pain as bilateral and essentially overlying the hyoid bone.   he is able to swallow.   taste is poot\par \par 6/13/19 - SNA\par Mr. Fuentes returns for SNA.  He was last seen here on 6/3/19 at which time he was found to have a deep BOT ulcer with heaped edges on clinical exam.  PET/CT and CT were also concerning for uptake in BOT.  He was given a course of diflucan, magic mouthwash, and advised to use baking soda and meat tenderizer oral rinses and restart gabapentin and protonix.  He was also to be evaluated by Dr. Clifton for possible biopsy - she has decided to re-evaluate before deciding on biopsy.  \par \par Upper Endoscopy was done at [facility] (Dr. Santamaria) for Melena on 5/22/19; report shows "diffuse severe mucoosal changes characterized by granularity, inflammation, and nodularity were found in the cardia, in the gastric fundus, and gastric body. 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 Pylori and focal granulation tissue and fibrinopurulent debris. [facility] Path review pending.  He saw Dr. Blandon on 6/11/19 and discussed chemo vs. RT.  H/H was 12.0/38.3 on 6/11 (after Procrit).  \par \par He is here today for consideration of radiation therapy.  He states that his right facial pain is unchanged.  He will complete the course of diflucan in 3 days and is using mouth rinses with the exception of magic mouthwash.  He was encouraged to start using it.  He notes that he is "here for another reason" today.  He denies GI complaints including abdominal pain, nausea, vomiting, constipation, diarrhea, melena, hematemesis.  He has not yet followed up with GI for colonoscopy.  \par \par 6/3/19 - Follow-up\par Mr. Fuentes presents today for routine follow-up.  He was last seen here on 4/2/19.  Plan at that time was for PET/CT and CT neck with contrast in 6 weeks, continue weekly visits with Dr. Blandon, continue Ensure 3 cans/day and increase calories, continue gabapentin (300mg AM, 600mg PM), stop trazodone, continue VNS and SLP and exercises.  \par \par PET/CT 5/16/19 - Impression: \par 1. Increased FDG uptake within the root of the tongue.  While some portion of this activity may be related to treatment and subsequent ulceration, the possibility of persistent disease is raised because of the intensity and distribution of the uptake.\par 2.  Scattered subcentimeter mediastinal and para-aortic lymph nodes. \par 3.  FDG uptake diffusely throughout the stomach and may represent infectious versus inflammatory gastritis.  Please note that a similar pattern was seen on the patient's prior PET scan dated 11/12/2018.\par \par CT neck with contrast 5/16/19 - Impression: There is now a large ulcer involving the left floor of mouth that is lined by heterogenously enhancing soft tissue that is FDG avid.  Findings are suspicious for disease persistence along the ulcer walls.  Correlation with visual inspection and possibly tissue sampling is recommended for further characterization.  Presumed lymphoproliferative disease in the orbits and cervical lymph nodes has nearly resolved.  \par \par He last saw Dr. Blandon one week ago.  Today, he reports that he has been "up and down" since we last saw him.  He states that approximately two weeks ago, he was experiencing bloody stool.  His wife called an ambulance and he was taken to the ED at [facility].  He states that he was found to be anemic with hemoglobin of 3 and received 3 blood transfusions.  He  underwent GI workup including upper endoscopy which showed bleeding ulcerations in the stomach; all pathology was benign.  He notes that he had been taking Aleve for pain prior to this episode and has since stopped.  He was discharged home 3 days later.  He was advised to take protonix daily and follow-up with GI.  He states that he has stopped protonix because he has not had any episodes of bleeding.  He did not keep his follow-up appointment with GI as he was not feeling well that day.  His wife states she plans to reschedule.  \par \par He notes continued left neck pain and edema relieved with warm compresses and tylenol.  He states that is has worsened over the past 2-3 weeks.  Of note, he also reports stopping gabapentin around that time because he didn't think it was helping and it is "just another thing."  He reports continued odynophagia which prevents him from eating solid food.  He is still on a liquid diet with ensure.  He states he lost 12 pounds during his recent hospitalization but has started gaining some weight again.  He reports that xerostomia has resolved.  He plans to follow-up with his PMD Dr. Mckeon this week and will also see Dr. Clifton today.  \par \par 4/2/19- FOLLOW UP\par Mr. Fuentes presents for follow up. When he was last seen on 3/19/19, he was volume depleted and continuing to report throat pain. Plan at the time was for labs with Dr. BOBBY, complete course of Diflucan, increase Ensure plus to 3 cans/ day, continue MMW and Gabapentin, stop Trazodone, continue VNS services, and follow up with SLP. \par \par He saw SLP on 3/28/19, at which time he recommended H&N exercises to improve swallow strength. Complains of irritation in throat but no pain. Stopped trazadone and finished course of diflucan. Reports more energy to walk and exercise. Able to tolerate 3 cans of ensure a day. No longer using MMW, says its effects don’t last long. Able to tolerate liquids and soft foods. Taste beginning to come back. Constipation persists. Seeing Dr. BOBBY today for procrit. Sees him weekly per wife. He decided to cancel his trip to [place] this week. \par \par 3/19/19 - FOLLOW UP\par Today Using MMW, Biotene, he reports throat pain still persist, not using gabapentin regularly, encouraged to use as ordered to get relief. His wife says she is now forcing him to use gabapentin and to use MMW regularly. She also made sure he started the fluconazole RX. Tolerating only liquids, soft foods like yoghurt, potatoes mashed, cream of wheat. Assessed by visiting nurse, will start PT and Sp/sw therapy next week. Carries out physical activity as tolerated, walks 4 blocks daily. Constipation is relieved by miralax.\par \par 3/5/19- Pt is here for a followup 3/5/19. Today he states he feels better, tolerates puree foods(warm cereal and soups), 2-3 Ensure shakes and whey protein recommended by Nutritionist. Increased PO fluids now to 2 Liters per day. Reports Painful throat while swallowing. Relieved by MMW and biotene. Denies any other pain. Visit with Dr Blandon today at 1pm, will review bloodwork after. ER Visit from Dr Blandon office on 2/26/19 for very low hemoglobin, received blood products, Iron and IVF. Says he is taking diflucan which will complete today (although we sent in 15 pills 1 week ago - wife will check # of pills at home). \par \par 2/26/19- PTE\par Mr. Fuentes returns for an emergent PTE. Several phone calls took place over the past few weeks after he completed RT regarding poor PO intake. He was Rx'd MMW. He continued to lose weight since completion of treatment. On the night of 2/18/18, he fell in the middle of the night (did not hit his head as per patient's wife) due to dizziness. He declined coming in last week for assessment/ labs; he and his wife reported improved PO intake. His wife Selma emailed over the weekend reporting another fall due to slipping on liquid on the floor. She stated he had increased his fluid intake and was doing quite well until the fall. They also called his PCP Dr. Mckeon to have the Trazodone prescription refilled as he had been having Restless Leg Syndrome most nights. Patient and his wife thought this was the cause of the second fall as he was quite stable in the day but became off balance in the night. He last saw Dr. Blandon on 2/15/19 and has a follow up scheduled for this Friday. \par \par Today, he reports he is eating and drinking better. However, he feels generally weak and also has lightheadedness with position changes. He admits that he did hit his head with both falls, but denies headaches, LOC, confusion. He has lost four pounds since last seen on 2/8, but it is an improvement since last week (he has gained 4 pounds since last week). He is drinking 2 Ensure plus per day. Also eating soft/ pureed foods, soups, broth. He reports sore throat that is improving; was 9/ 10 last week, but now 5/ 10. Using MMW, which is helpful. Also reports xerostomia, using Biotene but only once a day. Patient and wife inquired about Trazodone; he is taking 100 mg daily for restless leg syndrome as per his PCP, but it makes him feel "disoriented." \par \par ______________________________________________\par ONCOLOGIC HISTORY \par Mr. 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 Notably, he has PMH non-Hodgkin's lymphoma (patient unsure of which subtype), diagnosed in 1999, and treated by Dr. Shepherd with CHOP and Fludarabine. \par \par He was followed by Dr. Clifton (Head & Neck) and had complaints of swelling and pain over the left submandibular gland in 2017. A CT scan of the neck done 12/12/17 showed no evidence of pharyngeal mass, asymmetry or laryngeal mass. There were numerous cervical lymph nodes which were larger on the left, as well as enlarged subclavian and axillary lymph nodes. \par \par He continued to have complaints of left neck pain and had occasional metallic taste in mouth in July 2018. He also developed a lisp/ difficulty speaking in July 2018.\par \par He underwent CT neck on 10/15/18, which showed the following:\par IMPRESSION: \par 1. Soft tissue mass involving the left tongue base compatible with a tongue base neoplasm\par 2. Superficial nodule involving the right cheek which may represent an enlarged periparotid lymph node measuring 2.0 x 1.1 cm in axial cross-section, minimally increased in size since the prior study\par 3. Scattered mildly enlarged lymph nodes in the neck again noted, relatively stable since the prior study\par 4. Abnormal soft tissue within the orbits, not significantly changed\par 5. Stable nodular densities within the right upper lung\par 6. Cervical spondylosis with canal and foraminal stenosis at C4-C5. \par \par He underwent a biopsy of the mass on 10/26/18 by Dr. Clifton which showed invasive SCC nonkeratinizing, poorly differentiated, p16 positive, HPV negative. Flow cytometry also done, but results were inconclusive.\par \par PET/CT ([facility])11/12/18\par IMPRESSION: \par 1. Robust lobular hypermetabolic soft tissue mass involving the tongue base consistent with known primary tongue base malignancy. It extends to the level of the hyoid without obvious involvement of the preepiglottic fat. If warranted, consider dedicated MR imaging for further assessment.\par 2. Assessment for metastatic regional lymph nodes from primary tongue malignancy is limited in this patient with evidence of low-grade lymphomatous involvement in the neck. Asymmetrically hypermetabolic but stable appearing lymph nodes in the left level III lymph node would be the most concerning for potential regional ozzy disease from tongue base malignancy.\par 3. Multiple homogeneous lymph nodes associated with low-grade uptake especially in the neck and chest and to a lesser extent the abdomen and pelvis is likely related to patient's history of non-Hodgkin's lymphoma. Associated metabolic activity is consistent with a low-grade viability.\par 4. No definite evidence to suggest focal splenic or marrow involvement of disease. Low-grade diffuse involvement is not excluded.\par 5. Stable appearance of peribronchial nodular foci in the right upper lobe, too small to characterize, but favoring a post inflammatory/infectious process. Other benign type changes are seen in the mid to lower lung fields, which can be reassessed at follow-up imaging.\par 6. No suspicious findings to suggest marginal or regional ozzy recurrence of prostate malignancy or evidence of osteoblastic metastatic disease.\par \par Today, he reports he feels generally well with exception of difficulty swallowing solid foods since the biopsy. He has been eating soups and soft foods. He also reports xerostomia and continues to have a lisp. He is mildly fatigue, but it improves with rest. He continues to work as a . He denies fever, chills, CP, SOB, N/V/D, decreased appetite. He has lost approx 6- 7 pounds since the biopsy on 10/26. Of note, he reports he is claustrophobic, has needed anxiolytics prior to imaging in the past. \par \par His dentist is Dr. Hager ([phone]) and he last saw Dr. Hager in September. \par Family history notable for brother with leukemia. \par \par

## 2019-12-05 NOTE — REVIEW OF SYSTEMS
[Negative] : Integumentary [FreeTextEntry4] : see HPI [FreeTextEntry7] : see HPI [Fatigue: Grade 1 - Fatigue relieved by rest] : Fatigue: Grade 1 - Fatigue relieved by rest

## 2019-12-05 NOTE — DISEASE MANAGEMENT
[Clinical] : TNM Stage: c [FreeTextEntry4] : IAE MALT lymphoma of the stomach [TTNM] : - [MTNM] : - [NTNM] : - [III] : III

## 2019-12-05 NOTE — PHYSICAL EXAM
[Thin] : thin [Normal Oral Cavity] : oral cavity was normal [Normal] : supple with no thyromegaly or masses appreciated [Respiration, Rhythm And Depth] : normal respiratory rhythm and effort [Exaggerated Use Of Accessory Muscles For Inspiration] : no accessory muscle use [Heart Sounds] : normal S1 and S2 [Heart Rate And Rhythm] : heart rate and rhythm were normal [Nondistended] : nondistended [Abdomen Soft] : soft [Skin Color & Pigmentation] : normal skin color and pigmentation [de-identified] : ECOG 1 [Oriented To Time, Place, And Person] : oriented to person, place, and time [de-identified] : no palpable nodes in the neck.  there is modest submental edema.  there are no suspicious findings int he oral cavity.   He was just scoped by Dr. Ford.

## 2019-12-12 ENCOUNTER — APPOINTMENT (OUTPATIENT)
Dept: RADIATION ONCOLOGY | Facility: CLINIC | Age: 79
End: 2019-12-12
Payer: MEDICARE

## 2020-01-07 ENCOUNTER — FORM ENCOUNTER (OUTPATIENT)
Age: 80
End: 2020-01-07

## 2020-01-08 ENCOUNTER — OUTPATIENT (OUTPATIENT)
Dept: OUTPATIENT SERVICES | Facility: HOSPITAL | Age: 80
LOS: 1 days | End: 2020-01-08
Payer: MEDICARE

## 2020-01-08 ENCOUNTER — APPOINTMENT (OUTPATIENT)
Dept: CT IMAGING | Facility: HOSPITAL | Age: 80
End: 2020-01-08

## 2020-01-08 DIAGNOSIS — Z98.890 OTHER SPECIFIED POSTPROCEDURAL STATES: Chronic | ICD-10-CM

## 2020-01-08 DIAGNOSIS — Z90.49 ACQUIRED ABSENCE OF OTHER SPECIFIED PARTS OF DIGESTIVE TRACT: Chronic | ICD-10-CM

## 2020-01-08 DIAGNOSIS — Z41.9 ENCOUNTER FOR PROCEDURE FOR PURPOSES OTHER THAN REMEDYING HEALTH STATE, UNSPECIFIED: Chronic | ICD-10-CM

## 2020-01-08 PROCEDURE — 70491 CT SOFT TISSUE NECK W/DYE: CPT | Mod: 26

## 2020-01-08 PROCEDURE — 70491 CT SOFT TISSUE NECK W/DYE: CPT

## 2020-01-16 ENCOUNTER — APPOINTMENT (OUTPATIENT)
Dept: RADIATION ONCOLOGY | Facility: CLINIC | Age: 80
End: 2020-01-16
Payer: MEDICARE

## 2020-01-16 VITALS
SYSTOLIC BLOOD PRESSURE: 111 MMHG | BODY MASS INDEX: 22.79 KG/M2 | OXYGEN SATURATION: 98 % | HEART RATE: 82 BPM | DIASTOLIC BLOOD PRESSURE: 74 MMHG | RESPIRATION RATE: 16 BRPM | WEIGHT: 158.8 LBS

## 2020-01-16 DIAGNOSIS — C88.4 EXTRANODAL MARGINAL ZONE B-CELL LYMPHOMA OF MUCOSA-ASSOCIATED LYMPHOID TISSUE [MALT-LYMPHOMA]: ICD-10-CM

## 2020-01-16 PROCEDURE — 31575 DIAGNOSTIC LARYNGOSCOPY: CPT

## 2020-01-16 PROCEDURE — 99214 OFFICE O/P EST MOD 30 MIN: CPT | Mod: 25

## 2020-01-16 RX ORDER — SUCRALFATE 1 G/10ML
SUSPENSION ORAL
Refills: 0 | Status: DISCONTINUED | COMMUNITY
End: 2020-01-16

## 2020-01-16 RX ORDER — ONDANSETRON 8 MG/1
8 TABLET ORAL
Qty: 30 | Refills: 0 | Status: DISCONTINUED | COMMUNITY
Start: 2019-06-13 | End: 2020-01-16

## 2020-01-16 RX ORDER — OXYCODONE 5 MG/1
5 TABLET ORAL
Qty: 120 | Refills: 0 | Status: DISCONTINUED | COMMUNITY
Start: 2019-06-17 | End: 2020-01-16

## 2020-01-16 RX ORDER — GABAPENTIN 300 MG/1
300 CAPSULE ORAL 3 TIMES DAILY
Qty: 180 | Refills: 1 | Status: DISCONTINUED | COMMUNITY
Start: 2019-06-03 | End: 2020-01-16

## 2020-01-16 RX ORDER — LIDOCAINE HYDROCHLORIDE 20 MG/ML
2 SOLUTION ORAL; TOPICAL
Qty: 600 | Refills: 1 | Status: DISCONTINUED | COMMUNITY
Start: 2019-06-03 | End: 2020-01-16

## 2020-01-16 RX ORDER — PANTOPRAZOLE 40 MG/1
40 TABLET, DELAYED RELEASE ORAL DAILY
Qty: 30 | Refills: 0 | Status: DISCONTINUED | COMMUNITY
Start: 2019-06-03 | End: 2020-01-16

## 2020-01-16 RX ORDER — DOCUSATE SODIUM 100 MG/1
100 CAPSULE ORAL
Qty: 90 | Refills: 0 | Status: DISCONTINUED | COMMUNITY
Start: 2019-06-17 | End: 2020-01-16

## 2020-01-16 RX ORDER — LEVOTHYROXINE SODIUM 0.17 MG/1
TABLET ORAL
Refills: 0 | Status: DISCONTINUED | COMMUNITY
End: 2020-01-16

## 2020-01-16 NOTE — HISTORY OF PRESENT ILLNESS
[FreeTextEntry1] : Mr. Nathaniel Fuentes completed radiation therapy to the oropharynx/ neck for [id number] base of tongue SCC, 7000 cGy over 35 fractions from 12/19/18 to 2/8/19.  He received 5 out of 7 cycles of concurrent weekly Cisplatin with Dr. Blandon.  He had a complicated post-CRT course detailed in prior notes and below.  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  He completed radiation therapy 2700 cGy / 3000 cGy to the stomach from 7/9/19 - 8/21/19. Treatment was interrupted briefly (7/11-7/12/19) while he was admitted to [facility] for bleeding from the mouth. He also elected to take a treatment from 7/29 - 8/7/19 due to personal/work obligations. He also chose to terminate treatment early. \par \par 1/16/20 - Follow-up\par Mr. Fuentes returns for routine follow-up.  He was last seen by Dr. Duong on 9/25/19.  Plan was for f/u with Dr. Blandon and Dr. Miles and f/u with PMD regarding synthroid.  He last saw Dr. Blandon 2 weeks ago.  He states he is scheduled for a PET/CT next week and follow-up with Dr. Blandon.  He last saw Dr. Miles on 11/11/19.  He is scheduled to follow-up in March.  He has not followed up with his PCP regarding synthroid.  He states he took the medication until his supply ran out.  He has not had TSH checked.  He has not followed up with GI.  He has not seen the dentist since prior to radiation.  He saw SLP on 9/17/19 and was recommended MBS, which has not been done.  \par \par CT neck with contrast 1/8/20 - IMPRESSION: There is no evidence of local or regional ozzy recurrence of oral cavity/oropharyngeal carcinoma. 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 Right upper lobe nodules are unchanged since prior study of 7/2019. If further characterization is desired, recommend correlation with dedicated chest CT or with PET/CT. \par \par Today, he reports feeling well.  He states he no longer feels the effects of radiation.  He denies dysphagia and odynophagia.  He is eating a regular diet and has gained 20 pounds.  He notes his left tongue still feels "a bit raw" but is improving.  He denies abdominal pain, nausea, vomiting, constipation, diarrhea, melena and hematochezia.  \par \par 9/25/19 - PTE\par Mr. Fuentes returns today for post-treatment evaluation. He states he was admitted to [facility] on 8/23 with a GI bleed and hemoglobin of 4.3.  He and his wife state that he underwent EGD and was found to have a large bleeding gastric ulcer.  They state that pathology was negative for infection and malignancy.  He was started on Carafate and Protonix and is scheduled to follow-up with GI Dr. Doty at [facility] on 10/3.  He was also found to be hypothyroid during that admission and started on Synthroid.  \par \par He completed his 15 day course of keflex.  He last saw Dr. Miles on 9/9/19.  According to Dr. Miles's note, the erosive area in the left BOT is almost completely healed.  He is scheduled to f/u with Dr. Miles in November.  He saw SLP on 9/17.  He last saw Dr. Blandon yesterday.  Today, he reports feeling well.  He states he hasn't "felt this good since January."  He denies oral/neck pain, dysphagia, odynophagia, abdominal pain, nausea, vomiting.  \par ________________________________________\par 7/8/19 - Follow-up - Mr. Fuentes was scheduled to begin RT to the stomach today.  He did not take his prophylactic zofran this morning, so declined treatment today.  He has opted to begin treatment tomorrow.  He and his wife present to clinic today for follow-up of his left base of tongue ulcer.  They state they have been seeing Dr. Clifton recently, and report that she has been happy with the progress of the ulcer.  He continues to have neck pain, which has improved significantly with oxycodone.  They plan to get a second opinion from Dr. Miles regarding his ongoing neck pain.  They also have some questions about his medications.  He points to his pain as bilateral and essentially overlying the hyoid bone.   he is able to swallow.   taste is poot\par \par 6/13/19 - SNA\par Mr. Fuentes returns for SNA.  He was last seen here on 6/3/19 at which time he was found to have a deep BOT ulcer with heaped edges on clinical exam.  PET/CT and CT were also concerning for uptake in BOT.  He was given a course of diflucan, magic mouthwash, and advised to use baking soda and meat tenderizer oral rinses and restart gabapentin and protonix.  He was also to be evaluated by Dr. Clifton for possible biopsy - she has decided to re-evaluate before deciding on biopsy.  \par \par Upper Endoscopy was done at [facility] (Dr. Santamaria) for Melena on 5/22/19; report shows "diffuse severe mucoosal changes characterized by granularity, inflammation, and nodularity were found in the cardia, in the gastric fundus, and gastric body. 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 Pylori and focal granulation tissue and fibrinopurulent debris. [facility] Path review pending.  He saw Dr. Blandon on 6/11/19 and discussed chemo vs. RT.  H/H was 12.0/38.3 on 6/11 (after Procrit).  \par \par He is here today for consideration of radiation therapy.  He states that his right facial pain is unchanged.  He will complete the course of diflucan in 3 days and is using mouth rinses with the exception of magic mouthwash.  He was encouraged to start using it.  He notes that he is "here for another reason" today.  He denies GI complaints including abdominal pain, nausea, vomiting, constipation, diarrhea, melena, hematemesis.  He has not yet followed up with GI for colonoscopy.  \par \par 6/3/19 - Follow-up\par Mr. Fuentes presents today for routine follow-up.  He was last seen here on 4/2/19.  Plan at that time was for PET/CT and CT neck with contrast in 6 weeks, continue weekly visits with Dr. Blandon, continue Ensure 3 cans/day and increase calories, continue gabapentin (300mg AM, 600mg PM), stop trazodone, continue VNS and SLP and exercises.  \par \par PET/CT 5/16/19 - Impression: \par 1. Increased FDG uptake within the root of the tongue.  While some portion of this activity may be related to treatment and subsequent ulceration, the possibility of persistent disease is raised because of the intensity and distribution of the uptake.\par 2.  Scattered subcentimeter mediastinal and para-aortic lymph nodes. \par 3.  FDG uptake diffusely throughout the stomach and may represent infectious versus inflammatory gastritis.  Please note that a similar pattern was seen on the patient's prior PET scan dated 11/12/2018.\par \par CT neck with contrast 5/16/19 - Impression: There is now a large ulcer involving the left floor of mouth that is lined by heterogenously enhancing soft tissue that is FDG avid.  Findings are suspicious for disease persistence along the ulcer walls.  Correlation with visual inspection and possibly tissue sampling is recommended for further characterization.  Presumed lymphoproliferative disease in the orbits and cervical lymph nodes has nearly resolved.  \par \par He last saw Dr. Blandon one week ago.  Today, he reports that he has been "up and down" since we last saw him.  He states that approximately two weeks ago, he was experiencing bloody stool.  His wife called an ambulance and he was taken to the ED at [facility].  He states that he was found to be anemic with hemoglobin of 3 and received 3 blood transfusions.  He  underwent GI workup including upper endoscopy which showed bleeding ulcerations in the stomach; all pathology was benign.  He notes that he had been taking Aleve for pain prior to this episode and has since stopped.  He was discharged home 3 days later.  He was advised to take protonix daily and follow-up with GI.  He states that he has stopped protonix because he has not had any episodes of bleeding.  He did not keep his follow-up appointment with GI as he was not feeling well that day.  His wife states she plans to reschedule.  \par \par He notes continued left neck pain and edema relieved with warm compresses and tylenol.  He states that is has worsened over the past 2-3 weeks.  Of note, he also reports stopping gabapentin around that time because he didn't think it was helping and it is "just another thing."  He reports continued odynophagia which prevents him from eating solid food.  He is still on a liquid diet with ensure.  He states he lost 12 pounds during his recent hospitalization but has started gaining some weight again.  He reports that xerostomia has resolved.  He plans to follow-up with his PMD Dr. Mckeon this week and will also see Dr. Clifton today.  \par \par 4/2/19- FOLLOW UP\par Mr. Fuentes presents for follow up. When he was last seen on 3/19/19, he was volume depleted and continuing to report throat pain. Plan at the time was for labs with Dr. BOBBY, complete course of Diflucan, increase Ensure plus to 3 cans/ day, continue MMW and Gabapentin, stop Trazodone, continue VNS services, and follow up with SLP. \par \par He saw SLP on 3/28/19, at which time he recommended H&N exercises to improve swallow strength. Complains of irritation in throat but no pain. Stopped trazadone and finished course of diflucan. Reports more energy to walk and exercise. Able to tolerate 3 cans of ensure a day. No longer using MMW, says its effects don’t last long. Able to tolerate liquids and soft foods. Taste beginning to come back. Constipation persists. Seeing Dr. BOBBY today for eleno. Sees him weekly per wife. He decided to cancel his trip to [place] this week. \par \par 3/19/19 - FOLLOW UP\par Today Using MMW, Biotene, he reports throat pain still persist, not using gabapentin regularly, encouraged to use as ordered to get relief. His wife says she is now forcing him to use gabapentin and to use MMW regularly. She also made sure he started the fluconazole RX. Tolerating only liquids, soft foods like yoghurt, potatoes mashed, cream of wheat. Assessed by visiting nurse, will start PT and Sp/sw therapy next week. Carries out physical activity as tolerated, walks 4 blocks daily. Constipation is relieved by miralax.\par \par 3/5/19- Pt is here for a followup 3/5/19. Today he states he feels better, tolerates puree foods(warm cereal and soups), 2-3 Ensure shakes and whey protein recommended by Nutritionist. Increased PO fluids now to 2 Liters per day. Reports Painful throat while swallowing. Relieved by MMW and biotene. Denies any other pain. Visit with Dr Blandon today at 1pm, will review bloodwork after. ER Visit from Dr Blandon office on 2/26/19 for very low hemoglobin, received blood products, Iron and IVF. Says he is taking diflucan which will complete today (although we sent in 15 pills 1 week ago - wife will check # of pills at home). \par \par 2/26/19- PTE\par Mr. Fuentes returns for an emergent PTE. Several phone calls took place over the past few weeks after he completed RT regarding poor PO intake. He was Rx'd MMW. He continued to lose weight since completion of treatment. On the night of 2/18/18, he fell in the middle of the night (did not hit his head as per patient's wife) due to dizziness. He declined coming in last week for assessment/ labs; he and his wife reported improved PO intake. His wife Selma emailed over the weekend reporting another fall due to slipping on liquid on the floor. She stated he had increased his fluid intake and was doing quite well until the fall. They also called his PCP Dr. Mckeon to have the Trazodone prescription refilled as he had been having Restless Leg Syndrome most nights. Patient and his wife thought this was the cause of the second fall as he was quite stable in the day but became off balance in the night. He last saw Dr. Blandon on 2/15/19 and has a follow up scheduled for this Friday. \par \par Today, he reports he is eating and drinking better. However, he feels generally weak and also has lightheadedness with position changes. He admits that he did hit his head with both falls, but denies headaches, LOC, confusion. He has lost four pounds since last seen on 2/8, but it is an improvement since last week (he has gained 4 pounds since last week). He is drinking 2 Ensure plus per day. Also eating soft/ pureed foods, soups, broth. He reports sore throat that is improving; was 9/ 10 last week, but now 5/ 10. Using MMW, which is helpful. Also reports xerostomia, using Biotene but only once a day. Patient and wife inquired about Trazodone; he is taking 100 mg daily for restless leg syndrome as per his PCP, but it makes him feel "disoriented." \par \par ______________________________________________\par ONCOLOGIC HISTORY \par Mr. 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 Notably, he has PMH non-Hodgkin's lymphoma (patient unsure of which subtype), diagnosed in 1999, and treated by Dr. Shepherd with CHOP and Fludarabine. \par \par He was followed by Dr. Clifton (Head & Neck) and had complaints of swelling and pain over the left submandibular gland in 2017. A CT scan of the neck done 12/12/17 showed no evidence of pharyngeal mass, asymmetry or laryngeal mass. There were numerous cervical lymph nodes which were larger on the left, as well as enlarged subclavian and axillary lymph nodes. \par \par He continued to have complaints of left neck pain and had occasional metallic taste in mouth in July 2018. He also developed a lisp/ difficulty speaking in July 2018.\par \par He underwent CT neck on 10/15/18, which showed the following:\par IMPRESSION: \par 1. Soft tissue mass involving the left tongue base compatible with a tongue base neoplasm\par 2. Superficial nodule involving the right cheek which may represent an enlarged periparotid lymph node measuring 2.0 x 1.1 cm in axial cross-section, minimally increased in size since the prior study\par 3. Scattered mildly enlarged lymph nodes in the neck again noted, relatively stable since the prior study\par 4. Abnormal soft tissue within the orbits, not significantly changed\par 5. Stable nodular densities within the right upper lung\par 6. Cervical spondylosis with canal and foraminal stenosis at C4-C5. \par \par He underwent a biopsy of the mass on 10/26/18 by Dr. Clifton which showed invasive SCC nonkeratinizing, poorly differentiated, p16 positive, HPV negative. Flow cytometry also done, but results were inconclusive.\par \par PET/CT ([facility])11/12/18\par IMPRESSION: \par 1. Robust lobular hypermetabolic soft tissue mass involving the tongue base consistent with known primary tongue base malignancy. It extends to the level of the hyoid without obvious involvement of the preepiglottic fat. If warranted, consider dedicated MR imaging for further assessment.\par 2. Assessment for metastatic regional lymph nodes from primary tongue malignancy is limited in this patient with evidence of low-grade lymphomatous involvement in the neck. Asymmetrically hypermetabolic but stable appearing lymph nodes in the left level III lymph node would be the most concerning for potential regional ozzy disease from tongue base malignancy.\par 3. Multiple homogeneous lymph nodes associated with low-grade uptake especially in the neck and chest and to a lesser extent the abdomen and pelvis is likely related to patient's history of non-Hodgkin's lymphoma. Associated metabolic activity is consistent with a low-grade viability.\par 4. No definite evidence to suggest focal splenic or marrow involvement of disease. Low-grade diffuse involvement is not excluded.\par 5. Stable appearance of peribronchial nodular foci in the right upper lobe, too small to characterize, but favoring a post inflammatory/infectious process. Other benign type changes are seen in the mid to lower lung fields, which can be reassessed at follow-up imaging.\par 6. No suspicious findings to suggest marginal or regional ozzy recurrence of prostate malignancy or evidence of osteoblastic metastatic disease.\par \par Today, he reports he feels generally well with exception of difficulty swallowing solid foods since the biopsy. He has been eating soups and soft foods. He also reports xerostomia and continues to have a lisp. He is mildly fatigue, but it improves with rest. He continues to work as a . He denies fever, chills, CP, SOB, N/V/D, decreased appetite. He has lost approx 6- 7 pounds since the biopsy on 10/26. Of note, he reports he is claustrophobic, has needed anxiolytics prior to imaging in the past. \par \par His dentist is Dr. Hager ([phone]) and he last saw Dr. Hager in September. \par Family history notable for brother with leukemia. \par \par

## 2020-01-16 NOTE — DATA REVIEWED
[FreeTextEntry1] : I have personally reviewed all relevant imaging studies (PET, CT) and pathology and endoscopy reports. I have discussed the case with Dr. Cade Avila

## 2020-01-16 NOTE — REASON FOR VISIT
[Other: ___] : [unfilled] [Spouse] : spouse [Routine Follow-Up] : routine follow-up visit for [Head and Neck Cancer] : head and neck cancer

## 2020-01-16 NOTE — REVIEW OF SYSTEMS
[Negative] : Integumentary [Fatigue: Grade 0] : Fatigue: Grade 0 [FreeTextEntry4] : see HPI [FreeTextEntry7] : see HPI

## 2020-01-16 NOTE — PHYSICAL EXAM
[Respiration, Rhythm And Depth] : normal respiratory rhythm and effort [Exaggerated Use Of Accessory Muscles For Inspiration] : no accessory muscle use [Heart Sounds] : normal S1 and S2 [Heart Rate And Rhythm] : heart rate and rhythm were normal [Skin Color & Pigmentation] : normal skin color and pigmentation [Abdomen Soft] : soft [Nondistended] : nondistended [Oriented To Time, Place, And Person] : oriented to person, place, and time [Examination Of The Oral Cavity] : the lips and gums were normal [Normal Oral Cavity] : oral cavity was normal [Cervical Lymph Nodes Enlarged Anterior Bilaterally] : anterior cervical [Cervical Lymph Nodes Enlarged Posterior Bilaterally] : posterior cervical [Supraclavicular Lymph Nodes Enlarged Bilaterally] : supraclavicular [Normal] : no focal deficits

## 2020-01-16 NOTE — PROCEDURE
[Surveillance] : monitor for disease recurrence [Topical Lidocaine] : topical lidocaine [Flexible Endoscope] : examined with the flexible endoscope [Photographs Taken] : photographs taken

## 2020-01-16 NOTE — DISEASE MANAGEMENT
[Clinical] : TNM Stage: c [III] : III [NTNM] : - [TTNM] : - [FreeTextEntry4] : IAE MALT lymphoma of the stomach [MTNM] : -

## 2020-01-16 NOTE — VITALS
[ECOG Performance Status: 2 - Ambulatory and capable of all self care but unable to carry out any work activities] : Performance Status: 2 - Ambulatory and capable of all self care but unable to carry out any work activities. Up and about more than 50% of waking hours [80: Normal activity with effort; some signs or symptoms of disease.] : 80: Normal activity with effort; some signs or symptoms of disease.

## 2020-06-17 ENCOUNTER — APPOINTMENT (OUTPATIENT)
Dept: OTOLARYNGOLOGY | Facility: CLINIC | Age: 80
End: 2020-06-17
Payer: MEDICARE

## 2020-06-17 DIAGNOSIS — C01 MALIGNANT NEOPLASM OF BASE OF TONGUE: ICD-10-CM

## 2020-06-17 DIAGNOSIS — R07.0 PAIN IN THROAT: ICD-10-CM

## 2020-06-17 PROCEDURE — 31575 DIAGNOSTIC LARYNGOSCOPY: CPT

## 2020-06-17 PROCEDURE — 99214 OFFICE O/P EST MOD 30 MIN: CPT | Mod: 25

## 2020-07-13 NOTE — PROCEDURE
[Surveillance] : monitor for disease recurrence [Topical Lidocaine] : topical lidocaine [Photographs Taken] : photographs taken [Flexible Endoscope] : examined with the flexible endoscope

## 2020-07-13 NOTE — REVIEW OF SYSTEMS
[Negative] : Integumentary [FreeTextEntry4] : see HPI [FreeTextEntry7] : see HPI [Fatigue: Grade 0] : Fatigue: Grade 0

## 2020-07-13 NOTE — PHYSICAL EXAM
[Normal Oral Cavity] : oral cavity was normal [Examination Of The Oral Cavity] : the lips and gums were normal [Respiration, Rhythm And Depth] : normal respiratory rhythm and effort [Exaggerated Use Of Accessory Muscles For Inspiration] : no accessory muscle use [Heart Sounds] : normal S1 and S2 [Heart Rate And Rhythm] : heart rate and rhythm were normal [Abdomen Soft] : soft [Cervical Lymph Nodes Enlarged Posterior Bilaterally] : posterior cervical [Nondistended] : nondistended [Cervical Lymph Nodes Enlarged Anterior Bilaterally] : anterior cervical [Supraclavicular Lymph Nodes Enlarged Bilaterally] : supraclavicular [Skin Color & Pigmentation] : normal skin color and pigmentation [Normal] : no focal deficits [Oriented To Time, Place, And Person] : oriented to person, place, and time

## 2020-07-13 NOTE — DISEASE MANAGEMENT
[Clinical] : TNM Stage: c [NTNM] : - [FreeTextEntry4] : IAE MALT lymphoma of the stomach [TTNM] : - [MTNM] : - [III] : III

## 2020-07-13 NOTE — PROCEDURE
[Surveillance] : monitor for disease recurrence [Flexible Endoscope] : examined with the flexible endoscope [Topical Lidocaine] : topical lidocaine [Photographs Taken] : photographs taken

## 2020-07-13 NOTE — REASON FOR VISIT
[Routine Follow-Up] : routine follow-up visit for [Head and Neck Cancer] : head and neck cancer [Other: ___] : [unfilled] [Spouse] : spouse

## 2020-07-13 NOTE — HISTORY OF PRESENT ILLNESS
[FreeTextEntry1] : Mr. Nathaniel Fuentes completed radiation therapy to the oropharynx/ neck for [id number] base of tongue SCC, 7000 cGy over 35 fractions from 12/19/18 to 2/8/19.  He received 5 out of 7 cycles of concurrent weekly Cisplatin with Dr. Blandon.  He had a complicated post-CRT course detailed in prior notes and below.  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  He completed radiation therapy 2700 cGy / 3000 cGy to the stomach from 7/9/19 - 8/21/19. Treatment was interrupted briefly (7/11-7/12/19) while he was admitted to [facility] for bleeding from the mouth. He also elected to take a treatment from 7/29 - 8/7/19 due to personal/work obligations. He also chose to terminate treatment early. \par \par 7/14/20 - Follow-up\par Nathaniel returns for routine follow-up.  He was last seen on 1/16/20.  Plan was for PET/CT and f/u with Dr. Blandon, f/u with Dr. Miles, f/u with Dr. Vazquez at [facility] for EGD in February/March, f/u with PCP regarding TSH, f/u with dental, head and neck exercises.  He last saw Dr. Blandon on _____.  He last saw Dr. Miles on 6/17/20, plan is for f/u in February 2021.  He last saw Dr. Vazquez on ____.  He last saw his PCP on ____.  He last saw dental on ______.  Today, he xxx\par \par EGD\par TSH\par \par 1/16/20 - Follow-up\par Mr. Fuentes returns for routine follow-up.  He was last seen by Dr. Duong on 9/25/19.  Plan was for f/u with Dr. Blandon and Dr. Miles and f/u with PMD regarding synthroid.  He last saw Dr. Blandon 2 weeks ago.  He states he is scheduled for a PET/CT next week and follow-up with Dr. Blandon.  He last saw Dr. Miles on 11/11/19.  He is scheduled to follow-up in March.  He has not followed up with his PCP regarding synthroid.  He states he took the medication until his supply ran out.  He has not had TSH checked.  He has not followed up with GI.  He has not seen the dentist since prior to radiation.  He saw SLP on 9/17/19 and was recommended MBS, which has not been done.  \par \par CT neck with contrast 1/8/20 - IMPRESSION: There is no evidence of local or regional ozzy recurrence of oral cavity/oropharyngeal carcinoma. 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 Right upper lobe nodules are unchanged since prior study of 7/2019. If further characterization is desired, recommend correlation with dedicated chest CT or with PET/CT. \par \par Today, he reports feeling well.  He states he no longer feels the effects of radiation.  He denies dysphagia and odynophagia.  He is eating a regular diet and has gained 20 pounds.  He notes his left tongue still feels "a bit raw" but is improving.  He denies abdominal pain, nausea, vomiting, constipation, diarrhea, melena and hematochezia.  \par \par 9/25/19 - PTE\par Mr. Fuentes returns today for post-treatment evaluation. He states he was admitted to [facility] on 8/23 with a GI bleed and hemoglobin of 4.3.  He and his wife state that he underwent EGD and was found to have a large bleeding gastric ulcer.  They state that pathology was negative for infection and malignancy.  He was started on Carafate and Protonix and is scheduled to follow-up with GI Dr. Doty at [facility] on 10/3.  He was also found to be hypothyroid during that admission and started on Synthroid.  \par \par He completed his 15 day course of keflex.  He last saw Dr. Miles on 9/9/19.  According to Dr. Miles's note, the erosive area in the left BOT is almost completely healed.  He is scheduled to f/u with Dr. Miles in November.  He saw SLP on 9/17.  He last saw Dr. Blandon yesterday.  Today, he reports feeling well.  He states he hasn't "felt this good since January."  He denies oral/neck pain, dysphagia, odynophagia, abdominal pain, nausea, vomiting.  \par ________________________________________\par 7/8/19 - Follow-up - Mr. Fuentes was scheduled to begin RT to the stomach today.  He did not take his prophylactic zofran this morning, so declined treatment today.  He has opted to begin treatment tomorrow.  He and his wife present to clinic today for follow-up of his left base of tongue ulcer.  They state they have been seeing Dr. Clifton recently, and report that she has been happy with the progress of the ulcer.  He continues to have neck pain, which has improved significantly with oxycodone.  They plan to get a second opinion from Dr. Miles regarding his ongoing neck pain.  They also have some questions about his medications.  He points to his pain as bilateral and essentially overlying the hyoid bone.   he is able to swallow.   taste is poot\par \par 6/13/19 - SNA\par Mr. Fuentes returns for SNA.  He was last seen here on 6/3/19 at which time he was found to have a deep BOT ulcer with heaped edges on clinical exam.  PET/CT and CT were also concerning for uptake in BOT.  He was given a course of diflucan, magic mouthwash, and advised to use baking soda and meat tenderizer oral rinses and restart gabapentin and protonix.  He was also to be evaluated by Dr. Clifton for possible biopsy - she has decided to re-evaluate before deciding on biopsy.  \par \par Upper Endoscopy was done at [facility] (Dr. Santamaria) for Melena on 5/22/19; report shows "diffuse severe mucoosal changes characterized by granularity, inflammation, and nodularity were found in the cardia, in the gastric fundus, and gastric body. 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 Pylori and focal granulation tissue and fibrinopurulent debris. [facility] Path review pending.  He saw Dr. Blandon on 6/11/19 and discussed chemo vs. RT.  H/H was 12.0/38.3 on 6/11 (after Procrit).  \par \par He is here today for consideration of radiation therapy.  He states that his right facial pain is unchanged.  He will complete the course of diflucan in 3 days and is using mouth rinses with the exception of magic mouthwash.  He was encouraged to start using it.  He notes that he is "here for another reason" today.  He denies GI complaints including abdominal pain, nausea, vomiting, constipation, diarrhea, melena, hematemesis.  He has not yet followed up with GI for colonoscopy.  \par \par 6/3/19 - Follow-up\par Mr. Fuentes presents today for routine follow-up.  He was last seen here on 4/2/19.  Plan at that time was for PET/CT and CT neck with contrast in 6 weeks, continue weekly visits with Dr. Blandon, continue Ensure 3 cans/day and increase calories, continue gabapentin (300mg AM, 600mg PM), stop trazodone, continue VNS and SLP and exercises.  \par \par PET/CT 5/16/19 - Impression: \par 1. Increased FDG uptake within the root of the tongue.  While some portion of this activity may be related to treatment and subsequent ulceration, the possibility of persistent disease is raised because of the intensity and distribution of the uptake.\par 2.  Scattered subcentimeter mediastinal and para-aortic lymph nodes. \par 3.  FDG uptake diffusely throughout the stomach and may represent infectious versus inflammatory gastritis.  Please note that a similar pattern was seen on the patient's prior PET scan dated 11/12/2018.\par \par CT neck with contrast 5/16/19 - Impression: There is now a large ulcer involving the left floor of mouth that is lined by heterogenously enhancing soft tissue that is FDG avid.  Findings are suspicious for disease persistence along the ulcer walls.  Correlation with visual inspection and possibly tissue sampling is recommended for further characterization.  Presumed lymphoproliferative disease in the orbits and cervical lymph nodes has nearly resolved.  \par \par He last saw Dr. Blandon one week ago.  Today, he reports that he has been "up and down" since we last saw him.  He states that approximately two weeks ago, he was experiencing bloody stool.  His wife called an ambulance and he was taken to the ED at [facility].  He states that he was found to be anemic with hemoglobin of 3 and received 3 blood transfusions.  He  underwent GI workup including upper endoscopy which showed bleeding ulcerations in the stomach; all pathology was benign.  He notes that he had been taking Aleve for pain prior to this episode and has since stopped.  He was discharged home 3 days later.  He was advised to take protonix daily and follow-up with GI.  He states that he has stopped protonix because he has not had any episodes of bleeding.  He did not keep his follow-up appointment with GI as he was not feeling well that day.  His wife states she plans to reschedule.  \par \par He notes continued left neck pain and edema relieved with warm compresses and tylenol.  He states that is has worsened over the past 2-3 weeks.  Of note, he also reports stopping gabapentin around that time because he didn't think it was helping and it is "just another thing."  He reports continued odynophagia which prevents him from eating solid food.  He is still on a liquid diet with ensure.  He states he lost 12 pounds during his recent hospitalization but has started gaining some weight again.  He reports that xerostomia has resolved.  He plans to follow-up with his PMD Dr. Mckeon this week and will also see Dr. Clifton today.  \par \par 4/2/19- FOLLOW UP\par Mr. Fuentes presents for follow up. When he was last seen on 3/19/19, he was volume depleted and continuing to report throat pain. Plan at the time was for labs with Dr. BOBBY, complete course of Diflucan, increase Ensure plus to 3 cans/ day, continue MMW and Gabapentin, stop Trazodone, continue VNS services, and follow up with SLP. \par \par He saw SLP on 3/28/19, at which time he recommended H&N exercises to improve swallow strength. Complains of irritation in throat but no pain. Stopped trazadone and finished course of diflucan. Reports more energy to walk and exercise. Able to tolerate 3 cans of ensure a day. No longer using MMW, says its effects don’t last long. Able to tolerate liquids and soft foods. Taste beginning to come back. Constipation persists. Seeing Dr. BOBBY today for eleno. Sees him weekly per wife. He decided to cancel his trip to [place] this week. \par \par 3/19/19 - FOLLOW UP\par Today Using MMW, Biotene, he reports throat pain still persist, not using gabapentin regularly, encouraged to use as ordered to get relief. His wife says she is now forcing him to use gabapentin and to use MMW regularly. She also made sure he started the fluconazole RX. Tolerating only liquids, soft foods like yoghurt, potatoes mashed, cream of wheat. Assessed by visiting nurse, will start PT and Sp/sw therapy next week. Carries out physical activity as tolerated, walks 4 blocks daily. Constipation is relieved by miralax.\par \par 3/5/19- Pt is here for a followup 3/5/19. Today he states he feels better, tolerates puree foods(warm cereal and soups), 2-3 Ensure shakes and whey protein recommended by Nutritionist. Increased PO fluids now to 2 Liters per day. Reports Painful throat while swallowing. Relieved by MMW and biotene. Denies any other pain. Visit with Dr Blandon today at 1pm, will review bloodwork after. ER Visit from Dr Blandon office on 2/26/19 for very low hemoglobin, received blood products, Iron and IVF. Says he is taking diflucan which will complete today (although we sent in 15 pills 1 week ago - wife will check # of pills at home). \par \par 2/26/19- PTE\par Mr. Fuentes returns for an emergent PTE. Several phone calls took place over the past few weeks after he completed RT regarding poor PO intake. He was Rx'd MMW. He continued to lose weight since completion of treatment. On the night of 2/18/18, he fell in the middle of the night (did not hit his head as per patient's wife) due to dizziness. He declined coming in last week for assessment/ labs; he and his wife reported improved PO intake. His wife Selma emailed over the weekend reporting another fall due to slipping on liquid on the floor. She stated he had increased his fluid intake and was doing quite well until the fall. They also called his PCP Dr. Mckeon to have the Trazodone prescription refilled as he had been having Restless Leg Syndrome most nights. Patient and his wife thought this was the cause of the second fall as he was quite stable in the day but became off balance in the night. He last saw Dr. Blandon on 2/15/19 and has a follow up scheduled for this Friday. \par \par Today, he reports he is eating and drinking better. However, he feels generally weak and also has lightheadedness with position changes. He admits that he did hit his head with both falls, but denies headaches, LOC, confusion. He has lost four pounds since last seen on 2/8, but it is an improvement since last week (he has gained 4 pounds since last week). He is drinking 2 Ensure plus per day. Also eating soft/ pureed foods, soups, broth. He reports sore throat that is improving; was 9/ 10 last week, but now 5/ 10. Using MMW, which is helpful. Also reports xerostomia, using Biotene but only once a day. Patient and wife inquired about Trazodone; he is taking 100 mg daily for restless leg syndrome as per his PCP, but it makes him feel "disoriented." \par \par ______________________________________________\par ONCOLOGIC HISTORY \par Mr. 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 Notably, he has PMH non-Hodgkin's lymphoma (patient unsure of which subtype), diagnosed in 1999, and treated by Dr. Shepherd with CHOP and Fludarabine. \par \par He was followed by Dr. Clifton (Head [facility] Neck) and had complaints of swelling and pain over the left submandibular gland in 2017. A CT scan of the neck done 12/12/17 showed no evidence of pharyngeal mass, asymmetry or laryngeal mass. There were numerous cervical lymph nodes which were larger on the left, as well as enlarged subclavian and axillary lymph nodes. \par \par He continued to have complaints of left neck pain and had occasional metallic taste in mouth in July 2018. He also developed a lisp/ difficulty speaking in July 2018.\par \par He underwent CT neck on 10/15/18, which showed the following:\par IMPRESSION: \par 1. Soft tissue mass involving the left tongue base compatible with a tongue base neoplasm\par 2. Superficial nodule involving the right cheek which may represent an enlarged periparotid lymph node measuring 2.0 x 1.1 cm in axial cross-section, minimally increased in size since the prior study\par 3. Scattered mildly enlarged lymph nodes in the neck again noted, relatively stable since the prior study\par 4. Abnormal soft tissue within the orbits, not significantly changed\par 5. Stable nodular densities within the right upper lung\par 6. Cervical spondylosis with canal and foraminal stenosis at C4-C5. \par \par He underwent a biopsy of the mass on 10/26/18 by Dr. Clifton which showed invasive SCC nonkeratinizing, poorly differentiated, p16 positive, HPV negative. Flow cytometry also done, but results were inconclusive.\par \par PET/CT ([facility])11/12/18\par IMPRESSION: \par 1. Robust lobular hypermetabolic soft tissue mass involving the tongue base consistent with known primary tongue base malignancy. It extends to the level of the hyoid without obvious involvement of the preepiglottic fat. If warranted, consider dedicated MR imaging for further assessment.\par 2. Assessment for metastatic regional lymph nodes from primary tongue malignancy is limited in this patient with evidence of low-grade lymphomatous involvement in the neck. Asymmetrically hypermetabolic but stable appearing lymph nodes in the left level III lymph node would be the most concerning for potential regional ozzy disease from tongue base malignancy.\par 3. Multiple homogeneous lymph nodes associated with low-grade uptake especially in the neck and chest and to a lesser extent the abdomen and pelvis is likely related to patient's history of non-Hodgkin's lymphoma. Associated metabolic activity is consistent with a low-grade viability.\par 4. No definite evidence to suggest focal splenic or marrow involvement of disease. Low-grade diffuse involvement is not excluded.\par 5. Stable appearance of peribronchial nodular foci in the right upper lobe, too small to characterize, but favoring a post inflammatory/infectious process. Other benign type changes are seen in the mid to lower lung fields, which can be reassessed at follow-up imaging.\par 6. No suspicious findings to suggest marginal or regional ozzy recurrence of prostate malignancy or evidence of osteoblastic metastatic disease.\par \par Today, he reports he feels generally well with exception of difficulty swallowing solid foods since the biopsy. He has been eating soups and soft foods. He also reports xerostomia and continues to have a lisp. He is mildly fatigue, but it improves with rest. He continues to work as a . He denies fever, chills, CP, SOB, N/V/D, decreased appetite. He has lost approx 6- 7 pounds since the biopsy on 10/26. Of note, he reports he is claustrophobic, has needed anxiolytics prior to imaging in the past. \par \par His dentist is Dr. Hager ([phone]) and he last saw Dr. Hager in September. \par Family history notable for brother with leukemia. \par \par

## 2020-07-13 NOTE — VITALS
[80: Normal activity with effort; some signs or symptoms of disease.] : 80: Normal activity with effort; some signs or symptoms of disease.  [ECOG Performance Status: 2 - Ambulatory and capable of all self care but unable to carry out any work activities] : Performance Status: 2 - Ambulatory and capable of all self care but unable to carry out any work activities. Up and about more than 50% of waking hours

## 2020-07-14 ENCOUNTER — APPOINTMENT (OUTPATIENT)
Dept: RADIATION ONCOLOGY | Facility: CLINIC | Age: 80
End: 2020-07-14
Payer: MEDICARE

## 2020-07-14 VITALS
WEIGHT: 166.4 LBS | DIASTOLIC BLOOD PRESSURE: 80 MMHG | OXYGEN SATURATION: 98 % | BODY MASS INDEX: 23.88 KG/M2 | RESPIRATION RATE: 15 BRPM | SYSTOLIC BLOOD PRESSURE: 124 MMHG | HEART RATE: 82 BPM

## 2020-07-14 DIAGNOSIS — R47.1 DYSARTHRIA AND ANARTHRIA: ICD-10-CM

## 2020-07-14 PROCEDURE — 99214 OFFICE O/P EST MOD 30 MIN: CPT

## 2020-07-14 RX ORDER — TRAZODONE HYDROCHLORIDE 300 MG/1
TABLET ORAL
Refills: 0 | Status: ACTIVE | COMMUNITY

## 2020-07-14 RX ORDER — PANTOPRAZOLE 40 MG/1
40 TABLET, DELAYED RELEASE ORAL
Refills: 0 | Status: ACTIVE | COMMUNITY

## 2020-07-16 NOTE — REVIEW OF SYSTEMS
[Negative] : Cardiovascular [Fatigue: Grade 0] : Fatigue: Grade 0 [Xerostomia: Grade 1 - Symptomatic (e.g., dry or thick saliva) without significant dietary alteration; unstimulated saliva flow >0.2 ml/min] : Xerostomia: Grade 1 - Symptomatic (e.g., dry or thick saliva) without significant dietary alteration; unstimulated saliva flow >0.2 ml/min [FreeTextEntry4] : see HPI [FreeTextEntry7] : see HPI

## 2020-07-16 NOTE — HISTORY OF PRESENT ILLNESS
[FreeTextEntry1] : Mr. Nathaniel Fuentes completed radiation therapy to the oropharynx/ neck for [id number] base of tongue SCC, 7000 cGy over 35 fractions from 12/19/18 to 2/8/19.  He received 5 out of 7 cycles of concurrent weekly Cisplatin with Dr. Blandon.  He had a complicated post-CRT course detailed in prior notes and below.  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  He completed radiation therapy 2700 cGy / 3000 cGy to the stomach from 7/9/19 - 8/21/19. Treatment was interrupted briefly (7/11-7/12/19) while he was admitted to [facility] for bleeding from the mouth. He also elected to take a treatment from 7/29 - 8/7/19 due to personal/work obligations. He also chose to terminate treatment early. \par \par 7/14/20 - Follow-up\claude Armstrong returns for routine follow-up.  He was last seen on 1/16/20.  Plan was for PET/CT and f/u with Dr. Blandon, f/u with Dr. Miles, f/u with Dr. Vazquez at [place] for EGD in February/March, f/u with PCP regarding TSH, f/u with dental, head and neck exercises.  He last saw Dr. Blandon 1 month ago.  He last saw Dr. Miles on 6/17/20, plan is for f/u in February 2021.  He had an EGD with Dr. Peña at [place] recently, which was reportedly normal.  We will request the reports.  He last saw his PCP in November, did not follow-up on TSH.  He has not seen dental, as they have been closed due to COVID.  \par \par Today, he reports feeling well overall.  He is eating a regular diet and continues to gain weight.  He notes some dysphagia with dry foods and sensitivity to extreme temperatures.  Denies pain and odynophagia.  Mild xerostomia relieved with drinking water.  Taste is 100%.  He notes teeth staining. \par \par 1/16/20 - Follow-up\par Mr. Fuentes returns for routine follow-up.  He was last seen by Dr. Duong on 9/25/19.  Plan was for f/u with Dr. Blandon and Dr. Miles and f/u with PMD regarding synthroid.  He last saw Dr. Blandon 2 weeks ago.  He states he is scheduled for a PET/CT next week and follow-up with Dr. Blandon.  He last saw Dr. Miles on 11/11/19.  He is scheduled to follow-up in March.  He has not followed up with his PCP regarding synthroid.  He states he took the medication until his supply ran out.  He has not had TSH checked.  He has not followed up with GI.  He has not seen the dentist since prior to radiation.  He saw SLP on 9/17/19 and was recommended MBS, which has not been done.  \par \par CT neck with contrast 1/8/20 - IMPRESSION: There is no evidence of local or regional ozzy recurrence of oral cavity/oropharyngeal carcinoma. 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 Right upper lobe nodules are unchanged since prior study of 7/2019. If further characterization is desired, recommend correlation with dedicated chest CT or with PET/CT. \par \par Today, he reports feeling well.  He states he no longer feels the effects of radiation.  He denies dysphagia and odynophagia.  He is eating a regular diet and has gained 20 pounds.  He notes his left tongue still feels "a bit raw" but is improving.  He denies abdominal pain, nausea, vomiting, constipation, diarrhea, melena and hematochezia.  \par \par 9/25/19 - PTE\par Mr. Fuentes returns today for post-treatment evaluation. He states he was admitted to [facility] on 8/23 with a GI bleed and hemoglobin of 4.3.  He and his wife state that he underwent EGD and was found to have a large bleeding gastric ulcer.  They state that pathology was negative for infection and malignancy.  He was started on Carafate and Protonix and is scheduled to follow-up with GI Dr. Doty at [facility] on 10/3.  He was also found to be hypothyroid during that admission and started on Synthroid.  \par \par He completed his 15 day course of keflex.  He last saw Dr. Miles on 9/9/19.  According to Dr. Miles's note, the erosive area in the left BOT is almost completely healed.  He is scheduled to f/u with Dr. Miles in November.  He saw [facility] on 9/17.  He last saw Dr. Blandon yesterday.  Today, he reports feeling well.  He states he hasn't "felt this good since January."  He denies oral/neck pain, dysphagia, odynophagia, abdominal pain, nausea, vomiting.  \par ________________________________________\par 7/8/19 - Follow-up - Mr. Fuentes was scheduled to begin RT to the stomach today.  He did not take his prophylactic zofran this morning, so declined treatment today.  He has opted to begin treatment tomorrow.  He and his wife present to clinic today for follow-up of his left base of tongue ulcer.  They state they have been seeing Dr. Clifton recently, and report that she has been happy with the progress of the ulcer.  He continues to have neck pain, which has improved significantly with oxycodone.  They plan to get a second opinion from Dr. Miles regarding his ongoing neck pain.  They also have some questions about his medications.  He points to his pain as bilateral and essentially overlying the hyoid bone.   he is able to swallow.   taste is poot\par \par 6/13/19 - SNA\par Mr. Fuentes returns for SNA.  He was last seen here on 6/3/19 at which time he was found to have a deep BOT ulcer with heaped edges on clinical exam.  PET/CT and CT were also concerning for uptake in BOT.  He was given a course of diflucan, magic mouthwash, and advised to use baking soda and meat tenderizer oral rinses and restart gabapentin and protonix.  He was also to be evaluated by Dr. Clifton for possible biopsy - she has decided to re-evaluate before deciding on biopsy.  \par \par Upper Endoscopy was done at [facility] (Dr. Santamaria) for Melena on 5/22/19; report shows "diffuse severe mucoosal changes characterized by granularity, inflammation, and nodularity were found in the cardia, in the gastric fundus, and gastric body. 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 Pylori and focal granulation tissue and fibrinopurulent debris. [facility] Path review pending.  He saw Dr. Blandon on 6/11/19 and discussed chemo vs. RT.  H/H was 12.0/38.3 on 6/11 (after Procrit).  \par \par He is here today for consideration of radiation therapy.  He states that his right facial pain is unchanged.  He will complete the course of diflucan in 3 days and is using mouth rinses with the exception of magic mouthwash.  He was encouraged to start using it.  He notes that he is "here for another reason" today.  He denies GI complaints including abdominal pain, nausea, vomiting, constipation, diarrhea, melena, hematemesis.  He has not yet followed up with GI for colonoscopy.  \par \par 6/3/19 - Follow-up\par Mr. Fuentes presents today for routine follow-up.  He was last seen here on 4/2/19.  Plan at that time was for PET/CT and CT neck with contrast in 6 weeks, continue weekly visits with Dr. Blandon, continue Ensure 3 cans/day and increase calories, continue gabapentin (300mg AM, 600mg PM), stop trazodone, continue VNS and SLP and exercises.  \par \par PET/CT 5/16/19 - Impression: \par 1. Increased FDG uptake within the root of the tongue.  While some portion of this activity may be related to treatment and subsequent ulceration, the possibility of persistent disease is raised because of the intensity and distribution of the uptake.\par 2.  Scattered subcentimeter mediastinal and para-aortic lymph nodes. \par 3.  FDG uptake diffusely throughout the stomach and may represent infectious versus inflammatory gastritis.  Please note that a similar pattern was seen on the patient's prior PET scan dated 11/12/2018.\par \par CT neck with contrast 5/16/19 - Impression: There is now a large ulcer involving the left floor of mouth that is lined by heterogenously enhancing soft tissue that is FDG avid.  Findings are suspicious for disease persistence along the ulcer walls.  Correlation with visual inspection and possibly tissue sampling is recommended for further characterization.  Presumed lymphoproliferative disease in the orbits and cervical lymph nodes has nearly resolved.  \par \par He last saw Dr. Blandon one week ago.  Today, he reports that he has been "up and down" since we last saw him.  He states that approximately two weeks ago, he was experiencing bloody stool.  His wife called an ambulance and he was taken to the ED at [facility].  He states that he was found to be anemic with hemoglobin of 3 and received 3 blood transfusions.  He  underwent GI workup including upper endoscopy which showed bleeding ulcerations in the stomach; all pathology was benign.  He notes that he had been taking Aleve for pain prior to this episode and has since stopped.  He was discharged home 3 days later.  He was advised to take protonix daily and follow-up with GI.  He states that he has stopped protonix because he has not had any episodes of bleeding.  He did not keep his follow-up appointment with GI as he was not feeling well that day.  His wife states she plans to reschedule.  \par \par He notes continued left neck pain and edema relieved with warm compresses and tylenol.  He states that is has worsened over the past 2-3 weeks.  Of note, he also reports stopping gabapentin around that time because he didn't think it was helping and it is "just another thing."  He reports continued odynophagia which prevents him from eating solid food.  He is still on a liquid diet with ensure.  He states he lost 12 pounds during his recent hospitalization but has started gaining some weight again.  He reports that xerostomia has resolved.  He plans to follow-up with his PMD Dr. Mckeon this week and will also see Dr. Clifton today.  \par \par 4/2/19- FOLLOW UP\par Mr. Fuentes presents for follow up. When he was last seen on 3/19/19, he was volume depleted and continuing to report throat pain. Plan at the time was for labs with Dr. BOBBY, complete course of Diflucan, increase Ensure plus to 3 cans/ day, continue MMW and Gabapentin, stop Trazodone, continue VNS services, and follow up with SLP. \par \par He saw SLP on 3/28/19, at which time he recommended H&N exercises to improve swallow strength. Complains of irritation in throat but no pain. Stopped trazadone and finished course of diflucan. Reports more energy to walk and exercise. Able to tolerate 3 cans of ensure a day. No longer using MMW, says its effects don’t last long. Able to tolerate liquids and soft foods. Taste beginning to come back. Constipation persists. Seeing Dr. BOBBY today for procrit. Sees him weekly per wife. He decided to cancel his trip to [place] this week. \par \par 3/19/19 - FOLLOW UP\par Today Using MMW, Biotene, he reports throat pain still persist, not using gabapentin regularly, encouraged to use as ordered to get relief. His wife says she is now forcing him to use gabapentin and to use MMW regularly. She also made sure he started the fluconazole RX. Tolerating only liquids, soft foods like yoghurt, potatoes mashed, cream of wheat. Assessed by visiting nurse, will start PT and Sp/sw therapy next week. Carries out physical activity as tolerated, walks 4 blocks daily. Constipation is relieved by miralax.\par \par 3/5/19- Pt is here for a followup 3/5/19. Today he states he feels better, tolerates puree foods(warm cereal and soups), 2-3 Ensure shakes and whey protein recommended by Nutritionist. Increased PO fluids now to 2 Liters per day. Reports Painful throat while swallowing. Relieved by MMW and biotene. Denies any other pain. Visit with Dr Blandon today at 1pm, will review bloodwork after. ER Visit from Dr Blandon office on 2/26/19 for very low hemoglobin, received blood products, Iron and IVF. Says he is taking diflucan which will complete today (although we sent in 15 pills 1 week ago - wife will check # of pills at home). \par \par 2/26/19- PTE\par Mr. Fuentes returns for an emergent PTE. Several phone calls took place over the past few weeks after he completed RT regarding poor PO intake. He was Rx'd MMW. He continued to lose weight since completion of treatment. On the night of 2/18/18, he fell in the middle of the night (did not hit his head as per patient's wife) due to dizziness. He declined coming in last week for assessment/ labs; he and his wife reported improved PO intake. His wife Selma emailed over the weekend reporting another fall due to slipping on liquid on the floor. She stated he had increased his fluid intake and was doing quite well until the fall. They also called his PCP Dr. Mckeon to have the Trazodone prescription refilled as he had been having Restless Leg Syndrome most nights. Patient and his wife thought this was the cause of the second fall as he was quite stable in the day but became off balance in the night. He last saw Dr. Blandon on 2/15/19 and has a follow up scheduled for this Friday. \par \par Today, he reports he is eating and drinking better. However, he feels generally weak and also has lightheadedness with position changes. He admits that he did hit his head with both falls, but denies headaches, LOC, confusion. He has lost four pounds since last seen on 2/8, but it is an improvement since last week (he has gained 4 pounds since last week). He is drinking 2 Ensure plus per day. Also eating soft/ pureed foods, soups, broth. He reports sore throat that is improving; was 9/ 10 last week, but now 5/ 10. Using MMW, which is helpful. Also reports xerostomia, using Biotene but only once a day. Patient and wife inquired about Trazodone; he is taking 100 mg daily for restless leg syndrome as per his PCP, but it makes him feel "disoriented." \par \par ______________________________________________\par ONCOLOGIC HISTORY \par Mr. 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 Notably, he has PMH non-Hodgkin's lymphoma (patient unsure of which subtype), diagnosed in 1999, and treated by Dr. Shepherd with CHOP and Fludarabine. \par \par He was followed by Dr. Clifton (Head & Neck) and had complaints of swelling and pain over the left submandibular gland in 2017. A CT scan of the neck done 12/12/17 showed no evidence of pharyngeal mass, asymmetry or laryngeal mass. There were numerous cervical lymph nodes which were larger on the left, as well as enlarged subclavian and axillary lymph nodes. \par \par He continued to have complaints of left neck pain and had occasional metallic taste in mouth in July 2018. He also developed a lisp/ difficulty speaking in July 2018.\par \par He underwent CT neck on 10/15/18, which showed the following:\par IMPRESSION: \par 1. Soft tissue mass involving the left tongue base compatible with a tongue base neoplasm\par 2. Superficial nodule involving the right cheek which may represent an enlarged periparotid lymph node measuring 2.0 x 1.1 cm in axial cross-section, minimally increased in size since the prior study\par 3. Scattered mildly enlarged lymph nodes in the neck again noted, relatively stable since the prior study\par 4. Abnormal soft tissue within the orbits, not significantly changed\par 5. Stable nodular densities within the right upper lung\par 6. Cervical spondylosis with canal and foraminal stenosis at C4-C5. \par \par He underwent a biopsy of the mass on 10/26/18 by Dr. Clifton which showed invasive SCC nonkeratinizing, poorly differentiated, p16 positive, HPV negative. Flow cytometry also done, but results were inconclusive.\par \par PET/CT ([facility])11/12/18\par IMPRESSION: \par 1. Robust lobular hypermetabolic soft tissue mass involving the tongue base consistent with known primary tongue base malignancy. It extends to the level of the hyoid without obvious involvement of the preepiglottic fat. If warranted, consider dedicated MR imaging for further assessment.\par 2. Assessment for metastatic regional lymph nodes from primary tongue malignancy is limited in this patient with evidence of low-grade lymphomatous involvement in the neck. Asymmetrically hypermetabolic but stable appearing lymph nodes in the left level III lymph node would be the most concerning for potential regional ozzy disease from tongue base malignancy.\par 3. Multiple homogeneous lymph nodes associated with low-grade uptake especially in the neck and chest and to a lesser extent the abdomen and pelvis is likely related to patient's history of non-Hodgkin's lymphoma. Associated metabolic activity is consistent with a low-grade viability.\par 4. No definite evidence to suggest focal splenic or marrow involvement of disease. Low-grade diffuse involvement is not excluded.\par 5. Stable appearance of peribronchial nodular foci in the right upper lobe, too small to characterize, but favoring a post inflammatory/infectious process. Other benign type changes are seen in the mid to lower lung fields, which can be reassessed at follow-up imaging.\par 6. No suspicious findings to suggest marginal or regional ozzy recurrence of prostate malignancy or evidence of osteoblastic metastatic disease.\par \par Today, he reports he feels generally well with exception of difficulty swallowing solid foods since the biopsy. He has been eating soups and soft foods. He also reports xerostomia and continues to have a lisp. He is mildly fatigue, but it improves with rest. He continues to work as a . He denies fever, chills, CP, SOB, N/V/D, decreased appetite. He has lost approx 6- 7 pounds since the biopsy on 10/26. Of note, he reports he is claustrophobic, has needed anxiolytics prior to imaging in the past. \par \par His dentist is Dr. Hager ([phone]) and he last saw Dr. Hager in September. \par Family history notable for brother with leukemia. \par \par

## 2020-07-16 NOTE — PHYSICAL EXAM
[Examination Of The Oral Cavity] : the lips and gums were normal [Normal Oral Cavity] : oral cavity was normal [Respiration, Rhythm And Depth] : normal respiratory rhythm and effort [Exaggerated Use Of Accessory Muscles For Inspiration] : no accessory muscle use [Nondistended] : nondistended [Abdomen Soft] : soft [Cervical Lymph Nodes Enlarged Posterior Bilaterally] : posterior cervical [Cervical Lymph Nodes Enlarged Anterior Bilaterally] : anterior cervical [Oriented To Time, Place, And Person] : oriented to person, place, and time [Supraclavicular Lymph Nodes Enlarged Bilaterally] : supraclavicular [Skin Color & Pigmentation] : normal skin color and pigmentation [Normal] : the ears, nose and oropharynx were normal in appearance [de-identified] : grade 1 fibrosis right neck

## 2020-11-30 ENCOUNTER — APPOINTMENT (OUTPATIENT)
Dept: RADIATION ONCOLOGY | Facility: CLINIC | Age: 80
End: 2020-11-30
Payer: MEDICARE

## 2020-11-30 VITALS
DIASTOLIC BLOOD PRESSURE: 76 MMHG | RESPIRATION RATE: 16 BRPM | OXYGEN SATURATION: 98 % | BODY MASS INDEX: 25.11 KG/M2 | HEART RATE: 74 BPM | WEIGHT: 175 LBS | SYSTOLIC BLOOD PRESSURE: 128 MMHG

## 2020-11-30 PROCEDURE — 31575 DIAGNOSTIC LARYNGOSCOPY: CPT

## 2020-11-30 PROCEDURE — 99214 OFFICE O/P EST MOD 30 MIN: CPT | Mod: 25

## 2020-11-30 NOTE — HISTORY OF PRESENT ILLNESS
[FreeTextEntry1] : Mr. Nathaniel Fuentes completed radiation therapy to the oropharynx/ neck for [id number] base of tongue SCC, 7000 cGy over 35 fractions from 12/19/18 to 2/8/19.  He received 5 out of 7 cycles of concurrent weekly Cisplatin with Dr. Blandon.  He had a complicated post-CRT course detailed in prior notes and below.  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  He completed radiation therapy 2700 cGy / 3000 cGy to the stomach from 7/9/19 - 8/21/19. Treatment was interrupted briefly (7/11-7/12/19) while he was admitted to [facility] for bleeding from the mouth. He also elected to take a treatment from 7/29 - 8/7/19 due to personal/work obligations. He also chose to terminate treatment early. \par \par 11/30/20 - Follow-up\par Mr. Fuentes returns for routine follow-up.  He was last seen on 7/14/20.  Plan was for f/u with Dr. Blandon, f/u with GI Dr. Peña, f/u with Dr. Miles in February 2021, f/u with PCP regarding TSH, f/u with SLP, head and neck exercises, RTC in 4 months.  He last saw Dr. Blandon 3 weeks ago.  He last saw Dr. Peña "a long time ago".  He last saw his PCP 3 months ago.  He states his TSH was rechecked and it was normal.  He has not seen SLP.  Today, he reports feeling well, "a solid 9/10".  He has good energy, is exercising and golfing.  He is eating a regular diet, full meals.  Taste is normal.  Mild dysphagia with dry foods.  Speech is still slurred, which he attributes to "saliva control", not bothersome to him.  He denies pain, odynophagia, nausea, vomiting, constipation, diarrhea, melena.  \par \par PET/CT 10/19/20 ([facility])\par - There is significant axillary, retroperitoneal, as well as subcutaneous lymphadenopathy.  This may be compatible with lymphoma.  This raises the possibility of two different kinds of lymphoma.  Correlation with clinical and laboratory values recommended.  \par - There is splenomegaly.  Status post cholecystectomy.  Correlate if patient has had any bowel or appendix surgery.  \par - Right sided surgical clips noted. \par - Enlarged prostate. \par - Complex left renal cyst.  This could be durther evaluation with dedicated MRI of the abdomen pre and post contrast if stll cinically needed. \par - Lung interstitial markings, probably inflammatory versus infectious. \par - Spiculated left upper lobe nodule.  Correlate with any prior imaging is recommended.  If not available, follow-up CT of the chest in approximately 3 months is recommended. \par \par 7/14/20 - Follow-up\claude Armstrong returns for routine follow-up.  He was last seen on 1/16/20.  Plan was for PET/CT and f/u with Dr. Blandon, f/u with Dr. Miles, f/u with Dr. Vazquez at [place] for EGD in February/March, f/u with PCP regarding TSH, f/u with dental, head and neck exercises.  He last saw Dr. Blandon 1 month ago.  He last saw Dr. Miles on 6/17/20, plan is for f/u in February 2021.  He had an EGD with Dr. ePña at [place] recently, which was reportedly normal.  We will request the reports.  He last saw his PCP in November, did not follow-up on TSH.  He has not seen dental, as they have been closed due to COVID.  \par \par Today, he reports feeling well overall.  He is eating a regular diet and continues to gain weight.  He notes some dysphagia with dry foods and sensitivity to extreme temperatures.  Denies pain and odynophagia.  Mild xerostomia relieved with drinking water.  Taste is 100%.  He notes teeth staining. \par \par 1/16/20 - Follow-up\par Mr. Fuentes returns for routine follow-up.  He was last seen by Dr. Duong on 9/25/19.  Plan was for f/u with Dr. Blandon and Dr. Miles and f/u with PMD regarding synthroid.  He last saw Dr. Blandon 2 weeks ago.  He states he is scheduled for a PET/CT next week and follow-up with Dr. Blandon.  He last saw Dr. Miles on 11/11/19.  He is scheduled to follow-up in March.  He has not followed up with his PCP regarding synthroid.  He states he took the medication until his supply ran out.  He has not had TSH checked.  He has not followed up with GI.  He has not seen the dentist since prior to radiation.  He saw SLP on 9/17/19 and was recommended MBS, which has not been done.  \par \par CT neck with contrast 1/8/20 - IMPRESSION: There is no evidence of local or regional ozzy recurrence of oral cavity/oropharyngeal carcinoma. 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 Right upper lobe nodules are unchanged since prior study of 7/2019. If further characterization is desired, recommend correlation with dedicated chest CT or with PET/CT. \par \par Today, he reports feeling well.  He states he no longer feels the effects of radiation.  He denies dysphagia and odynophagia.  He is eating a regular diet and has gained 20 pounds.  He notes his left tongue still feels "a bit raw" but is improving.  He denies abdominal pain, nausea, vomiting, constipation, diarrhea, melena and hematochezia.  \par \par 9/25/19 - PTE\par Mr. Fuentes returns today for post-treatment evaluation. He states he was admitted to [facility] on 8/23 with a GI bleed and hemoglobin of 4.3.  He and his wife state that he underwent EGD and was found to have a large bleeding gastric ulcer.  They state that pathology was negative for infection and malignancy.  He was started on Carafate and Protonix and is scheduled to follow-up with GI Dr. Doty at [facility] on 10/3.  He was also found to be hypothyroid during that admission and started on Synthroid.  \par \par He completed his 15 day course of keflex.  He last saw Dr. Miles on 9/9/19.  According to Dr. Miles's note, the erosive area in the left BOT is almost completely healed.  He is scheduled to f/u with Dr. Miles in November.  He saw SLP on 9/17.  He last saw Dr. Blandon yesterday.  Today, he reports feeling well.  He states he hasn't "felt this good since January."  He denies oral/neck pain, dysphagia, odynophagia, abdominal pain, nausea, vomiting.  \par ________________________________________\par 7/8/19 - Follow-up - Mr. Fuentes was scheduled to begin RT to the stomach today.  He did not take his prophylactic zofran this morning, so declined treatment today.  He has opted to begin treatment tomorrow.  He and his wife present to clinic today for follow-up of his left base of tongue ulcer.  They state they have been seeing Dr. Clifton recently, and report that she has been happy with the progress of the ulcer.  He continues to have neck pain, which has improved significantly with oxycodone.  They plan to get a second opinion from Dr. Miles regarding his ongoing neck pain.  They also have some questions about his medications.  He points to his pain as bilateral and essentially overlying the hyoid bone.   he is able to swallow.   taste is poot\par \par 6/13/19 - SNA\par Mr. Fuentes returns for SNA.  He was last seen here on 6/3/19 at which time he was found to have a deep BOT ulcer with heaped edges on clinical exam.  PET/CT and CT were also concerning for uptake in BOT.  He was given a course of diflucan, magic mouthwash, and advised to use baking soda and meat tenderizer oral rinses and restart gabapentin and protonix.  He was also to be evaluated by Dr. Clifton for possible biopsy - she has decided to re-evaluate before deciding on biopsy.  \par \par Upper Endoscopy was done at [facility] (Dr. Santamaria) for Melena on 5/22/19; report shows "diffuse severe mucoosal changes characterized by granularity, inflammation, and nodularity were found in the cardia, in the gastric fundus, and gastric body. 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 Pylori and focal granulation tissue and fibrinopurulent debris. [facility] Path review pending.  He saw Dr. Blandon on 6/11/19 and discussed chemo vs. RT.  H/H was 12.0/38.3 on 6/11 (after Procrit).  \par \par He is here today for consideration of radiation therapy.  He states that his right facial pain is unchanged.  He will complete the course of diflucan in 3 days and is using mouth rinses with the exception of magic mouthwash.  He was encouraged to start using it.  He notes that he is "here for another reason" today.  He denies GI complaints including abdominal pain, nausea, vomiting, constipation, diarrhea, melena, hematemesis.  He has not yet followed up with GI for colonoscopy.  \par \par 6/3/19 - Follow-up\par Mr. Fuentes presents today for routine follow-up.  He was last seen here on 4/2/19.  Plan at that time was for PET/CT and CT neck with contrast in 6 weeks, continue weekly visits with Dr. Blandon, continue Ensure 3 cans/day and increase calories, continue gabapentin (300mg AM, 600mg PM), stop trazodone, continue VNS and SLP and exercises.  \par \par PET/CT 5/16/19 - Impression: \par 1. Increased FDG uptake within the root of the tongue.  While some portion of this activity may be related to treatment and subsequent ulceration, the possibility of persistent disease is raised because of the intensity and distribution of the uptake.\par 2.  Scattered subcentimeter mediastinal and para-aortic lymph nodes. \par 3.  FDG uptake diffusely throughout the stomach and may represent infectious versus inflammatory gastritis.  Please note that a similar pattern was seen on the patient's prior PET scan dated 11/12/2018.\par \par CT neck with contrast 5/16/19 - Impression: There is now a large ulcer involving the left floor of mouth that is lined by heterogenously enhancing soft tissue that is FDG avid.  Findings are suspicious for disease persistence along the ulcer walls.  Correlation with visual inspection and possibly tissue sampling is recommended for further characterization.  Presumed lymphoproliferative disease in the orbits and cervical lymph nodes has nearly resolved.  \par \par He last saw Dr. Blandon one week ago.  Today, he reports that he has been "up and down" since we last saw him.  He states that approximately two weeks ago, he was experiencing bloody stool.  His wife called an ambulance and he was taken to the ED at [facility].  He states that he was found to be anemic with hemoglobin of 3 and received 3 blood transfusions.  He  underwent GI workup including upper endoscopy which showed bleeding ulcerations in the stomach; all pathology was benign.  He notes that he had been taking Aleve for pain prior to this episode and has since stopped.  He was discharged home 3 days later.  He was advised to take protonix daily and follow-up with GI.  He states that he has stopped protonix because he has not had any episodes of bleeding.  He did not keep his follow-up appointment with GI as he was not feeling well that day.  His wife states she plans to reschedule.  \par \par He notes continued left neck pain and edema relieved with warm compresses and tylenol.  He states that is has worsened over the past 2-3 weeks.  Of note, he also reports stopping gabapentin around that time because he didn't think it was helping and it is "just another thing."  He reports continued odynophagia which prevents him from eating solid food.  He is still on a liquid diet with ensure.  He states he lost 12 pounds during his recent hospitalization but has started gaining some weight again.  He reports that xerostomia has resolved.  He plans to follow-up with his PMD Dr. Mckeon this week and will also see Dr. Clifton today.  \par \par 4/2/19- FOLLOW UP\par Mr. Fuentes presents for follow up. When he was last seen on 3/19/19, he was volume depleted and continuing to report throat pain. Plan at the time was for labs with Dr. BOBBY, complete course of Diflucan, increase Ensure plus to 3 cans/ day, continue MMW and Gabapentin, stop Trazodone, continue VNS services, and follow up with SLP. \par \par He saw SLP on 3/28/19, at which time he recommended H&N exercises to improve swallow strength. Complains of irritation in throat but no pain. Stopped trazadone and finished course of diflucan. Reports more energy to walk and exercise. Able to tolerate 3 cans of ensure a day. No longer using MMW, says its effects don’t last long. Able to tolerate liquids and soft foods. Taste beginning to come back. Constipation persists. Seeing Dr. BOBBY today for procrit. Sees him weekly per wife. He decided to cancel his trip to [place] this week. \par \par 3/19/19 - FOLLOW UP\par Today Using MMW, Biotene, he reports throat pain still persist, not using gabapentin regularly, encouraged to use as ordered to get relief. His wife says she is now forcing him to use gabapentin and to use MMW regularly. She also made sure he started the fluconazole RX. Tolerating only liquids, soft foods like yoghurt, potatoes mashed, cream of wheat. Assessed by visiting nurse, will start PT and Sp/sw therapy next week. Carries out physical activity as tolerated, walks 4 blocks daily. Constipation is relieved by miralax.\par \par 3/5/19- Pt is here for a followup 3/5/19. Today he states he feels better, tolerates puree foods(warm cereal and soups), 2-3 Ensure shakes and whey protein recommended by Nutritionist. Increased PO fluids now to 2 Liters per day. Reports Painful throat while swallowing. Relieved by MMW and biotene. Denies any other pain. Visit with Dr Blandon today at 1pm, will review bloodwork after. ER Visit from Dr Blandon office on 2/26/19 for very low hemoglobin, received blood products, Iron and IVF. Says he is taking diflucan which will complete today (although we sent in 15 pills 1 week ago - wife will check # of pills at home). \par \par 2/26/19- PTE\par Mr. Fuentes returns for an emergent PTE. Several phone calls took place over the past few weeks after he completed RT regarding poor PO intake. He was Rx'd MMW. He continued to lose weight since completion of treatment. On the night of 2/18/18, he fell in the middle of the night (did not hit his head as per patient's wife) due to dizziness. He declined coming in last week for assessment/ labs; he and his wife reported improved PO intake. His wife Selma emailed over the weekend reporting another fall due to slipping on liquid on the floor. She stated he had increased his fluid intake and was doing quite well until the fall. They also called his PCP Dr. Mckeon to have the Trazodone prescription refilled as he had been having Restless Leg Syndrome most nights. Patient and his wife thought this was the cause of the second fall as he was quite stable in the day but became off balance in the night. He last saw Dr. Blandon on 2/15/19 and has a follow up scheduled for this Friday. \par \par Today, he reports he is eating and drinking better. However, he feels generally weak and also has lightheadedness with position changes. He admits that he did hit his head with both falls, but denies headaches, LOC, confusion. He has lost four pounds since last seen on 2/8, but it is an improvement since last week (he has gained 4 pounds since last week). He is drinking 2 Ensure plus per day. Also eating soft/ pureed foods, soups, broth. He reports sore throat that is improving; was 9/ 10 last week, but now 5/ 10. Using MMW, which is helpful. Also reports xerostomia, using Biotene but only once a day. Patient and wife inquired about Trazodone; he is taking 100 mg daily for restless leg syndrome as per his PCP, but it makes him feel "disoriented." \par \par ______________________________________________\par ONCOLOGIC HISTORY \par Mr. 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 Notably, he has PMH non-Hodgkin's lymphoma (patient unsure of which subtype), diagnosed in 1999, and treated by Dr. Shepherd with CHOP and Fludarabine. \par \par He was followed by Dr. Clifton (Head & Neck) and had complaints of swelling and pain over the left submandibular gland in 2017. A CT scan of the neck done 12/12/17 showed no evidence of pharyngeal mass, asymmetry or laryngeal mass. There were numerous cervical lymph nodes which were larger on the left, as well as enlarged subclavian and axillary lymph nodes. \par \par He continued to have complaints of left neck pain and had occasional metallic taste in mouth in July 2018. He also developed a lisp/ difficulty speaking in July 2018.\par \par He underwent CT neck on 10/15/18, which showed the following:\par IMPRESSION: \par 1. Soft tissue mass involving the left tongue base compatible with a tongue base neoplasm\par 2. Superficial nodule involving the right cheek which may represent an enlarged periparotid lymph node measuring 2.0 x 1.1 cm in axial cross-section, minimally increased in size since the prior study\par 3. Scattered mildly enlarged lymph nodes in the neck again noted, relatively stable since the prior study\par 4. Abnormal soft tissue within the orbits, not significantly changed\par 5. Stable nodular densities within the right upper lung\par 6. Cervical spondylosis with canal and foraminal stenosis at C4-C5. \par \par He underwent a biopsy of the mass on 10/26/18 by Dr. Clifton which showed invasive SCC nonkeratinizing, poorly differentiated, p16 positive, HPV negative. Flow cytometry also done, but results were inconclusive.\par \par PET/CT ([facility])11/12/18\par IMPRESSION: \par 1. Robust lobular hypermetabolic soft tissue mass involving the tongue base consistent with known primary tongue base malignancy. It extends to the level of the hyoid without obvious involvement of the preepiglottic fat. If warranted, consider dedicated MR imaging for further assessment.\par 2. Assessment for metastatic regional lymph nodes from primary tongue malignancy is limited in this patient with evidence of low-grade lymphomatous involvement in the neck. Asymmetrically hypermetabolic but stable appearing lymph nodes in the left level III lymph node would be the most concerning for potential regional ozzy disease from tongue base malignancy.\par 3. Multiple homogeneous lymph nodes associated with low-grade uptake especially in the neck and chest and to a lesser extent the abdomen and pelvis is likely related to patient's history of non-Hodgkin's lymphoma. Associated metabolic activity is consistent with a low-grade viability.\par 4. No definite evidence to suggest focal splenic or marrow involvement of disease. Low-grade diffuse involvement is not excluded.\par 5. Stable appearance of peribronchial nodular foci in the right upper lobe, too small to characterize, but favoring a post inflammatory/infectious process. Other benign type changes are seen in the mid to lower lung fields, which can be reassessed at follow-up imaging.\par 6. No suspicious findings to suggest marginal or regional ozzy recurrence of prostate malignancy or evidence of osteoblastic metastatic disease.\par \par Today, he reports he feels generally well with exception of difficulty swallowing solid foods since the biopsy. He has been eating soups and soft foods. He also reports xerostomia and continues to have a lisp. He is mildly fatigue, but it improves with rest. He continues to work as a . He denies fever, chills, CP, SOB, N/V/D, decreased appetite. He has lost approx 6- 7 pounds since the biopsy on 10/26. Of note, he reports he is claustrophobic, has needed anxiolytics prior to imaging in the past. \par \par His dentist is Dr. Hager ([phone]) and he last saw Dr. Hager in September. \par Family history notable for brother with leukemia. \par \par

## 2020-11-30 NOTE — PHYSICAL EXAM
[Examination Of The Oral Cavity] : the lips and gums were normal [Normal Oral Cavity] : oral cavity was normal [Abdomen Soft] : soft [Nondistended] : nondistended [Cervical Lymph Nodes Enlarged Posterior Bilaterally] : posterior cervical [Cervical Lymph Nodes Enlarged Anterior Bilaterally] : anterior cervical [Supraclavicular Lymph Nodes Enlarged Bilaterally] : supraclavicular [Skin Color & Pigmentation] : normal skin color and pigmentation [Oriented To Time, Place, And Person] : oriented to person, place, and time [Normal] : no respiratory distress, lungs were clear to auscultation bilaterally [Heart Rate And Rhythm] : heart rate and rhythm were normal [Heart Sounds] : normal S1 and S2 [Abdomen Tenderness] : non-tender [de-identified] : tongue with posterior/midline volume loss. soft on palpation. left tongue atrophy. left posterior hemitongue is fixed, no movement to left. good movement to right.  [de-identified] : grade 1 fibrosis bilateral neck, L>R [de-identified] : Left axillary adenopathy 2cm. mobile and nontender.

## 2020-11-30 NOTE — REVIEW OF SYSTEMS
[Negative] : Integumentary [Fatigue: Grade 0] : Fatigue: Grade 0 [Xerostomia: Grade 1 - Symptomatic (e.g., dry or thick saliva) without significant dietary alteration; unstimulated saliva flow >0.2 ml/min] : Xerostomia: Grade 1 - Symptomatic (e.g., dry or thick saliva) without significant dietary alteration; unstimulated saliva flow >0.2 ml/min [FreeTextEntry4] : see HPI [FreeTextEntry7] : see HPI

## 2021-02-22 ENCOUNTER — APPOINTMENT (OUTPATIENT)
Dept: OTOLARYNGOLOGY | Facility: CLINIC | Age: 81
End: 2021-02-22
Payer: MEDICARE

## 2021-02-22 DIAGNOSIS — R49.0 DYSPHONIA: ICD-10-CM

## 2021-02-22 DIAGNOSIS — R13.10 DYSPHAGIA, UNSPECIFIED: ICD-10-CM

## 2021-02-22 PROCEDURE — 99212 OFFICE O/P EST SF 10 MIN: CPT | Mod: 25

## 2021-02-22 PROCEDURE — 31575 DIAGNOSTIC LARYNGOSCOPY: CPT

## 2021-02-23 PROBLEM — R49.0 DYSPHONIA: Status: ACTIVE | Noted: 2019-07-16

## 2021-02-23 PROBLEM — R13.10 DYSPHAGIA: Status: ACTIVE | Noted: 2019-07-16

## 2021-05-25 ENCOUNTER — APPOINTMENT (OUTPATIENT)
Dept: RADIATION ONCOLOGY | Facility: CLINIC | Age: 81
End: 2021-05-25
Payer: MEDICARE

## 2021-05-25 PROCEDURE — 99213 OFFICE O/P EST LOW 20 MIN: CPT

## 2021-05-25 NOTE — HISTORY OF PRESENT ILLNESS
[FreeTextEntry1] : Mr. Nathaniel Fuentes completed radiation therapy to the oropharynx/ neck for [id number] base of tongue SCC, 7000 cGy over 35 fractions from 12/19/18 to 2/8/19.  He received 5 out of 7 cycles of concurrent weekly Cisplatin with Dr. Blandon.  He had a complicated post-CRT course detailed in prior notes and below.  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  He completed radiation therapy 2700 cGy / 3000 cGy to the stomach from 7/9/19 - 8/21/19. Treatment was interrupted briefly (7/11-7/12/19) while he was admitted to [facility] for bleeding from the mouth. He also elected to take a treatment from 7/29 - 8/7/19 due to personal/work obligations. He also chose to terminate treatment early. \par \par 5/25/21- Follow-up\par Mr. Fuentes returns for routine follow-up.  He was last seen on 11/30/21 in our office. He followed up with Dr. Miles on 2/22/21. Patient had a PET CT done on 2/22/21. He is scheduled to start Rituximab with Dr BOBBY on June 1st for progression of marginal zone lymphoma. He sees dentist every 6 months.\par \par PET CT 2/22/21 [facility]\par Impression: Spiculated lesion in the right middle lobe demonstrates interval increase in size and hypermetabolic activity.\par Axillary lymphadenopathy demonstrates change in overall pattern with probable slight overall increase although mild. Essentially stable retroperitoneal and pelvic lymphadenopathy. The spleen is now normal in size. Complex left renal cyst is stable. \par \par \par 11/30/20 - Follow-up\par Mr. Fuentes returns for routine follow-up.  He was last seen on 7/14/20.  Plan was for f/u with Dr. Blandon, f/u with GI Dr. Peña, f/u with Dr. Miles in February 2021, f/u with PCP regarding TSH, f/u with SLP, head and neck exercises, RTC in 4 months.  He last saw Dr. Blandon 3 weeks ago.  He last saw Dr. Peña "a long time ago".  He last saw his PCP 3 months ago.  He states his TSH was rechecked and it was normal.  He has not seen SLP.  Today, he reports feeling well, "a solid 9/10".  He has good energy, is exercising and golfing.  He is eating a regular diet, full meals.  Taste is normal.  Mild dysphagia with dry foods.  Speech is still slurred, which he attributes to "saliva control", not bothersome to him.  He denies pain, odynophagia, nausea, vomiting, constipation, diarrhea, melena.  \par \par PET/CT 10/19/20 ([facility])\par - There is significant axillary, retroperitoneal, as well as subcutaneous lymphadenopathy.  This may be compatible with lymphoma.  This raises the possibility of two different kinds of lymphoma.  Correlation with clinical and laboratory values recommended.  \par - There is splenomegaly.  Status post cholecystectomy.  Correlate if patient has had any bowel or appendix surgery.  \par - Right sided surgical clips noted. \par - Enlarged prostate. \par - Complex left renal cyst.  This could be durther evaluation with dedicated MRI of the abdomen pre and post contrast if stll cinically needed. \par - Lung interstitial markings, probably inflammatory versus infectious. \par - Spiculated left upper lobe nodule.  Correlate with any prior imaging is recommended.  If not available, follow-up CT of the chest in approximately 3 months is recommended. \par \par 7/14/20 - Follow-up\claude Armstrong returns for routine follow-up.  He was last seen on 1/16/20.  Plan was for PET/CT and f/u with Dr. Blandon, f/u with Dr. Miles, f/u with Dr. Vazquez at [place] for EGD in February/March, f/u with PCP regarding TSH, f/u with dental, head and neck exercises.  He last saw Dr. Blandon 1 month ago.  He last saw Dr. Miles on 6/17/20, plan is for f/u in February 2021.  He had an EGD with Dr. Peña at [place] recently, which was reportedly normal.  We will request the reports.  He last saw his PCP in November, did not follow-up on TSH.  He has not seen dental, as they have been closed due to COVID.  \par \par Today, he reports feeling well overall.  He is eating a regular diet and continues to gain weight.  He notes some dysphagia with dry foods and sensitivity to extreme temperatures.  Denies pain and odynophagia.  Mild xerostomia relieved with drinking water.  Taste is 100%.  He notes teeth staining. \par \par 1/16/20 - Follow-up\par Mr. Fuentes returns for routine follow-up.  He was last seen by Dr. Duong on 9/25/19.  Plan was for f/u with Dr. Blandon and Dr. Miles and f/u with PMD regarding synthroid.  He last saw Dr. Blandon 2 weeks ago.  He states he is scheduled for a PET/CT next week and follow-up with Dr. Blandon.  He last saw Dr. Miles on 11/11/19.  He is scheduled to follow-up in March.  He has not followed up with his PCP regarding synthroid.  He states he took the medication until his supply ran out.  He has not had TSH checked.  He has not followed up with GI.  He has not seen the dentist since prior to radiation.  He saw SLP on 9/17/19 and was recommended MBS, which has not been done.  \par \par CT neck with contrast 1/8/20 - IMPRESSION: There is no evidence of local or regional ozzy recurrence of oral cavity/oropharyngeal carcinoma. 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 Right upper lobe nodules are unchanged since prior study of 7/2019. If further characterization is desired, recommend correlation with dedicated chest CT or with PET/CT. \par \par Today, he reports feeling well.  He states he no longer feels the effects of radiation.  He denies dysphagia and odynophagia.  He is eating a regular diet and has gained 20 pounds.  He notes his left tongue still feels "a bit raw" but is improving.  He denies abdominal pain, nausea, vomiting, constipation, diarrhea, melena and hematochezia.  \par \par 9/25/19 - PTE\par Mr. Fuentes returns today for post-treatment evaluation. He states he was admitted to [facility] on 8/23 with a GI bleed and hemoglobin of 4.3.  He and his wife state that he underwent EGD and was found to have a large bleeding gastric ulcer.  They state that pathology was negative for infection and malignancy.  He was started on Carafate and Protonix and is scheduled to follow-up with GI Dr. Doty at [facility] on 10/3.  He was also found to be hypothyroid during that admission and started on Synthroid.  \par \par He completed his 15 day course of keflex.  He last saw Dr. Miles on 9/9/19.  According to Dr. Miles's note, the erosive area in the left BOT is almost completely healed.  He is scheduled to f/u with Dr. Miles in November.  He saw [facility] on 9/17.  He last saw Dr. Blandon yesterday.  Today, he reports feeling well.  He states he hasn't "felt this good since January."  He denies oral/neck pain, dysphagia, odynophagia, abdominal pain, nausea, vomiting.  \par ________________________________________\par 7/8/19 - Follow-up - Mr. Fuentes was scheduled to begin RT to the stomach today.  He did not take his prophylactic zofran this morning, so declined treatment today.  He has opted to begin treatment tomorrow.  He and his wife present to clinic today for follow-up of his left base of tongue ulcer.  They state they have been seeing Dr. Clifton recently, and report that she has been happy with the progress of the ulcer.  He continues to have neck pain, which has improved significantly with oxycodone.  They plan to get a second opinion from Dr. Miles regarding his ongoing neck pain.  They also have some questions about his medications.  He points to his pain as bilateral and essentially overlying the hyoid bone.   he is able to swallow.   taste is poot\par \par 6/13/19 - SNA\par Mr. Fuentes returns for SNA.  He was last seen here on 6/3/19 at which time he was found to have a deep BOT ulcer with heaped edges on clinical exam.  PET/CT and CT were also concerning for uptake in BOT.  He was given a course of diflucan, magic mouthwash, and advised to use baking soda and meat tenderizer oral rinses and restart gabapentin and protonix.  He was also to be evaluated by Dr. Clifton for possible biopsy - she has decided to re-evaluate before deciding on biopsy.  \par \par Upper Endoscopy was done at [facility] (Dr. Santamaria) for Melena on 5/22/19; report shows "diffuse severe mucoosal changes characterized by granularity, inflammation, and nodularity were found in the cardia, in the gastric fundus, and gastric body. 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 Pylori and focal granulation tissue and fibrinopurulent debris. [facility] Path review pending.  He saw Dr. Blandon on 6/11/19 and discussed chemo vs. RT.  H/H was 12.0/38.3 on 6/11 (after Procrit).  \par \par He is here today for consideration of radiation therapy.  He states that his right facial pain is unchanged.  He will complete the course of diflucan in 3 days and is using mouth rinses with the exception of magic mouthwash.  He was encouraged to start using it.  He notes that he is "here for another reason" today.  He denies GI complaints including abdominal pain, nausea, vomiting, constipation, diarrhea, melena, hematemesis.  He has not yet followed up with GI for colonoscopy.  \par \par 6/3/19 - Follow-up\par Mr. Fuentes presents today for routine follow-up.  He was last seen here on 4/2/19.  Plan at that time was for PET/CT and CT neck with contrast in 6 weeks, continue weekly visits with Dr. Blandon, continue Ensure 3 cans/day and increase calories, continue gabapentin (300mg AM, 600mg PM), stop trazodone, continue VNS and SLP and exercises.  \par \par PET/CT 5/16/19 - Impression: \par 1. Increased FDG uptake within the root of the tongue.  While some portion of this activity may be related to treatment and subsequent ulceration, the possibility of persistent disease is raised because of the intensity and distribution of the uptake.\par 2.  Scattered subcentimeter mediastinal and para-aortic lymph nodes. \par 3.  FDG uptake diffusely throughout the stomach and may represent infectious versus inflammatory gastritis.  Please note that a similar pattern was seen on the patient's prior PET scan dated 11/12/2018.\par \par CT neck with contrast 5/16/19 - Impression: There is now a large ulcer involving the left floor of mouth that is lined by heterogenously enhancing soft tissue that is FDG avid.  Findings are suspicious for disease persistence along the ulcer walls.  Correlation with visual inspection and possibly tissue sampling is recommended for further characterization.  Presumed lymphoproliferative disease in the orbits and cervical lymph nodes has nearly resolved.  \par \par He last saw Dr. Blandon one week ago.  Today, he reports that he has been "up and down" since we last saw him.  He states that approximately two weeks ago, he was experiencing bloody stool.  His wife called an ambulance and he was taken to the ED at [facility].  He states that he was found to be anemic with hemoglobin of 3 and received 3 blood transfusions.  He  underwent GI workup including upper endoscopy which showed bleeding ulcerations in the stomach; all pathology was benign.  He notes that he had been taking Aleve for pain prior to this episode and has since stopped.  He was discharged home 3 days later.  He was advised to take protonix daily and follow-up with GI.  He states that he has stopped protonix because he has not had any episodes of bleeding.  He did not keep his follow-up appointment with GI as he was not feeling well that day.  His wife states she plans to reschedule.  \par \par He notes continued left neck pain and edema relieved with warm compresses and tylenol.  He states that is has worsened over the past 2-3 weeks.  Of note, he also reports stopping gabapentin around that time because he didn't think it was helping and it is "just another thing."  He reports continued odynophagia which prevents him from eating solid food.  He is still on a liquid diet with ensure.  He states he lost 12 pounds during his recent hospitalization but has started gaining some weight again.  He reports that xerostomia has resolved.  He plans to follow-up with his PMD Dr. Mckeon this week and will also see Dr. Clifton today.  \par \par 4/2/19- FOLLOW UP\par Mr. Fuentes presents for follow up. When he was last seen on 3/19/19, he was volume depleted and continuing to report throat pain. Plan at the time was for labs with Dr. BOBBY, complete course of Diflucan, increase Ensure plus to 3 cans/ day, continue MMW and Gabapentin, stop Trazodone, continue VNS services, and follow up with SLP. \par \par He saw SLP on 3/28/19, at which time he recommended H&N exercises to improve swallow strength. Complains of irritation in throat but no pain. Stopped trazadone and finished course of diflucan. Reports more energy to walk and exercise. Able to tolerate 3 cans of ensure a day. No longer using MMW, says its effects don’t last long. Able to tolerate liquids and soft foods. Taste beginning to come back. Constipation persists. Seeing Dr. BOBBY today for procrit. Sees him weekly per wife. He decided to cancel his trip to [place] this week. \par \par 3/19/19 - FOLLOW UP\par Today Using MMW, Biotene, he reports throat pain still persist, not using gabapentin regularly, encouraged to use as ordered to get relief. His wife says she is now forcing him to use gabapentin and to use MMW regularly. She also made sure he started the fluconazole RX. Tolerating only liquids, soft foods like yoghurt, potatoes mashed, cream of wheat. Assessed by visiting nurse, will start PT and Sp/sw therapy next week. Carries out physical activity as tolerated, walks 4 blocks daily. Constipation is relieved by miralax.\par \par 3/5/19- Pt is here for a followup 3/5/19. Today he states he feels better, tolerates puree foods(warm cereal and soups), 2-3 Ensure shakes and whey protein recommended by Nutritionist. Increased PO fluids now to 2 Liters per day. Reports Painful throat while swallowing. Relieved by MMW and biotene. Denies any other pain. Visit with Dr Blandon today at 1pm, will review bloodwork after. ER Visit from Dr Blandon office on 2/26/19 for very low hemoglobin, received blood products, Iron and IVF. Says he is taking diflucan which will complete today (although we sent in 15 pills 1 week ago - wife will check # of pills at home). \par \par 2/26/19- PTE\par Mr. Fuentes returns for an emergent PTE. Several phone calls took place over the past few weeks after he completed RT regarding poor PO intake. He was Rx'd MMW. He continued to lose weight since completion of treatment. On the night of 2/18/18, he fell in the middle of the night (did not hit his head as per patient's wife) due to dizziness. He declined coming in last week for assessment/ labs; he and his wife reported improved PO intake. His wife Selma emailed over the weekend reporting another fall due to slipping on liquid on the floor. She stated he had increased his fluid intake and was doing quite well until the fall. They also called his PCP Dr. Mckeon to have the Trazodone prescription refilled as he had been having Restless Leg Syndrome most nights. Patient and his wife thought this was the cause of the second fall as he was quite stable in the day but became off balance in the night. He last saw Dr. Blandon on 2/15/19 and has a follow up scheduled for this Friday. \par \par Today, he reports he is eating and drinking better. However, he feels generally weak and also has lightheadedness with position changes. He admits that he did hit his head with both falls, but denies headaches, LOC, confusion. He has lost four pounds since last seen on 2/8, but it is an improvement since last week (he has gained 4 pounds since last week). He is drinking 2 Ensure plus per day. Also eating soft/ pureed foods, soups, broth. He reports sore throat that is improving; was 9/ 10 last week, but now 5/ 10. Using MMW, which is helpful. Also reports xerostomia, using Biotene but only once a day. Patient and wife inquired about Trazodone; he is taking 100 mg daily for restless leg syndrome as per his PCP, but it makes him feel "disoriented." \par \par ______________________________________________\par ONCOLOGIC HISTORY \par Mr. 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 Notably, he has PMH non-Hodgkin's lymphoma (patient unsure of which subtype), diagnosed in 1999, and treated by Dr. Shepherd with CHOP and Fludarabine. \par \par He was followed by Dr. Clifton ([facility]) and had complaints of swelling and pain over the left submandibular gland in 2017. A CT scan of the neck done 12/12/17 showed no evidence of pharyngeal mass, asymmetry or laryngeal mass. There were numerous cervical lymph nodes which were larger on the left, as well as enlarged subclavian and axillary lymph nodes. \par \par He continued to have complaints of left neck pain and had occasional metallic taste in mouth in July 2018. He also developed a lisp/ difficulty speaking in July 2018.\par \par He underwent CT neck on 10/15/18, which showed the following:\par IMPRESSION: \par 1. Soft tissue mass involving the left tongue base compatible with a tongue base neoplasm\par 2. Superficial nodule involving the right cheek which may represent an enlarged periparotid lymph node measuring 2.0 x 1.1 cm in axial cross-section, minimally increased in size since the prior study\par 3. Scattered mildly enlarged lymph nodes in the neck again noted, relatively stable since the prior study\par 4. Abnormal soft tissue within the orbits, not significantly changed\par 5. Stable nodular densities within the right upper lung\par 6. Cervical spondylosis with canal and foraminal stenosis at C4-C5. \par \par He underwent a biopsy of the mass on 10/26/18 by Dr. Clifton which showed invasive SCC nonkeratinizing, poorly differentiated, p16 positive, HPV negative. Flow cytometry also done, but results were inconclusive.\par \par PET/CT ([facility])11/12/18\par IMPRESSION: \par 1. Robust lobular hypermetabolic soft tissue mass involving the tongue base consistent with known primary tongue base malignancy. It extends to the level of the hyoid without obvious involvement of the preepiglottic fat. If warranted, consider dedicated MR imaging for further assessment.\par 2. Assessment for metastatic regional lymph nodes from primary tongue malignancy is limited in this patient with evidence of low-grade lymphomatous involvement in the neck. Asymmetrically hypermetabolic but stable appearing lymph nodes in the left level III lymph node would be the most concerning for potential regional ozzy disease from tongue base malignancy.\par 3. Multiple homogeneous lymph nodes associated with low-grade uptake especially in the neck and chest and to a lesser extent the abdomen and pelvis is likely related to patient's history of non-Hodgkin's lymphoma. Associated metabolic activity is consistent with a low-grade viability.\par 4. No definite evidence to suggest focal splenic or marrow involvement of disease. Low-grade diffuse involvement is not excluded.\par 5. Stable appearance of peribronchial nodular foci in the right upper lobe, too small to characterize, but favoring a post inflammatory/infectious process. Other benign type changes are seen in the mid to lower lung fields, which can be reassessed at follow-up imaging.\par 6. No suspicious findings to suggest marginal or regional ozzy recurrence of prostate malignancy or evidence of osteoblastic metastatic disease.\par \par Today, he reports he feels generally well with exception of difficulty swallowing solid foods since the biopsy. He has been eating soups and soft foods. He also reports xerostomia and continues to have a lisp. He is mildly fatigue, but it improves with rest. He continues to work as a . He denies fever, chills, CP, SOB, N/V/D, decreased appetite. He has lost approx 6- 7 pounds since the biopsy on 10/26. Of note, he reports he is claustrophobic, has needed anxiolytics prior to imaging in the past. \par \par His dentist is Dr. Hager ([phone]) and he last saw Dr. Hager in September. \par Family history notable for brother with leukemia. \par \par

## 2021-05-25 NOTE — PHYSICAL EXAM
[Examination Of The Oral Cavity] : the lips and gums were normal [Normal Oral Cavity] : oral cavity was normal [Normal] : no respiratory distress, lungs were clear to auscultation bilaterally [Heart Rate And Rhythm] : heart rate and rhythm were normal [Heart Sounds] : normal S1 and S2 [Abdomen Soft] : soft [Nondistended] : nondistended [Abdomen Tenderness] : non-tender [Cervical Lymph Nodes Enlarged Posterior Bilaterally] : posterior cervical [Cervical Lymph Nodes Enlarged Anterior Bilaterally] : anterior cervical [Supraclavicular Lymph Nodes Enlarged Bilaterally] : supraclavicular [Skin Color & Pigmentation] : normal skin color and pigmentation [Oriented To Time, Place, And Person] : oriented to person, place, and time [de-identified] : tongue with posterior/midline volume loss. soft on palpation. left tongue atrophy. left posterior hemitongue is improved. good movement to right.  [de-identified] : grade 1 fibrosis bilateral neck, L>R [de-identified] : less neck ROM compared to prior

## 2021-10-06 ENCOUNTER — RESULT REVIEW (OUTPATIENT)
Age: 81
End: 2021-10-06

## 2021-10-06 ENCOUNTER — OUTPATIENT (OUTPATIENT)
Dept: OUTPATIENT SERVICES | Facility: HOSPITAL | Age: 81
LOS: 1 days | End: 2021-10-06

## 2021-10-06 ENCOUNTER — APPOINTMENT (OUTPATIENT)
Dept: CT IMAGING | Facility: CLINIC | Age: 81
End: 2021-10-06
Payer: MEDICARE

## 2021-10-06 DIAGNOSIS — Z41.9 ENCOUNTER FOR PROCEDURE FOR PURPOSES OTHER THAN REMEDYING HEALTH STATE, UNSPECIFIED: Chronic | ICD-10-CM

## 2021-10-06 DIAGNOSIS — Z98.890 OTHER SPECIFIED POSTPROCEDURAL STATES: Chronic | ICD-10-CM

## 2021-10-06 DIAGNOSIS — Z90.49 ACQUIRED ABSENCE OF OTHER SPECIFIED PARTS OF DIGESTIVE TRACT: Chronic | ICD-10-CM

## 2021-10-06 PROCEDURE — 70491 CT SOFT TISSUE NECK W/DYE: CPT | Mod: 26,MH

## 2021-10-11 ENCOUNTER — APPOINTMENT (OUTPATIENT)
Dept: OTOLARYNGOLOGY | Facility: CLINIC | Age: 81
End: 2021-10-11
Payer: MEDICARE

## 2021-10-11 DIAGNOSIS — C01 MALIGNANT NEOPLASM OF BASE OF TONGUE: ICD-10-CM

## 2021-10-11 PROCEDURE — 99214 OFFICE O/P EST MOD 30 MIN: CPT | Mod: 25

## 2021-10-11 PROCEDURE — 31575 DIAGNOSTIC LARYNGOSCOPY: CPT

## 2022-10-17 ENCOUNTER — APPOINTMENT (OUTPATIENT)
Dept: OTOLARYNGOLOGY | Facility: CLINIC | Age: 82
End: 2022-10-17

## 2022-10-17 VITALS
BODY MASS INDEX: 23.91 KG/M2 | SYSTOLIC BLOOD PRESSURE: 124 MMHG | WEIGHT: 167 LBS | HEIGHT: 70 IN | DIASTOLIC BLOOD PRESSURE: 74 MMHG | HEART RATE: 73 BPM

## 2022-10-17 DIAGNOSIS — C77.0 SECONDARY AND UNSPECIFIED MALIGNANT NEOPLASM OF LYMPH NODES OF HEAD, FACE AND NECK: ICD-10-CM

## 2022-10-17 DIAGNOSIS — K11.7 DISTURBANCES OF SALIVARY SECRETION: ICD-10-CM

## 2022-10-17 DIAGNOSIS — C10.9 MALIGNANT NEOPLASM OF OROPHARYNX, UNSPECIFIED: ICD-10-CM

## 2022-10-17 PROCEDURE — 99213 OFFICE O/P EST LOW 20 MIN: CPT | Mod: 25

## 2022-10-17 PROCEDURE — 31575 DIAGNOSTIC LARYNGOSCOPY: CPT

## 2023-01-03 ENCOUNTER — OUTPATIENT (OUTPATIENT)
Dept: OUTPATIENT SERVICES | Facility: HOSPITAL | Age: 83
LOS: 1 days | End: 2023-01-03
Payer: MEDICARE

## 2023-01-03 ENCOUNTER — APPOINTMENT (OUTPATIENT)
Dept: NUCLEAR MEDICINE | Facility: HOSPITAL | Age: 83
End: 2023-01-03
Payer: MEDICARE

## 2023-01-03 DIAGNOSIS — Z41.9 ENCOUNTER FOR PROCEDURE FOR PURPOSES OTHER THAN REMEDYING HEALTH STATE, UNSPECIFIED: Chronic | ICD-10-CM

## 2023-01-03 DIAGNOSIS — Z98.890 OTHER SPECIFIED POSTPROCEDURAL STATES: Chronic | ICD-10-CM

## 2023-01-03 DIAGNOSIS — Z90.49 ACQUIRED ABSENCE OF OTHER SPECIFIED PARTS OF DIGESTIVE TRACT: Chronic | ICD-10-CM

## 2023-01-03 LAB — GLUCOSE BLDC GLUCOMTR-MCNC: 100 MG/DL — HIGH (ref 70–99)

## 2023-01-03 PROCEDURE — 78815 PET IMAGE W/CT SKULL-THIGH: CPT | Mod: 26,PS,MH

## 2023-01-03 PROCEDURE — A9552: CPT

## 2023-01-03 PROCEDURE — 78815 PET IMAGE W/CT SKULL-THIGH: CPT | Mod: MH

## 2023-01-03 PROCEDURE — 82962 GLUCOSE BLOOD TEST: CPT

## 2023-01-18 NOTE — PATIENT PROFILE ADULT - FALL HARM RISK
other No Residual Tumor Seen Histology Text: There were no malignant cells seen in the sections examined.

## 2023-07-06 ENCOUNTER — APPOINTMENT (OUTPATIENT)
Dept: NUCLEAR MEDICINE | Facility: HOSPITAL | Age: 83
End: 2023-07-06
Payer: MEDICARE

## 2023-07-06 ENCOUNTER — OUTPATIENT (OUTPATIENT)
Dept: OUTPATIENT SERVICES | Facility: HOSPITAL | Age: 83
LOS: 1 days | End: 2023-07-06
Payer: MEDICARE

## 2023-07-06 DIAGNOSIS — Z41.9 ENCOUNTER FOR PROCEDURE FOR PURPOSES OTHER THAN REMEDYING HEALTH STATE, UNSPECIFIED: Chronic | ICD-10-CM

## 2023-07-06 DIAGNOSIS — Z90.49 ACQUIRED ABSENCE OF OTHER SPECIFIED PARTS OF DIGESTIVE TRACT: Chronic | ICD-10-CM

## 2023-07-06 DIAGNOSIS — Z98.890 OTHER SPECIFIED POSTPROCEDURAL STATES: Chronic | ICD-10-CM

## 2023-07-06 LAB — GLUCOSE BLDC GLUCOMTR-MCNC: 105 MG/DL — HIGH (ref 70–99)

## 2023-07-06 PROCEDURE — 82962 GLUCOSE BLOOD TEST: CPT

## 2023-07-06 PROCEDURE — 78815 PET IMAGE W/CT SKULL-THIGH: CPT | Mod: 26,PS,MB

## 2023-07-06 PROCEDURE — 78815 PET IMAGE W/CT SKULL-THIGH: CPT | Mod: MB

## 2023-07-06 PROCEDURE — A9552: CPT

## 2023-10-23 ENCOUNTER — APPOINTMENT (OUTPATIENT)
Dept: OTOLARYNGOLOGY | Facility: CLINIC | Age: 83
End: 2023-10-23
Payer: MEDICARE

## 2023-10-23 PROCEDURE — 99214 OFFICE O/P EST MOD 30 MIN: CPT | Mod: 25

## 2023-10-23 PROCEDURE — 31575 DIAGNOSTIC LARYNGOSCOPY: CPT

## 2024-04-23 ENCOUNTER — OUTPATIENT (OUTPATIENT)
Dept: OUTPATIENT SERVICES | Facility: HOSPITAL | Age: 84
LOS: 1 days | End: 2024-04-23
Payer: MEDICARE

## 2024-04-23 ENCOUNTER — APPOINTMENT (OUTPATIENT)
Dept: NUCLEAR MEDICINE | Facility: HOSPITAL | Age: 84
End: 2024-04-23

## 2024-04-23 ENCOUNTER — APPOINTMENT (OUTPATIENT)
Dept: ULTRASOUND IMAGING | Facility: HOSPITAL | Age: 84
End: 2024-04-23

## 2024-04-23 DIAGNOSIS — Z90.49 ACQUIRED ABSENCE OF OTHER SPECIFIED PARTS OF DIGESTIVE TRACT: Chronic | ICD-10-CM

## 2024-04-23 DIAGNOSIS — Z98.890 OTHER SPECIFIED POSTPROCEDURAL STATES: Chronic | ICD-10-CM

## 2024-04-23 DIAGNOSIS — Z41.9 ENCOUNTER FOR PROCEDURE FOR PURPOSES OTHER THAN REMEDYING HEALTH STATE, UNSPECIFIED: Chronic | ICD-10-CM

## 2024-04-23 LAB — GLUCOSE BLDC GLUCOMTR-MCNC: 101 MG/DL — HIGH (ref 70–99)

## 2024-04-23 PROCEDURE — 82962 GLUCOSE BLOOD TEST: CPT

## 2024-04-23 PROCEDURE — 76700 US EXAM ABDOM COMPLETE: CPT | Mod: 26

## 2024-04-23 PROCEDURE — 78815 PET IMAGE W/CT SKULL-THIGH: CPT | Mod: 26,PS,MH

## 2024-04-23 PROCEDURE — 78815 PET IMAGE W/CT SKULL-THIGH: CPT

## 2024-04-23 PROCEDURE — A9552: CPT

## 2024-04-23 PROCEDURE — 76700 US EXAM ABDOM COMPLETE: CPT

## 2024-05-06 DIAGNOSIS — C85.90 NON-HODGKIN LYMPHOMA, UNSPECIFIED, UNSPECIFIED SITE: ICD-10-CM

## 2024-05-06 DIAGNOSIS — C85.10 UNSPECIFIED B-CELL LYMPHOMA, UNSPECIFIED SITE: ICD-10-CM

## 2024-05-06 DIAGNOSIS — C82.90 FOLLICULAR LYMPHOMA, UNSPECIFIED, UNSPECIFIED SITE: ICD-10-CM

## 2024-05-06 DIAGNOSIS — Z90.49 ACQUIRED ABSENCE OF OTHER SPECIFIED PARTS OF DIGESTIVE TRACT: ICD-10-CM

## 2024-05-06 DIAGNOSIS — C02.9 MALIGNANT NEOPLASM OF TONGUE, UNSPECIFIED: ICD-10-CM

## 2024-05-06 DIAGNOSIS — R16.1 SPLENOMEGALY, NOT ELSEWHERE CLASSIFIED: ICD-10-CM

## 2024-05-12 NOTE — H&P ADULT - PROBLEM SELECTOR PLAN 1
-likely 2/2 chemotherapy. Iron studies consistent with JOE vs ACD  -goal Hb 7.0.   -Hgb 11.8 and MCV 79.9 in December 2018.  -s/p 1u pRBC  -no signs of bleeding Car -likely 2/2 chemotherapy. Iron studies consistent with JOE vs ACD  -goal Hb 7.0.   -Hgb 11.8 and MCV 79.9 in December 2018.  -s/p 1u pRBC  -no signs of bleeding  -will transfuse 2nd unit of pRBC -likely 2/2 chemotherapy. Iron studies consistent with ACD vs JOE  -goal Hb 7.0.   -Hgb 11.8 and MCV 79.9 in December 2018.  -s/p 1u pRBC  -no signs of bleeding  -will transfuse 2nd unit of pRBC

## 2024-10-21 ENCOUNTER — NON-APPOINTMENT (OUTPATIENT)
Age: 84
End: 2024-10-21

## 2024-10-21 ENCOUNTER — APPOINTMENT (OUTPATIENT)
Dept: OTOLARYNGOLOGY | Facility: CLINIC | Age: 84
End: 2024-10-21
Payer: MEDICARE

## 2024-10-21 VITALS
DIASTOLIC BLOOD PRESSURE: 73 MMHG | WEIGHT: 147 LBS | HEIGHT: 70 IN | TEMPERATURE: 96.6 F | HEART RATE: 96 BPM | SYSTOLIC BLOOD PRESSURE: 110 MMHG | BODY MASS INDEX: 21.05 KG/M2

## 2024-10-21 PROCEDURE — 99214 OFFICE O/P EST MOD 30 MIN: CPT | Mod: 25

## 2024-10-21 PROCEDURE — 31575 DIAGNOSTIC LARYNGOSCOPY: CPT
